# Patient Record
Sex: FEMALE | Race: WHITE | Employment: OTHER | URBAN - METROPOLITAN AREA
[De-identification: names, ages, dates, MRNs, and addresses within clinical notes are randomized per-mention and may not be internally consistent; named-entity substitution may affect disease eponyms.]

---

## 2017-09-12 ENCOUNTER — AMBULATORY - RIVER FALLS (OUTPATIENT)
Dept: FAMILY MEDICINE | Facility: CLINIC | Age: 62
End: 2017-09-12

## 2017-09-13 LAB
CHOLEST SERPL-MCNC: 156 MG/DL
CHOLEST/HDLC SERPL: 2.5 {RATIO}
CREAT SERPL-MCNC: 0.8 MG/DL (ref 0.5–0.99)
GLUCOSE BLD-MCNC: 112 MG/DL (ref 65–99)
HBA1C MFR BLD: 6.1 %
HDLC SERPL-MCNC: 62 MG/DL
LDLC SERPL CALC-MCNC: 79 MG/DL
NONHDLC SERPL-MCNC: 94 MG/DL
TRIGL SERPL-MCNC: 71 MG/DL

## 2017-09-18 ENCOUNTER — OFFICE VISIT - RIVER FALLS (OUTPATIENT)
Dept: FAMILY MEDICINE | Facility: CLINIC | Age: 62
End: 2017-09-18

## 2017-09-18 ASSESSMENT — MIFFLIN-ST. JEOR: SCORE: 1442.61

## 2018-04-24 ENCOUNTER — OFFICE VISIT - RIVER FALLS (OUTPATIENT)
Dept: FAMILY MEDICINE | Facility: CLINIC | Age: 63
End: 2018-04-24

## 2018-04-24 ASSESSMENT — MIFFLIN-ST. JEOR: SCORE: 1424.47

## 2018-04-25 LAB
CREAT SERPL-MCNC: 0.93 MG/DL (ref 0.5–0.99)
GLUCOSE BLD-MCNC: 100 MG/DL (ref 65–99)

## 2018-09-28 ENCOUNTER — OFFICE VISIT - RIVER FALLS (OUTPATIENT)
Dept: FAMILY MEDICINE | Facility: CLINIC | Age: 63
End: 2018-09-28

## 2018-09-28 ASSESSMENT — MIFFLIN-ST. JEOR: SCORE: 1406.32

## 2018-09-29 LAB
CHOLEST SERPL-MCNC: 161 MG/DL
CHOLEST/HDLC SERPL: 2.3 {RATIO}
CREAT SERPL-MCNC: 0.85 MG/DL (ref 0.5–0.99)
GLUCOSE BLD-MCNC: 114 MG/DL (ref 65–99)
HDLC SERPL-MCNC: 69 MG/DL
LDLC SERPL CALC-MCNC: 79 MG/DL
NONHDLC SERPL-MCNC: 92 MG/DL
TRIGL SERPL-MCNC: 57 MG/DL

## 2019-05-28 ENCOUNTER — AMBULATORY - RIVER FALLS (OUTPATIENT)
Dept: FAMILY MEDICINE | Facility: CLINIC | Age: 64
End: 2019-05-28

## 2019-05-29 ENCOUNTER — COMMUNICATION - RIVER FALLS (OUTPATIENT)
Dept: FAMILY MEDICINE | Facility: CLINIC | Age: 64
End: 2019-05-29

## 2019-05-29 LAB
CREAT UR-MCNC: 94 MG/DL (ref 20–275)
GLUCOSE BLD-MCNC: 96 MG/DL (ref 65–139)
HBA1C MFR BLD: 6.2 %
MICROALBUMIN UR-MCNC: 0.8 MG/DL
MICROALBUMIN/CREAT UR: 9 MG/G{CREAT}

## 2019-06-10 ENCOUNTER — OFFICE VISIT - RIVER FALLS (OUTPATIENT)
Dept: FAMILY MEDICINE | Facility: CLINIC | Age: 64
End: 2019-06-10

## 2019-06-10 ASSESSMENT — MIFFLIN-ST. JEOR: SCORE: 1397.25

## 2020-06-15 ENCOUNTER — COMMUNICATION - RIVER FALLS (OUTPATIENT)
Dept: FAMILY MEDICINE | Facility: CLINIC | Age: 65
End: 2020-06-15

## 2020-07-13 ENCOUNTER — OFFICE VISIT - RIVER FALLS (OUTPATIENT)
Dept: FAMILY MEDICINE | Facility: CLINIC | Age: 65
End: 2020-07-13

## 2020-07-14 ENCOUNTER — COMMUNICATION - RIVER FALLS (OUTPATIENT)
Dept: FAMILY MEDICINE | Facility: CLINIC | Age: 65
End: 2020-07-14

## 2020-07-14 LAB
BUN SERPL-MCNC: 27 MG/DL (ref 7–25)
BUN/CREAT RATIO - HISTORICAL: 32 (ref 6–22)
CALCIUM SERPL-MCNC: 9.6 MG/DL (ref 8.6–10.4)
CHLORIDE BLD-SCNC: 105 MMOL/L (ref 98–110)
CHOLEST SERPL-MCNC: 147 MG/DL
CHOLEST/HDLC SERPL: 2.5 {RATIO}
CO2 SERPL-SCNC: 30 MMOL/L (ref 20–32)
CREAT SERPL-MCNC: 0.84 MG/DL (ref 0.5–0.99)
EGFRCR SERPLBLD CKD-EPI 2021: 72 ML/MIN/1.73M2
GLUCOSE BLD-MCNC: 103 MG/DL (ref 65–139)
HBA1C MFR BLD: 6 %
HDLC SERPL-MCNC: 59 MG/DL
LDLC SERPL CALC-MCNC: 70 MG/DL
NONHDLC SERPL-MCNC: 88 MG/DL
POTASSIUM BLD-SCNC: 3.6 MMOL/L (ref 3.5–5.3)
SODIUM SERPL-SCNC: 140 MMOL/L (ref 135–146)
TRIGL SERPL-MCNC: 94 MG/DL

## 2020-07-17 ENCOUNTER — AMBULATORY - RIVER FALLS (OUTPATIENT)
Dept: FAMILY MEDICINE | Facility: CLINIC | Age: 65
End: 2020-07-17

## 2020-09-01 ENCOUNTER — COMMUNICATION - RIVER FALLS (OUTPATIENT)
Dept: FAMILY MEDICINE | Facility: CLINIC | Age: 65
End: 2020-09-01

## 2020-10-02 ENCOUNTER — OFFICE VISIT - RIVER FALLS (OUTPATIENT)
Dept: FAMILY MEDICINE | Facility: CLINIC | Age: 65
End: 2020-10-02

## 2020-10-02 ASSESSMENT — MIFFLIN-ST. JEOR: SCORE: 1360.96

## 2021-04-29 ENCOUNTER — OFFICE VISIT - RIVER FALLS (OUTPATIENT)
Dept: FAMILY MEDICINE | Facility: CLINIC | Age: 66
End: 2021-04-29

## 2021-06-17 ENCOUNTER — OFFICE VISIT - RIVER FALLS (OUTPATIENT)
Dept: FAMILY MEDICINE | Facility: CLINIC | Age: 66
End: 2021-06-17

## 2021-06-18 ENCOUNTER — RECORDS - HEALTHEAST (OUTPATIENT)
Dept: SCHEDULING | Facility: CLINIC | Age: 66
End: 2021-06-18

## 2021-06-18 ENCOUNTER — RECORDS - HEALTHEAST (OUTPATIENT)
Dept: OTOLARYNGOLOGY | Facility: TELEHEALTH | Age: 66
End: 2021-06-18

## 2021-06-18 ENCOUNTER — RECORDS - HEALTHEAST (OUTPATIENT)
Dept: ADMINISTRATIVE | Facility: OTHER | Age: 66
End: 2021-06-18

## 2021-06-18 ENCOUNTER — COMMUNICATION - RIVER FALLS (OUTPATIENT)
Dept: FAMILY MEDICINE | Facility: CLINIC | Age: 66
End: 2021-06-18

## 2021-06-18 DIAGNOSIS — K11.8 PAIN OF SUBMANDIBULAR GLAND: ICD-10-CM

## 2021-07-13 ENCOUNTER — OFFICE VISIT - RIVER FALLS (OUTPATIENT)
Dept: FAMILY MEDICINE | Facility: CLINIC | Age: 66
End: 2021-07-13

## 2021-07-13 ENCOUNTER — COMMUNICATION - RIVER FALLS (OUTPATIENT)
Dept: FAMILY MEDICINE | Facility: CLINIC | Age: 66
End: 2021-07-13

## 2021-07-14 LAB
BUN SERPL-MCNC: 23 MG/DL (ref 7–25)
BUN/CREAT RATIO - HISTORICAL: ABNORMAL (ref 6–22)
CALCIUM SERPL-MCNC: 9.9 MG/DL (ref 8.6–10.4)
CHLORIDE BLD-SCNC: 101 MMOL/L (ref 98–110)
CHOLEST SERPL-MCNC: 146 MG/DL
CHOLEST/HDLC SERPL: 2.3 {RATIO}
CO2 SERPL-SCNC: 30 MMOL/L (ref 20–32)
CREAT SERPL-MCNC: 0.88 MG/DL (ref 0.5–0.99)
EGFRCR SERPLBLD CKD-EPI 2021: 68 ML/MIN/1.73M2
GLUCOSE BLD-MCNC: 101 MG/DL (ref 65–99)
HBA1C MFR BLD: 6.1 %
HDLC SERPL-MCNC: 63 MG/DL
LDLC SERPL CALC-MCNC: 69 MG/DL
NONHDLC SERPL-MCNC: 83 MG/DL
POTASSIUM BLD-SCNC: 4 MMOL/L (ref 3.5–5.3)
SODIUM SERPL-SCNC: 140 MMOL/L (ref 135–146)
TRIGL SERPL-MCNC: 60 MG/DL

## 2021-07-15 ENCOUNTER — COMMUNICATION - RIVER FALLS (OUTPATIENT)
Dept: FAMILY MEDICINE | Facility: CLINIC | Age: 66
End: 2021-07-15

## 2021-12-28 ENCOUNTER — OFFICE VISIT - RIVER FALLS (OUTPATIENT)
Dept: FAMILY MEDICINE | Facility: CLINIC | Age: 66
End: 2021-12-28

## 2021-12-28 ASSESSMENT — MIFFLIN-ST. JEOR: SCORE: 1338.28

## 2022-01-28 ENCOUNTER — COMMUNICATION - RIVER FALLS (OUTPATIENT)
Dept: FAMILY MEDICINE | Facility: CLINIC | Age: 67
End: 2022-01-28
Payer: MEDICARE

## 2022-02-07 ENCOUNTER — OFFICE VISIT - RIVER FALLS (OUTPATIENT)
Dept: FAMILY MEDICINE | Facility: CLINIC | Age: 67
End: 2022-02-07
Payer: MEDICARE

## 2022-02-07 ENCOUNTER — TRANSFERRED RECORDS (OUTPATIENT)
Dept: MULTI SPECIALTY CLINIC | Facility: CLINIC | Age: 67
End: 2022-02-07

## 2022-02-10 ENCOUNTER — TRANSFERRED RECORDS (OUTPATIENT)
Dept: HEALTH INFORMATION MANAGEMENT | Facility: CLINIC | Age: 67
End: 2022-02-10

## 2022-02-10 LAB
C TRACH DNA SPEC QL PROBE+SIG AMP: NOT DETECTED
N GONORRHOEA DNA SPEC QL PROBE+SIG AMP: NOT DETECTED
SPECIMEN DESCRIP: NORMAL
SPECIMEN DESCRIPTION: NORMAL

## 2022-02-11 ENCOUNTER — COMMUNICATION - RIVER FALLS (OUTPATIENT)
Dept: FAMILY MEDICINE | Facility: CLINIC | Age: 67
End: 2022-02-11
Payer: MEDICARE

## 2022-02-12 VITALS
DIASTOLIC BLOOD PRESSURE: 70 MMHG | TEMPERATURE: 98.1 F | TEMPERATURE: 98.1 F | HEART RATE: 71 BPM | SYSTOLIC BLOOD PRESSURE: 123 MMHG | BODY MASS INDEX: 32.24 KG/M2 | WEIGHT: 182 LBS | DIASTOLIC BLOOD PRESSURE: 80 MMHG | HEART RATE: 80 BPM | SYSTOLIC BLOOD PRESSURE: 128 MMHG | WEIGHT: 187.6 LBS | BODY MASS INDEX: 33.23 KG/M2

## 2022-02-12 VITALS
WEIGHT: 185 LBS | HEART RATE: 80 BPM | SYSTOLIC BLOOD PRESSURE: 132 MMHG | HEIGHT: 63 IN | TEMPERATURE: 97.3 F | DIASTOLIC BLOOD PRESSURE: 74 MMHG | BODY MASS INDEX: 32.78 KG/M2

## 2022-02-12 VITALS
WEIGHT: 204 LBS | BODY MASS INDEX: 36.14 KG/M2 | HEART RATE: 87 BPM | DIASTOLIC BLOOD PRESSURE: 86 MMHG | SYSTOLIC BLOOD PRESSURE: 135 MMHG | HEIGHT: 63 IN

## 2022-02-12 VITALS
DIASTOLIC BLOOD PRESSURE: 72 MMHG | SYSTOLIC BLOOD PRESSURE: 118 MMHG | TEMPERATURE: 98.1 F | WEIGHT: 194 LBS | HEART RATE: 77 BPM | OXYGEN SATURATION: 94 % | BODY MASS INDEX: 34.37 KG/M2

## 2022-02-12 VITALS
HEIGHT: 63 IN | BODY MASS INDEX: 35.44 KG/M2 | DIASTOLIC BLOOD PRESSURE: 88 MMHG | WEIGHT: 200 LBS | SYSTOLIC BLOOD PRESSURE: 129 MMHG | HEART RATE: 76 BPM

## 2022-02-12 VITALS
BODY MASS INDEX: 35.08 KG/M2 | HEART RATE: 62 BPM | HEIGHT: 63 IN | SYSTOLIC BLOOD PRESSURE: 144 MMHG | WEIGHT: 198 LBS | TEMPERATURE: 97.7 F | DIASTOLIC BLOOD PRESSURE: 80 MMHG

## 2022-02-12 VITALS — SYSTOLIC BLOOD PRESSURE: 128 MMHG | DIASTOLIC BLOOD PRESSURE: 72 MMHG | HEART RATE: 78 BPM

## 2022-02-12 VITALS
SYSTOLIC BLOOD PRESSURE: 138 MMHG | DIASTOLIC BLOOD PRESSURE: 74 MMHG | TEMPERATURE: 97.2 F | WEIGHT: 192.8 LBS | BODY MASS INDEX: 34.15 KG/M2 | OXYGEN SATURATION: 96 % | HEART RATE: 78 BPM

## 2022-02-12 VITALS
TEMPERATURE: 97.3 F | SYSTOLIC BLOOD PRESSURE: 126 MMHG | DIASTOLIC BLOOD PRESSURE: 72 MMHG | WEIGHT: 190 LBS | OXYGEN SATURATION: 92 % | BODY MASS INDEX: 33.66 KG/M2 | HEART RATE: 71 BPM | HEIGHT: 63 IN

## 2022-02-12 VITALS
SYSTOLIC BLOOD PRESSURE: 136 MMHG | HEIGHT: 63 IN | DIASTOLIC BLOOD PRESSURE: 84 MMHG | BODY MASS INDEX: 36.86 KG/M2 | DIASTOLIC BLOOD PRESSURE: 76 MMHG | WEIGHT: 208 LBS | HEART RATE: 77 BPM | SYSTOLIC BLOOD PRESSURE: 132 MMHG

## 2022-02-15 NOTE — LETTER
(Inserted Image. Unable to display)   April 02, 2019      TU [address] [place], [place] [id number]        Dear TU,      Thank you for selecting [facility] (previously [place] [facility]) for your healthcare needs.      Our records indicate you are due for the following services:    Annual Well Adult Exam.  Hypertension check ~ please remember to bring your at-home blood pressure readings with you to your appointment.   Urine labs ~ Please be prepared to leave a urine specimen for evaluation.  Non-Fasting Labs.    If you had your labs done at another facility or with Direct Access Lab Testing at [facility], please bring in a copy of the results to your next visit, mail a copy, or drop off a copy of your results to your Healthcare Provider.    You are due for lab work and an office visit; please schedule the lab appointment 1 week before the office visit.  This will assure all results are available to discuss with your provider during your visit.    **It is very helpful if you bring your medication bottles to your appointment.  This assures we have all of your current medications, including strength and dosing information, documented accurately in your medical record.    To schedule an appointment or if you have further questions, please contact your primary clinic:   [facility]       [phone]   [facility]       [phone]              [facility]     [phone]      Powered by [employer]    Sincerely,    Mark Barrera MD, FACP

## 2022-02-15 NOTE — NURSING NOTE
Quick Intake Entered On:  6/10/2019 11:00 AM CDT    Performed On:  6/10/2019 11:00 AM CDT by Mark Barrera MD               Summary   Systolic Blood Pressure :   144 mmHg (HI)    Diastolic Blood Pressure :   80 mmHg   Mean Arterial Pressure :   101 mmHg   BP Site :   Right arm   BP Method :   Manual   Mark Barrera MD - 6/10/2019 11:00 AM CDT

## 2022-02-15 NOTE — LETTER
(Inserted Image. Unable to display)   April 02, 2019      TU [address] [place], [place] [id number]        Dear TU,      Thank you for selecting [facility] (previously [place] [facility]) for your healthcare needs.    Our records indicate you are due for the following services:     Screening Mammogram ~ Your provider has recommended you have the preventative service for a screening mammogram.  Please schedule at your earliest convenience.  [facility] are dedicated to providing excellent care that is most cost effective for our patients.      *Please contact your insurance company regarding approved providers*      [facility]: [phone]    [place]:        [phone]    [facility]    [phone]   [facility]    [facility]    [phone]  [facility])  [phone]  [facility]  [phone]  [facility]      [phone]  [facility])     [phone]     Powered by [employer]    Sincerely,    Mark Barrera MD, FACPPer 9/22/16 visit pt due for annual mammogram.

## 2022-02-15 NOTE — TELEPHONE ENCOUNTER
Entered by Lillie Valle CMA on June 18, 2021 1:32:56 PM CDT  ---------------------  From: Lillie Valle CMA   To: [facility]    Sent: 6/18/2021 1:32:50 PM CDT  Subject: Medication Management     ** Not Approved: Patient has requested refill too soon, 2, 11 sent 7/13/20 **  albuterol (ALBUTEROL SULFATE HFA HFA AEROSOL SOLN)  INHALE TWO PUFFS BY MOUTH EVERY FOUR HOURS AS NEEDED FOR WHEEZING  Qty:  13.4 gm        Days Supply:  34        Refills:  11          Substitutions Allowed     Route To Pharmacy - [facility]   Signed by Lillie Valle CMA            ** Patient matched by Lillie Valle CMA on 6/18/2021 1:31:41 PM CDT **      ------------------------------------------  From: [facility]  To: Mark Barrera MD  Sent: June 18, 2021 1:02:47 PM CDT  Subject: Medication Management  Due: June 4, 2021 8:25:53 PM CDT     ** On Hold Pending Signature **     Drug: albuterol (ProAir HFA 90 mcg/inh inhalation aerosol), INHALE TWO PUFFS BY MOUTH EVERY FOUR HOURS AS NEEDED FOR WHEEZING  Quantity: 13.4 gm  Days Supply: 34  Refills: 11  Substitutions Allowed  Notes from Pharmacy:     Dispensed Drug: albuterol (Albuterol (Eqv-Proventil HFA) 90 mcg/inh inhalation aerosol), INHALE TWO PUFFS BY MOUTH EVERY FOUR HOURS AS NEEDED FOR WHEEZING  Quantity: 13.4 gm  Days Supply: 34  Refills: 11  Substitutions Allowed  Notes from Pharmacy:  ------------------------------------------

## 2022-02-15 NOTE — TELEPHONE ENCOUNTER
Entered by Adriane Jeong CMA on July 20, 2020 12:49:40 PM CDT  ---------------------  From: Adriane Jeong CMA   To: [facility]    Sent: 7/20/2020 12:49:40 PM CDT  Subject: Medication Management     ** Not Approved: Filled 7/13/20 for 30 w/ 0 refills. Pt due visit in 1 month **  simvastatin (SIMVASTATIN 40MG TABLET)  TAKE 1 TABLET BY MOUTH AT BEDTIME  Qty:  30 unknown unit        Days Supply:  0        Refills:  0          Substitutions Allowed     Route To Pharmacy - [facility]   Signed by Adriane Jeong CMA            ** Patient matched by Adriane Jeong CMA on 7/20/2020 12:47:59 PM CDT **      ------------------------------------------  From: [facility] [employer]  To: Mark Barrera MD  Sent: July 17, 2020 12:28:00 PM CDT  Subject: Medication Management  Due: June 24, 2020 10:20:04 PM CDT     ** On Hold Pending Signature **     Dispensed Drug: simvastatin (simvastatin 40 mg oral tablet), TAKE 1 TABLET BY MOUTH AT BEDTIME  Quantity: 30 unknown unit  Days Supply: 0  Refills: 0  Substitutions Allowed  Notes from Pharmacy:  ------------------------------------------

## 2022-02-15 NOTE — NURSING NOTE
Vital Signs Entered On:  2/1/2021 12:40 PM CST    Performed On:  2/1/2021 12:39 PM CST by Sophie Gonzalez RN               Vital Signs   Systolic Blood Pressure :   128 mmHg   Diastolic Blood Pressure :   72 mmHg   Mean Arterial Pressure :   91 mmHg   BP Site :   Right arm   Peripheral Pulse Rate :   78 bpm   Pulse Site :   Radial artery   Sophie Gonzalez RN - 2/1/2021 12:39 PM CST

## 2022-02-15 NOTE — NURSING NOTE
Comprehensive Intake Entered On:  7/13/2020 2:32 PM CDT    Performed On:  7/13/2020 2:26 PM CDT by Loni Lam               Summary   Chief Complaint :   Medication Refill, c/o rash under breasts- would like hydrocortisone cream refilled.    Weight Measured :   194 lb(Converted to: 194 lb 0 oz, 88.00 kg)    Systolic Blood Pressure :   118 mmHg   Diastolic Blood Pressure :   72 mmHg   Mean Arterial Pressure :   87 mmHg   Peripheral Pulse Rate :   77 bpm   BP Site :   Right arm   BP Method :   Manual   HR Method :   Electronic   Temperature Tympanic :   98.1 DegF(Converted to: 36.7 DegC)    Oxygen Saturation :   94 %   Loni Lam - 7/13/2020 2:26 PM CDT   Health Status   Allergies Verified? :   Yes   Medication History Verified? :   Yes   Medical History Verified? :   Yes   Pre-Visit Planning Status :   Completed   Tobacco Use? :   Former smoker   Loni Lam - 7/13/2020 2:26 PM CDT   Consents   Consent for Immunization Exchange :   Consent Denied   Consent for Immunizations to Providers :   Consent Granted   Loni Lam - 7/13/2020 2:26 PM CDT   Meds / Allergies   (As Of: 7/13/2020 2:32:34 PM CDT)   Allergies (Active)   No Known Medication Allergies  Estimated Onset Date:   Unspecified ; Created By:   Etelvina Galan MA; Reaction Status:   Active ; Category:   Drug ; Substance:   No Known Medication Allergies ; Type:   Allergy ; Updated By:   Etelvina Galan MA; Reviewed Date:   7/13/2020 2:28 PM CDT        Medication List   (As Of: 7/13/2020 2:32:34 PM CDT)   Prescription/Discharge Order    fluticasone nasal  :   fluticasone nasal ; Status:   Prescribed ; Ordered As Mnemonic:   fluticasone 50 mcg/inh nasal spray ; Simple Display Line:   See Instructions, INHALE 2 SPRAYS IN EACH NOSTRIL ONCE DAILY, 16 unknown unit, 0 Refill(s) ; Ordering Provider:   Mark Barrera MD; Catalog Code:   fluticasone nasal ; Order Dt/Tm:   6/19/2020 3:58:25 PM CDT          hydrochlorothiazide-triamterene  :    hydrochlorothiazide-triamterene ; Status:   Prescribed ; Ordered As Mnemonic:   hydrochlorothiazide-triamterene 25 mg-37.5 mg oral tablet ; Simple Display Line:   1 tab(s), Oral, daily, 30 tab(s), 0 Refill(s) ; Ordering Provider:   Mark Barrera MD; Catalog Code:   hydrochlorothiazide-triamterene ; Order Dt/Tm:   6/19/2020 3:57:53 PM CDT          FLUoxetine  :   FLUoxetine ; Status:   Prescribed ; Ordered As Mnemonic:   FLUoxetine 10 mg oral capsule ; Simple Display Line:   See Instructions, TAKE 1 CAPSULE BY MOUTH TWICE DAILY, 60 unknown unit, 0 Refill(s) ; Ordering Provider:   Mark Barrera MD; Catalog Code:   FLUoxetine ; Order Dt/Tm:   6/15/2020 12:42:39 PM CDT          simvastatin  :   simvastatin ; Status:   Prescribed ; Ordered As Mnemonic:   simvastatin 40 mg oral tablet ; Simple Display Line:   See Instructions, TAKE ONE TABLET BY MOUTH AT BEDTIME, 30 unknown unit, 0 Refill(s) ; Ordering Provider:   Mark Barrera MD; Catalog Code:   simvastatin ; Order Dt/Tm:   6/15/2020 12:42:38 PM CDT          albuterol  :   albuterol ; Status:   Prescribed ; Ordered As Mnemonic:   Ventolin HFA 90 mcg/inh inhalation aerosol ; Simple Display Line:   2 puff(s), inh, q4 hrs, PRN: as needed for wheezing, 2 EA, 11 Refill(s) ; Ordering Provider:   Mark Barrera MD; Catalog Code:   albuterol ; Order Dt/Tm:   6/10/2019 11:01:40 AM CDT            Home Meds    hydrocortisone topical  :   hydrocortisone topical ; Status:   Documented ; Ordered As Mnemonic:   hydrocortisone 2.5% topical cream ; Simple Display Line:   Topical, tid, 0 Refill(s) ; Catalog Code:   hydrocortisone topical ; Order Dt/Tm:   7/13/2020 2:30:54 PM CDT            ID Risk Screen   Recent Travel History :   No recent travel   Family Member Travel History :   No recent travel   Other Exposure to Infectious Disease :   Unknown   Alexandra/Loni CUENCA - 7/13/2020 2:26 PM CDT

## 2022-02-15 NOTE — TELEPHONE ENCOUNTER
---------------------  From: Cecy Larry CMA (Phone Messages Pool ([id number]_[facility]))   To: [facility] Message Pool ([id number]_[place]);     Sent: 9/1/2020 9:14:49 AM CDT  Subject: General Message-simvastatin refill     Phone Message    PCP:   JO      Time of Call:  0840       Person Calling:  self  Phone number:  [phone]    Returned call at: 0910    Note:   pt seen 7/13 for rash and med refills.  Jerardos stating she doesn't have any refills on her simvastatin, all others refills.  She's also being told she is due for AWV.  She had lipids done 7/13.  We talked about scheduling AWV and she didn't think that was necessary at this time.  Please advise on refill of simvastatin or if visit needed.      also asking about flonase, per Samira they don't have any on file, resending script x 1yr per 7/13 refills    Last office visit and reason:  7/13---------------------  From: Alexandra/Loni CUENCA ([facility] Message Pool ([id number]))   To: Mark Barrera MD;     Sent: 9/1/2020 9:24:23 AM CDT  Subject: FW: General Message-simvastatin refill---------------------  From: Mark Barrera MD   To: [facility] Message Pool ([id number]);     Sent: 9/1/2020 10:18:01 AM CDT  Subject: RE: General Message-simvastatin refill     Refill Simvastatin for one year.Filled per J. Patient notified.

## 2022-02-15 NOTE — NURSING NOTE
Comprehensive Intake Entered On:  6/17/2021 12:52 PM CDT    Performed On:  6/17/2021 12:46 PM CDT by Loni Lam               Summary   Chief Complaint :   c/ of jaw pain x 2 weeks    Advance Directive :   No   Weight Measured :   187.6 lb(Converted to: 187 lb 10 oz, 85.094 kg)    Systolic Blood Pressure :   145 mmHg (HI)    Diastolic Blood Pressure :   81 mmHg (HI)    Mean Arterial Pressure :   102 mmHg   Peripheral Pulse Rate :   80 bpm   BP Site :   Right arm   BP Method :   Electronic   HR Method :   Electronic   Temperature Tympanic :   98.1 DegF(Converted to: 36.7 DegC)    Loni Lam - 6/17/2021 12:46 PM CDT   Health Status   Allergies Verified? :   Yes   Medication History Verified? :   No   Medical History Verified? :   Yes   Pre-Visit Planning Status :   Completed   Tobacco Use? :   Former smoker   Loni Lam - 6/17/2021 12:46 PM CDT   Consents   Consent for Immunization Exchange :   Consent Granted   Consent for Immunizations to Providers :   Consent Granted   Loni Lam - 6/17/2021 12:46 PM CDT   Meds / Allergies   (As Of: 6/17/2021 12:52:36 PM CDT)   Allergies (Active)   No Known Medication Allergies  Estimated Onset Date:   Unspecified ; Created By:   Etelvina Galan MA; Reaction Status:   Active ; Category:   Drug ; Substance:   No Known Medication Allergies ; Type:   Allergy ; Updated By:   Etelvina Galan MA; Reviewed Date:   6/17/2021 12:50 PM CDT        Medication List   (As Of: 6/17/2021 12:52:36 PM CDT)   Prescription/Discharge Order    albuterol  :   albuterol ; Status:   Prescribed ; Ordered As Mnemonic:   Ventolin HFA 90 mcg/inh inhalation aerosol ; Simple Display Line:   2 puff(s), inh, q4 hrs, PRN: as needed for wheezing, 2 EA, 11 Refill(s) ; Ordering Provider:   Mark Barrera MD; Catalog Code:   albuterol ; Order Dt/Tm:   7/13/2020 2:56:24 PM CDT          FLUoxetine  :   FLUoxetine ; Status:   Prescribed ; Ordered As Mnemonic:   FLUoxetine 10 mg oral capsule ;  Simple Display Line:   20 mg, 2 cap(s), Oral, daily, for 90 day(s), 180 cap(s), 3 Refill(s) ; Ordering Provider:   Mark Barrera MD; Catalog Code:   FLUoxetine ; Order Dt/Tm:   7/13/2020 2:55:42 PM CDT          fluticasone nasal  :   fluticasone nasal ; Status:   Prescribed ; Ordered As Mnemonic:   fluticasone 50 mcg/inh nasal spray ; Simple Display Line:   See Instructions, INHALE 2 SPRAYS IN EACH NOSTRIL ONCE DAILY, 16 unknown unit, 11 Refill(s) ; Ordering Provider:   Mark Barrera MD; Catalog Code:   fluticasone nasal ; Order Dt/Tm:   9/1/2020 11:54:38 AM CDT          hydrochlorothiazide-triamterene  :   hydrochlorothiazide-triamterene ; Status:   Prescribed ; Ordered As Mnemonic:   hydrochlorothiazide-triamterene 25 mg-37.5 mg oral tablet ; Simple Display Line:   See Instructions, 0.5 tab(s) Oral daily, 45 EA, 3 Refill(s) ; Ordering Provider:   Mark Barrera MD; Catalog Code:   hydrochlorothiazide-triamterene ; Order Dt/Tm:   7/13/2020 2:53:49 PM CDT          simvastatin  :   simvastatin ; Status:   Prescribed ; Ordered As Mnemonic:   simvastatin 40 mg oral tablet ; Simple Display Line:   40 mg, 1 tab(s), Oral, hs, 90 tab(s), 3 Refill(s) ; Ordering Provider:   Mark Barrera MD; Catalog Code:   simvastatin ; Order Dt/Tm:   9/1/2020 10:25:45 AM CDT            Home Meds    ascorbic acid  :   ascorbic acid ; Status:   Documented ; Ordered As Mnemonic:   Vitamin C 500 mg oral tablet ; Simple Display Line:   500 mg, 1 tab(s), Oral, daily, 0 Refill(s) ; Catalog Code:   ascorbic acid ; Order Dt/Tm:   4/29/2021 12:09:31 PM CDT          chondroitin-glucosamine  :   chondroitin-glucosamine ; Status:   Documented ; Ordered As Mnemonic:   [employer] ; Simple Display Line:   1 tab, Oral, daily, 0 Refill(s) ; Catalog Code:   chondroitin-glucosamine ; Order Dt/Tm:   4/29/2021 12:11:15 PM CDT          multivitamin with minerals  :   multivitamin with minerals ; Status:   Documented ; Ordered As  Mnemonic:   Vitamin D with Minerals oral tablet ; Simple Display Line:   1 tab(s), Oral, daily, 0 Refill(s) ; Catalog Code:   multivitamin with minerals ; Order Dt/Tm:   4/29/2021 12:09:44 PM CDT          omega-3 polyunsaturated fatty acids  :   omega-3 polyunsaturated fatty acids ; Status:   Documented ; Ordered As Mnemonic:   Fish Oil ; Simple Display Line:   1 tab, Oral, daily, 0 Refill(s) ; Catalog Code:   omega-3 polyunsaturated fatty acids ; Order Dt/Tm:   4/29/2021 12:09:11 PM CDT          pyridoxine  :   pyridoxine ; Status:   Documented ; Ordered As Mnemonic:   Vitamin B6 ; Simple Display Line:   1 tab, Oral, daily, 0 Refill(s) ; Catalog Code:   pyridoxine ; Order Dt/Tm:   4/29/2021 12:10:21 PM CDT          turmeric  :   turmeric ; Status:   Documented ; Ordered As Mnemonic:   turmeric 500 mg oral capsule ; Simple Display Line:   500 mg, 1 cap(s), Oral, daily, 0 Refill(s) ; Catalog Code:   turmeric ; Order Dt/Tm:   4/29/2021 12:09:23 PM CDT          vitamin E  :   vitamin E ; Status:   Documented ; Ordered As Mnemonic:   vitamin E ; Simple Display Line:   1 tab, Oral, daily, 0 Refill(s) ; Catalog Code:   vitamin E ; Order Dt/Tm:   4/29/2021 12:10:05 PM CDT            ID Risk Screen   Recent Travel History :   No recent travel   Family Member Travel History :   No recent travel   Other Exposure to Infectious Disease :   Unknown   COVID-19 Testing Status :   No COVID-19 test performed   Loni Lam 6/17/2021 12:46 PM CDT

## 2022-02-15 NOTE — LETTER
(Inserted Image. Unable to display)   July 14, 2020      TU [address]AO      [address] [place], [place] [id number]        Dear TU,    Thank you for selecting [facility] (previously [facility]) for your healthcare needs.  Below you will find the results of your recent tests done at our clinic.     Results are acceptable.      Result Name Current Result Previous Result Reference Range   Sodium Level (mmol/L)  140 7/13/2020  140 9/28/2018 135 - 146   Potassium Level (mmol/L)  3.6 7/13/2020  4.4 9/28/2018 3.5 - 5.3   Chloride Level (mmol/L)  105 7/13/2020  101 9/28/2018 98 - 110   CO2 Level (mmol/L)  30 7/13/2020 ((H)) 34 9/28/2018 20 - 32   Glucose Level (mg/dL)  103 7/13/2020  96 5/28/2019 65 - 139   BUN (mg/dL) ((H)) 27 7/13/2020  20 9/28/2018 7 - 25   Creatinine Level (mg/dL)  0.84 7/13/2020  0.85 9/28/2018 0.50 - 0.99   eGFR (mL/min/1.73m2)  72 7/13/2020  72 9/28/2018 > OR = 60 -    Calcium Level (mg/dL)  9.6 7/13/2020  9.5 9/28/2018 8.6 - 10.4   Hgb A1c ((H)) 6.0 7/13/2020 ((H)) 6.2 5/28/2019  - <5.7   Cholesterol (mg/dL)  147 7/13/2020  161 9/28/2018  - <200   Non-HDL Cholesterol  88 7/13/2020  92 9/28/2018  - <130   HDL (mg/dL)  59 7/13/2020  69 9/28/2018 > OR = 50 -    Cholesterol/HDL Ratio  2.5 7/13/2020  2.3 9/28/2018  - <5.0   LDL  70 7/13/2020  79 9/28/2018    Triglyceride (mg/dL)  94 7/13/2020  57 9/28/2018  - <150       Please contact me or my assistant at [phone] if you have any questions.     Sincerely,        Mark Barrera MD

## 2022-02-15 NOTE — PROGRESS NOTES
Chief Complaint    follow up gallbladder surgery  History of Present Illness      Pamela was hospitalized [place] from April 14-16.  She presented with acute gallstone pancreatitis and underwent cholecystectomy laparoscopically on April 15 and subsequent ERCP.  She was told that she also had renal stones.  Her depression is improved.  Has not had any breathing difficulties over the winter.  She has had no abdominal pain diarrhea nausea or vomiting since discharge.  No flank pain or hematuria.  Review of Systems      No weight loss.  Date improving.  No chest pain.  No edema.  Physical Exam   Vitals & Measurements    HR: 87(Peripheral)  BP: 135/86     HT: 63 in  WT: 204 lb  BMI: 36.13       Patient is an obese woman in no distress.  Alert and oriented.  Sclera anicteric.  Clear.  Cardiac exam regular no edema.  Abdomen is obese and nontender.  Port sites in good condition.  Assessment/Plan       Chronic Obstructive Pulmonary Disease (COPD) (J44.9)         Stable we will repeat PFTs at follow-up appointment in 3 weeks.         Orders:          [id number] transitional care manage service 7 day discharge (Charge), Quantity: 1, Gall stone pancreatitis  Nephrolithiasis  Dyslipidemia, goal LDL below 130  Chronic Obstructive Pulmonary Disease (COPD)  HTN, goal below 140/90  Mild Major Depression                Dyslipidemia, goal LDL below 130 (E78.5)         On statin.         Orders:          [id number] transitional care manage service 7 day discharge (Charge), Quantity: 1, Gall stone pancreatitis  Nephrolithiasis  Dyslipidemia, goal LDL below 130  Chronic Obstructive Pulmonary Disease (COPD)  HTN, goal below 140/90  Mild Major Depression                Gall stone pancreatitis (K85.10)         Status post ERCP and laparoscopic cholecystectomy.         Orders:          [id number] transitional care manage service 7 day discharge (Charge), Quantity: 1, Gall stone pancreatitis  Nephrolithiasis  Dyslipidemia, goal LDL below 130   Chronic Obstructive Pulmonary Disease (COPD)  HTN, goal below 140/90  Mild Major Depression          Comprehensive Metabolic Panel* ([facility]), Specimen Type: Serum, Collection Date: 04/24/18 13:55:00 CDT                HTN, goal below 140/90 (I10)         Controlled.         Orders:          [id number] transitional care manage service 7 day discharge (Charge), Quantity: 1, Gall stone pancreatitis  Nephrolithiasis  Dyslipidemia, goal LDL below 130  Chronic Obstructive Pulmonary Disease (COPD)  HTN, goal below 140/90  Mild Major Depression          Comprehensive Metabolic Panel* ([facility]), Specimen Type: Serum, Collection Date: 04/24/18 13:55:00 CDT                Mild Major Depression (F32.0)         Improved.         Orders:          [id number] transitional care manage service 7 day discharge (Charge), Quantity: 1, Gall stone pancreatitis  Nephrolithiasis  Dyslipidemia, goal LDL below 130  Chronic Obstructive Pulmonary Disease (COPD)  HTN, goal below 140/90  Mild Major Depression                Nephrolithiasis (N20.0)           Urology referral.         Orders:          [id number] transitional care manage service 7 day discharge (Charge), Quantity: 1, Gall stone pancreatitis  Nephrolithiasis  Dyslipidemia, goal LDL below 130  Chronic Obstructive Pulmonary Disease (COPD)  HTN, goal below 140/90  Mild Major Depression          Urology Consult (Request), Referred to: Lauren or Laquita, Reason: Incidental nephrolithiasis, Nephrolithiasis                Orders:         Return to Clinic (Request), RFV: COPD, Return in 6-8 weeks, Instructions: With PFT  Patient Information     Name:TU CHAO      Address:      [address] [place] [id number]     Sex:Female     YOB: 1955     [phone]     Emergency Contact:SKIP CASTANEDA     MRN:[id number]     FIN:[id number]     [facility]     Date of Service:04/24/2018      Primary Care Physician:       Mark Barrera MD  [phone]      Attending Physician:       Mark Barrera MD, [phone]  Problem List/Past Medical History    Ongoing     Backpain     Chronic Obstructive Pulmonary Disease (COPD)       Comments: Moderate severe COPD with FEV1 56% predicted     Dyslipidemia, goal LDL below 130       Comments: ATP3 10-year risk 6%     Eczema     Glucose intolerance (impaired glucose tolerance)     HEADACHE     HTN, goal below 140/90     Mild Major Depression     Myofacial muscle pain     Nephrolithiasis     Obesity     Rhinitis, chronic     Seborrheic Dermatitis     Tobacco user    Historical     Gall stone pancreatitis  Procedure/Surgical History     ERCP (04/16/2018)     Laparoscopic cholecystectomy (04/15/2018)     Appendectomy (04/04/1984)     Left Kidney Surgery (1972)  Medications        Maxzide-25 oral tablet: See Instructions, 0.5 tab(s) po daily, 30 EA, 11 Refill(s).        simvastatin 40 mg oral tablet: 40 mg, 1 tab(s), PO, Once a day (at bedtime), 30 tab(s), 11 Refill(s).        Flonase 50 mcg/inh nasal spray: 2 spray(s), nasal, daily, 1 EA, 11 Refill(s).        Ventolin HFA 90 mcg/inh inhalation aerosol: 2 puff(s), inh, q4 hrs, PRN: as needed for wheezing, 1 EA, 11 Refill(s).        FLUoxetine 20 mg oral capsule: 20 mg, 1 cap(s), PO, Daily, 30 cap(s), 11 Refill(s).        Anoro Ellipta 62.5 mcg-25 mcg/inh inhalation powder: 1 puff(s), inh, daily, 30 EA, 11 Refill(s).                Allergies    No Known Medication Allergies  Social History    Smoking Status - 04/24/2018     Former smoker     Alcohol      Past, 04/20/2012     Employment and Education      Unemployed, 04/20/2012     Exercise and Physical Activity      Exercise frequency: Never., 04/20/2012     Home and Environment      Marital status: ., 04/20/2012     Tobacco - Past, 12/11/2015      Past, 03/07/2014  Family History    Arthritis: Mother.    COPD: Mother and Father.    Hypertension: Mother, Father, Sister and Brother.    Lung cancer..:  Sister.    Obesity: Mother.    Sleep apnea syndrome: Mother and Father.    Uterine cancer: Mother.  Immunizations      Vaccine Date Status Comments      ZOS, shingles 04/13/2018 Recorded [4/13/2018] [facility] Recombinant Zoster (shingles) Vaccine      influenza virus vaccine, inactivated 09/18/2017 Given      ZOS, shingles 09/18/2017 Recorded      influenza virus vaccine, inactivated 09/22/2016 Given      pneumococcal (PPSV23) 02/11/2016 Given      influenza virus vaccine, inactivated 09/28/2015 Given      pneumococcal (PCV13) 12/09/2014 Given      influenza virus vaccine, inactivated 10/02/2014 Given      influenza virus vaccine, inactivated 12/17/2013 Given      influenza virus vaccine, inactivated 09/17/2012 Given      DTaP 05/29/2008 Recorded

## 2022-02-15 NOTE — LETTER
(Inserted Image. Unable to display)       [address] [place] [address]  July 15, 2021      TU CHAO      [address] [place] [address]        Dear TU,    Thank you for selecting [facility] for your healthcare needs.  Below you will find the results of your recent tests done at our clinic.     Results are acceptable.      Result Name Current Result Previous Result Reference Range   Sodium Level (mmol/L)  140 7/13/2021  140 7/13/2020 135 - 146   Potassium Level (mmol/L)  4.0 7/13/2021  3.6 7/13/2020 3.5 - 5.3   Chloride Level (mmol/L)  101 7/13/2021  105 7/13/2020 98 - 110   CO2 Level (mmol/L)  30 7/13/2021  30 7/13/2020 20 - 32   Glucose Level (mg/dL) ((H)) 101 7/13/2021  103 7/13/2020 65 - 99   BUN (mg/dL)  23 7/13/2021 ((H)) 27 7/13/2020 7 - 25   Creatinine Level (mg/dL)  0.88 7/13/2021  0.84 7/13/2020 0.50 - 0.99   eGFR (mL/min/1.73m2)  68 7/13/2021  72 7/13/2020 > OR = 60 -    Calcium Level (mg/dL)  9.9 7/13/2021  9.6 7/13/2020 8.6 - 10.4   Hgb A1c ((H)) 6.1 7/13/2021 ((H)) 6.0 7/13/2020  - <5.7   Cholesterol (mg/dL)  146 7/13/2021  147 7/13/2020  - <200   Non-HDL Cholesterol  83 7/13/2021  88 7/13/2020  - <130   HDL (mg/dL)  63 7/13/2021  59 7/13/2020 > OR = 50 -    Cholesterol/HDL Ratio  2.3 7/13/2021  2.5 7/13/2020  - <5.0   LDL  69 7/13/2021  70 7/13/2020    Triglyceride (mg/dL)  60 7/13/2021  94 7/13/2020  - <150       Please contact me or my assistant at [phone] if you have any questions.     Sincerely,        Mark Barrera MD

## 2022-02-15 NOTE — NURSING NOTE
Comprehensive Intake Entered On:  4/29/2021 12:14 PM CDT    Performed On:  4/29/2021 12:02 PM CDT by Charlee Abarca CMA               Summary   Chief Complaint :   Patient presents for back spasms above her sacrum ongoing for a month. No known injury. Does see chiropractic but back spasms were improving now are getting worse cannot twist or turn very much.   Advance Directive :   No   Weight Measured :   192.8 lb(Converted to: 192 lb 13 oz, 87.453 kg)    Systolic Blood Pressure :   138 mmHg (HI)    Diastolic Blood Pressure :   74 mmHg   Mean Arterial Pressure :   95 mmHg   Peripheral Pulse Rate :   78 bpm   BP Site :   Right arm   BP Method :   Manual   HR Method :   Electronic   Temperature Tympanic :   97.2 DegF(Converted to: 36.2 DegC)  (LOW)    Oxygen Saturation :   96 %   Pain Present :   Yes actual or suspected pain   Intensity :   9    Primary Pain Comments :   when spasming 9. When at rest 3.   Primary Pain Location :   Back   Charlee Abarca CMA - 4/29/2021 12:02 PM CDT   Health Status   Allergies Verified? :   Yes   Medication History Verified? :   Yes   Tobacco Use? :   Former smoker   Charlee Abarca CMA - 4/29/2021 12:02 PM CDT   Consents   Consent for Immunization Exchange :   Consent Granted   Consent for Immunizations to Providers :   Consent Granted   Charlee Abarca CMA - 4/29/2021 12:02 PM CDT   Meds / Allergies   (As Of: 4/29/2021 12:14:15 PM CDT)   Allergies (Active)   No Known Medication Allergies  Estimated Onset Date:   Unspecified ; Created By:   Etelvina Galan MA; Reaction Status:   Active ; Category:   Drug ; Substance:   No Known Medication Allergies ; Type:   Allergy ; Updated By:   Etelvina Galan MA; Reviewed Date:   4/29/2021 12:08 PM CDT        Medication List   (As Of: 4/29/2021 12:14:15 PM CDT)   Prescription/Discharge Order    albuterol  :   albuterol ; Status:   Prescribed ; Ordered As Mnemonic:   Ventolin HFA 90 mcg/inh inhalation aerosol ; Simple Display Line:   2 puff(s), inh,  q4 hrs, PRN: as needed for wheezing, 2 EA, 11 Refill(s) ; Ordering Provider:   Mark Barrera MD; Catalog Code:   albuterol ; Order Dt/Tm:   7/13/2020 2:56:24 PM CDT          FLUoxetine  :   FLUoxetine ; Status:   Prescribed ; Ordered As Mnemonic:   FLUoxetine 10 mg oral capsule ; Simple Display Line:   20 mg, 2 cap(s), Oral, daily, for 90 day(s), 180 cap(s), 3 Refill(s) ; Ordering Provider:   Mark Barrera MD; Catalog Code:   FLUoxetine ; Order Dt/Tm:   7/13/2020 2:55:42 PM CDT          fluticasone nasal  :   fluticasone nasal ; Status:   Prescribed ; Ordered As Mnemonic:   fluticasone 50 mcg/inh nasal spray ; Simple Display Line:   See Instructions, INHALE 2 SPRAYS IN EACH NOSTRIL ONCE DAILY, 16 unknown unit, 11 Refill(s) ; Ordering Provider:   Mark Barrera MD; Catalog Code:   fluticasone nasal ; Order Dt/Tm:   9/1/2020 11:54:38 AM CDT          hydrochlorothiazide-triamterene  :   hydrochlorothiazide-triamterene ; Status:   Prescribed ; Ordered As Mnemonic:   hydrochlorothiazide-triamterene 25 mg-37.5 mg oral tablet ; Simple Display Line:   See Instructions, 0.5 tab(s) Oral daily, 45 EA, 3 Refill(s) ; Ordering Provider:   Mark Barrera MD; Catalog Code:   hydrochlorothiazide-triamterene ; Order Dt/Tm:   7/13/2020 2:53:49 PM CDT          simvastatin  :   simvastatin ; Status:   Prescribed ; Ordered As Mnemonic:   simvastatin 40 mg oral tablet ; Simple Display Line:   40 mg, 1 tab(s), Oral, hs, 90 tab(s), 3 Refill(s) ; Ordering Provider:   Mark Barrera MD; Catalog Code:   simvastatin ; Order Dt/Tm:   9/1/2020 10:25:45 AM CDT            Home Meds    ascorbic acid  :   ascorbic acid ; Status:   Documented ; Ordered As Mnemonic:   Vitamin C 500 mg oral tablet ; Simple Display Line:   500 mg, 1 tab(s), Oral, daily, 0 Refill(s) ; Catalog Code:   ascorbic acid ; Order Dt/Tm:   4/29/2021 12:09:31 PM CDT          chondroitin-glucosamine  :   chondroitin-glucosamine ; Status:   Documented ; Ordered As Mnemonic:    Osteo [employer] ; Simple Display Line:   1 tab, Oral, daily, 0 Refill(s) ; Catalog Code:   chondroitin-glucosamine ; Order Dt/Tm:   4/29/2021 12:11:15 PM CDT          multivitamin with minerals  :   multivitamin with minerals ; Status:   Documented ; Ordered As Mnemonic:   Vitamin D with Minerals oral tablet ; Simple Display Line:   1 tab(s), Oral, daily, 0 Refill(s) ; Catalog Code:   multivitamin with minerals ; Order Dt/Tm:   4/29/2021 12:09:44 PM CDT          omega-3 polyunsaturated fatty acids  :   omega-3 polyunsaturated fatty acids ; Status:   Documented ; Ordered As Mnemonic:   Fish Oil ; Simple Display Line:   1 tab, Oral, daily, 0 Refill(s) ; Catalog Code:   omega-3 polyunsaturated fatty acids ; Order Dt/Tm:   4/29/2021 12:09:11 PM CDT          pyridoxine  :   pyridoxine ; Status:   Documented ; Ordered As Mnemonic:   Vitamin B6 ; Simple Display Line:   1 tab, Oral, daily, 0 Refill(s) ; Catalog Code:   pyridoxine ; Order Dt/Tm:   4/29/2021 12:10:21 PM CDT          turmeric  :   turmeric ; Status:   Documented ; Ordered As Mnemonic:   turmeric 500 mg oral capsule ; Simple Display Line:   500 mg, 1 cap(s), Oral, daily, 0 Refill(s) ; Catalog Code:   turmeric ; Order Dt/Tm:   4/29/2021 12:09:23 PM CDT          vitamin E  :   vitamin E ; Status:   Documented ; Ordered As Mnemonic:   vitamin E ; Simple Display Line:   1 tab, Oral, daily, 0 Refill(s) ; Catalog Code:   vitamin E ; Order Dt/Tm:   4/29/2021 12:10:05 PM CDT            ID Risk Screen   Recent Travel History :   No recent travel   Family Member Travel History :   No recent travel   Other Exposure to Infectious Disease :   Unknown   COVID-19 Testing Status :   No COVID-19 test performed   Charlee Abarca CMA - 4/29/2021 12:02 PM CDT   Social History   Social History   (As Of: 4/29/2021 12:14:15 PM CDT)   Alcohol:        Current, Wine (5 oz), 1-2 times per week, 1 drinks/episode average.  2 drinks/episode maximum.  Ready to change: No.    (Last Updated: 10/5/2020 9:52:27 AM CDT by Loretta Xiao)          Tobacco:        Former smoker, quit more than 30 days ago, Cigarettes, 20 per day.  40 year(s).  Household tobacco concerns: No.   Comments:  10/5/2020 9:52 AM - Loretta Xiao: Quit in 2013   (Last Updated: 10/5/2020 9:52:03 AM CDT by Loretta Xiao)          Electronic Cigarette/Vaping:        Electronic Cigarette Use: Never.   (Last Updated: 4/29/2021 12:07:42 PM CDT by Charlee Abarca CMA)          Employment/School:        Unemployed   (Last Updated: 4/20/2012 1:33:34 PM CDT by Mark Barrera MD)          Home/Environment:        Marital status: .  Living situation: Home/Independent.  Smoker in household: No.  Injuries/Abuse/Neglect in household: No.  Feels unsafe at home: No.  Family/Friends available for support: Yes.  Risks in environment: Stairs.   (Last Updated: 10/5/2020 9:54:40 AM CDT by Loretta Xiao)          Nutrition/Health:        Type of diet: Regular.  Wants to lose weight: Yes.  Feels highly stressed: No.   (Last Updated: 10/5/2020 9:53:33 AM CDT by Loretta Xiao)          Exercise:  Occasional exercise      Exercise frequency: Occasional.   (Last Updated: 10/5/2020 9:54:07 AM CDT by Loretta Xiao)          Sexual:        Sexually active: No.  Identifies as female, Sexual orientation: Straight or heterosexual.  History of STD: No.  Contraceptive Use Details: Abstinence.  History of sexual abuse: No.   (Last Updated: 10/5/2020 9:55:06 AM CDT by Loretta Xiao)

## 2022-02-15 NOTE — LETTER
(Inserted Image. Unable to display)     December 07, 2020      TU [address] [place], [place] [id number]          Dear TU,      Thank you for selecting [facility] ([facility] [place] [place] [facility]) for your healthcare needs.    Our records indicate you are due for the following services:     Hypertension check ~ please remember to bring your at-home blood pressure readings with you to your appointment.     (FYI   Regarding office visits: In some instances, a video visit or telephone visit may be offered as an option.)      To schedule an appointment or if you have further questions, please contact your primary clinic:   [facility]       [phone]   [facility]       [phone]              [facility]     [phone]      Powered by [employer] and [facility]    Sincerely,    Mark Barrera MD, FACP

## 2022-02-15 NOTE — TELEPHONE ENCOUNTER
Entered by Shelly Grijalva CMA on August 17, 2020 11:11:58 AM CDT  Due for AWV now per JO. One month protocol w/ message      ** Patient matched by Shelly Grijalva CMA on 8/17/2020 11:10:45 AM CDT **      ------------------------------------------  From: [place]  To: Mark Barrera MD  Sent: August 13, 2020 8:11:47 AM CDT  Subject: Medication Management  Due: August 12, 2020 4:14:54 PM CDT     ** On Hold Pending Signature **     Dispensed Drug: simvastatin (simvastatin 40 mg oral tablet), TAKE 1 TABLET BY MOUTH AT BEDTIME  Quantity: 30 unknown unit  Days Supply: 0  Refills: 0  Substitutions Allowed  Notes from Pharmacy:  ---------------------------------------------------------------  From: Shelly Grijalva CMA   To: [facility]    Sent: 8/17/2020 11:12:46 AM CDT  Subject: Medication Management     ** Submitted: **  Order:simvastatin (simvastatin 40 mg oral tablet)  1 tab(s)  Oral  hs  Qty:  30 tab(s)        Refills:  0          Substitutions Allowed     Route To Pharmacy - [facility]    Signed by Shelly Grijalva CMA  8/17/2020 4:12:00 PM [facility]    ** Submitted: **  Complete:simvastatin (simvastatin 40 mg oral tablet)   Signed by Shelly Grijalva CMA  8/17/2020 4:12:00 PM [facility]    ** Not Approved:  **  simvastatin (SIMVASTATIN 40MG TABLET)  TAKE 1 TABLET BY MOUTH AT BEDTIME  Qty:  30 unknown unit        Days Supply:  0        Refills:  0          Substitutions Allowed     Route To Pharmacy - [facility]   Signed by Shelly Grijalva CMA

## 2022-02-15 NOTE — PROGRESS NOTES
"Chief Complaint    c/ of jaw pain x 2 weeks  History of Present Illness       Pamela complains of right sublingual discomfort.  She has a history of \"TMJ\" going back quite some time.  About 2 weeks ago she had a left jaw discomfort saw the chiropractor who was able to relieve it but subsequently developed the right jaw discomfort.  She describes discomfort with opening her mouth.  No headache.  No difficulty swallowing or speaking.  No pharyngitis.  Quit smoking about 8 years ago.  Review of Systems       No fever, chills, headache, myalgia, cough, dyspnea, chest pain, polyuria, or polydipsia.  Physical Exam   Vitals & Measurements    T: 98.1  F (Tympanic)  HR: 80 (Peripheral)  BP: 128/70     WT: 187.6 lb        Patient is an overweight woman in no distress.  Alert and oriented.  Eyes appear normal.  HEENT exam on visual inspection is symmetric.  TMs and canals clear.  Oropharynx unremarkable.  No visible or palpable lesion on my exam.  Sinuses nontender.  No jaw or gland tenderness.  Neck is without adenopathy or thyromegaly.  Chest clear.  Cranial nerves normal.  Assessment/Plan       Back pain (M54.9)          Chronic and unchanged.         Ordered:          07771 office o/p est mod 30-39 min (Charge), Quantity: 1, Pain of submandibular gland  Mild major depression  HTN, goal below 140/90  Glucose intolerance (impaired glucose tolerance)  Dyslipidemia, goal LDL below 130  Chronic Obstructive Pulmonary Disease (COPD)  Back pain                Chronic Obstructive Pulmonary Disease (COPD) (J44.9)          Stable         Ordered:          58448 office o/p est mod 30-39 min (Charge), Quantity: 1, Pain of submandibular gland  Mild major depression  HTN, goal below 140/90  Glucose intolerance (impaired glucose tolerance)  Dyslipidemia, goal LDL below 130  Chronic Obstructive Pulmonary Disease (COPD)  Back pain                Dyslipidemia, goal LDL below 130 (E78.5)          On therapy.         Ordered:        "   14352 office o/p est mod 30-39 min (Charge), Quantity: 1, Pain of submandibular gland  Mild major depression  HTN, goal below 140/90  Glucose intolerance (impaired glucose tolerance)  Dyslipidemia, goal LDL below 130  Chronic Obstructive Pulmonary Disease (COPD)  Back pain                Glucose intolerance (impaired glucose tolerance) (R73.02)          Stable         Ordered:          42611 office o/p est mod 30-39 min (Charge), Quantity: 1, Pain of submandibular gland  Mild major depression  HTN, goal below 140/90  Glucose intolerance (impaired glucose tolerance)  Dyslipidemia, goal LDL below 130  Chronic Obstructive Pulmonary Disease (COPD)  Back pain                HTN, goal below 140/90 (I10)          Controlled         Ordered:          06512 office o/p est mod 30-39 min (Charge), Quantity: 1, Pain of submandibular gland  Mild major depression  HTN, goal below 140/90  Glucose intolerance (impaired glucose tolerance)  Dyslipidemia, goal LDL below 130  Chronic Obstructive Pulmonary Disease (COPD)  Back pain                Mild major depression (F32.0)          Stable         Ordered:          15466 office o/p est mod 30-39 min (Charge), Quantity: 1, Pain of submandibular gland  Mild major depression  HTN, goal below 140/90  Glucose intolerance (impaired glucose tolerance)  Dyslipidemia, goal LDL below 130  Chronic Obstructive Pulmonary Disease (COPD)  Back pain                Pain of submandibular gland (K11.8)          Little bit worrisome given her history of smoking.  We will get a CT soft tissues of the neck and an ENT consultation.         Ordered:          72211 office o/p est mod 30-39 min (Charge), Quantity: 1, Pain of submandibular gland  Mild major depression  HTN, goal below 140/90  Glucose intolerance (impaired glucose tolerance)  Dyslipidemia, goal LDL below 130  Chronic Obstructive Pulmonary Disease (COPD)  Back pain          CT Soft Tissue Neck w/ Contrast  (Request), Pain of submandibular gland          Referral (Request), 06/17/21 13:21:00 CDT, Referred to: Otolaryngology (ENT), Reason for referral: [place]om[place] right sublingual, Pain of submandibular gland          Review Orders for Potential Authorizations, 06/17/21 13:23:18 CDT           Patient Information     Name:TU CHAO      Address:      [address] [place] [id number]     Sex:Female     YOB: 1955     [phone]     Emergency Contact:SKIP CASTANEDA     MRN:[id number]     FIN:[id number]     Location:[facility]     Date of Service:06/17/2021      Primary Care Physician:       Mark Barrera MD, [phone]      Attending Physician:       Mark Barrera MD, [phone]  Problem List/Past Medical History    Ongoing     Back pain     Chronic Obstructive Pulmonary Disease (COPD)       Comments: Moderate severe COPD with FEV1 56% predicted     Dyslipidemia, goal LDL below 130       Comments: ATP3 10-year risk 6%     Eczema     Glucose intolerance (impaired glucose tolerance)     Headache     HTN, goal below 140/90     Mild major depression     Myofacial muscle pain     Nephrolithiasis     Obesity     Rhinitis, chronic     Seborrheic dermatitis     Tobacco user     Wellness examination    Historical     Gall stone pancreatitis  Procedure/Surgical History     ERCP (04/16/2018)     Laparoscopic cholecystectomy (04/15/2018)     Appendectomy (04/04/1984)     Left Kidney Surgery (1972)  Medications    Fish Oil, 1 tab, Oral, daily    FLUoxetine 10 mg oral capsule, 20 mg= 2 cap(s), Oral, daily, 3 refills    fluticasone 50 mcg/inh nasal spray, See Instructions, 11 refills    hydrochlorothiazide-triamterene 25 mg-37.5 mg oral tablet, See Instructions, 3 refills    Osteo [employer], 1 tab, Oral, daily    simvastatin 40 mg oral tablet, 40 mg= 1 tab(s), Oral, hs, 3 refills    turmeric 500 mg oral capsule, 500 mg= 1 cap(s), Oral, daily    Ventolin HFA 90  mcg/inh inhalation aerosol, 2 puff(s), Inhale, q4 hrs, PRN, 11 refills    Vitamin B6, 1 tab, Oral, daily    Vitamin C 500 mg oral tablet, 500 mg= 1 tab(s), Oral, daily    Vitamin D with Minerals oral tablet, 1 tab(s), Oral, daily    vitamin E, 1 tab, Oral, daily  Allergies    No Known Medication Allergies  Social History    Smoking Status     Former smoker     Alcohol      Current, Wine (5 oz), 1-2 times per week, 1 drinks/episode average. 2 drinks/episode maximum. Ready to change: No.     Electronic Cigarette/Vaping      Electronic Cigarette Use: Never.     Employment/School      Unemployed     Exercise - Occasional exercise      Exercise frequency: Occasional.     Home/Environment      Marital status: . Living situation: Home/Independent. Smoker in household: No. Injuries/Abuse/Neglect in household: No. Feels unsafe at home: No. Family/Friends available for support: Yes. Risks in environment: Stairs.     Nutrition/Health      Type of diet: Regular. Wants to lose weight: Yes. Feels highly stressed: No.     Sexual      Sexually active: No. Identifies as female, Sexual orientation: Straight or heterosexual. History of STD: No. Contraceptive Use Details: Abstinence. History of sexual abuse: No.     Tobacco      Former smoker, quit more than 30 days ago, Cigarettes, 20 per day. 40 year(s). Household tobacco concerns: No.  Family History    Arthritis: Mother.    COPD: Mother and Father.    Cancer: Grandparent.    Depression: Mother.    Diabetes mellitus: Father.    Hypertension: Mother, Father, Sister and Brother.    Lung cancer..: Sister.    Obesity: Mother.    Sleep apnea syndrome: Mother and Father.    Uterine cancer: Mother.  Immunizations          Scheduled Immunizations          Dose Date(s)          pneumococcal (PCV13)          03/02/2020          SARS-CoV-2 (COVID-19) Pfizer-162b2          03/04/2021, 03/25/2021          ZOS, shingles          09/18/2017          zoster vaccine, inactivated           07/17/2020          Other Immunizations          influenza virus vaccine, inactivated          09/17/2012, 12/17/2013, 10/02/2014, 09/28/2015, 09/22/2016, 09/18/2017, 09/28/2018, 10/02/2020          pneumococcal (PPSV23)          02/11/2016          tetanus/diphth/pertuss (Tdap) adult/adol          10/01/2018          DTaP          05/29/2008          ZOS, shingles          04/13/2018          pneumococcal (PCV13)          12/09/2014

## 2022-02-15 NOTE — TELEPHONE ENCOUNTER
Entered by Naima Simms on September 01, 2020 11:55:05 AM CDT  Med Refill      duplicate request.  filled per previous message      ** Patient matched by Naima Simms on 9/1/2020 11:53:16 AM CDT **      ------------------------------------------  From: [facility]  To: Mark Barrera MD  Sent: September 1, 2020 8:31:51 AM CDT  Subject: Medication Management  Due: September 1, 2020 8:09:49 PM CDT     ** On Hold Pending Signature **     Dispensed Drug: fluticasone nasal (fluticasone 50 mcg/inh nasal spray), INHALE 2 SPRAYS IN EACH NOSTRIL ONCE DAILY  Quantity: 16 unknown unit  Days Supply: 0  Refills: 0  Substitutions Allowed  Notes from Pharmacy:  ------------------------------------------

## 2022-02-15 NOTE — TELEPHONE ENCOUNTER
Entered by Mirza Phelps CMA on Linda 15, 2020 12:43:00 PM CDT  ---------------------  From: Mirza Phelps CMA   To: [facility]    Sent: 6/15/2020 12:43:00 PM CDT  Subject: Medication Management     ** Submitted: **  Complete:simvastatin (simvastatin 40 mg oral tablet)   Signed by Mirza Phelps CMA  6/15/2020 5:42:00 PM    ** Submitted: **  Complete:FLUoxetine (FLUoxetine 20 mg oral capsule)   Signed by Mirza Phelps CMA  6/15/2020 5:42:00 PM    ** Approved with modifications: **  simvastatin  TAKE ONE TABLET BY MOUTH AT BEDTIME  Qty:  30 unknown unit        Refills:  0          Substitutions Allowed     Details:  30 unknown unit, TAKE ONE TABLET BY MOUTH AT BEDTIME, Route to Pharmacy Electronically [facility], 6/10/2019, 3/11/2020, 0, Mark Barrera MD      Route To Pharmacy - [facility]   Signed by Mirza Phelps CMA    ** Approved with modifications: **  FLUoxetine  TAKE 1 CAPSULE BY MOUTH TWICE DAILY  Qty:  60 unknown unit        Refills:  0          Substitutions Allowed     Details:  60 unknown unit, TAKE 1 CAPSULE BY MOUTH TWICE DAILY, Route to Pharmacy [facility], 6/10/2019, 3/11/2020, 0, Mark Barrera MD      [facility]   Note to Pharmacy:  Pt due for annual exam and fasting labwork for further refills  Signed by Mirza Phelps CMA            ** Patient matched by Mirza Phelps CMA on 6/15/2020 12:40:06 PM CDT **      ------------------------------------------  From: [employer]  To: Mark Barrera MD  Sent: Linda 15, 2020 8:58:09 AM CDT  Subject: Medication Management  Due: June 5, 2020 4:00:35 PM CDT     ** On Hold Pending Signature **     Dispensed Drug: simvastatin (simvastatin 40 mg oral tablet), TAKE ONE TABLET BY MOUTH AT BEDTIME  Quantity: 30 unknown unit  Days Supply: 0  Refills: 0  Substitutions Allowed  Notes from Pharmacy:     ** On Hold Pending Signature **     Dispensed Drug: FLUoxetine (FLUoxetine 10 mg oral capsule), TAKE 1 CAPSULE BY  MOUTH TWICE DAILY  Quantity: 60 unknown unit  Days Supply: 0  Refills: 0  Substitutions Allowed  Notes from Pharmacy:  ------------------------------------------Med Refill      Date of last office visit and reason:  6-10-19 medical Fu      Date of last Med Check / Px:     Date of last labs pertaining to med:     Note:  Rx filled per protocol.  Mirza Phelps CMA    RTC order in chart:  yes    For Protocol refill, has patient been contacted:  _

## 2022-02-15 NOTE — PROGRESS NOTES
Chief Complaint    Patient presents for back spasms above her sacrum ongoing for a month. No known injury. Does see chiropractic but back spasms were improving now are getting worse cannot twist or turn very much.  History of Present Illness      Chief complaint as above reviewed and confirmed with patient.  Pt presents to the clinic with concerns re: back pain.  Has been present for months, but wore in hte last few days as she did some pulling of the leaves.  Has pain with positional changes, bending, twisting.  Difficulty rolling over in bed but once in place no difficulty with sleep.  No B/B dysfunction, no T/N/W of LE.  Pain is well localized to the mid low back without radiation.  Has seen chiropractor several times without improvement.  Has tried ibuprofen occasionally but not consistently helpful.  Tylenol not helpful.  ice is some help.  Has not done PT for many years.  No dysuria, frequency, urgency or hematuria. Does not seem to be similar to previous kidney stones.       no abdominal pain.  Review of Systems      Review of systems is negative with the exception of those noted in HPI          Physical Exam   Vitals & Measurements    T: 97.2  F (Tympanic)  HR: 78 (Peripheral)  BP: 138/74  SpO2: 96%     WT: 192.8 lb       nad appears well, ambulates slowly and positional changes slowly due to pain       Exam of the back reveals no midline tenderness of the T or L spine      Pt able to forward flex: to approximately 20 degrees.  limited by pain.       Resuming upright does increase pain.        Pain with Flexion, extension, lateral flexion and rotation B.       Muscular strength, sensation and DTR are symmetrical and WNL for LE B.        SLR negative B      Figure 4 negative B        No CVAT       Abdomen: BS active, soft, nontender to light or deep palpation.  no pulsatile masses       Peripheral pulses intact at femoral and DP pulses   Assessment/Plan       Low back pain (M54.5)         PT referral.   naproxen and robaxin for pain.  Ice, heat.  FU with pcp for persistent or worsening sx.  encouraged use of lidocaine 4%, also can try biofreeze.  Discussed red flags and natural course.  Caution re: sedation with robaxin. FU prn .         Ordered:          PT (Request), Priority: Soon, Instructions: eval and treat, Low back pain                Orders:         methocarbamol, = 2 tab(s) ( 1,000 mg ), Oral, tid, # 60 tab(s), 0 Refill(s), Type: Acute, Pharmacy: [facility], 2 tab(s) Oral tid, 63, in, 10/02/20 11:17:00 CDT, Height Measured, 192.8, lb, 04/29/21 12:02:00 CDT, Weight Measured, (Ordered)         naproxen, = 1 tab(s) ( 500 mg ), Oral, bid, # 60 tab(s), 0 Refill(s), Type: Acute, Pharmacy: [facility], 1 tab(s) Oral bid, 63, in, 10/02/20 11:17:00 CDT, Height Measured, 192.8, lb, 04/29/21 12:02:00 CDT, Weight Measured, (Ordered)  Patient Information     Name:TU CHAO      Address:      [address] [place] [id number]     Sex:Female     YOB: 1955     [phone]     Emergency Contact:SKIP CASTANEDA     MRN:[id number]     FIN:[id number]     Location:[facility]     Date of Service:04/29/2021      Primary Care Physician:       Mark Barrera MD, [phone]      Attending Physician:       Malgorzata Goodman PA-C, [phone]  Problem List/Past Medical History    Ongoing     Back pain     Chronic Obstructive Pulmonary Disease (COPD)       Comments: Moderate severe COPD with FEV1 56% predicted     Dyslipidemia, goal LDL below 130       Comments: ATP3 10-year risk 6%     Eczema     Glucose intolerance (impaired glucose tolerance)     Headache     HTN, goal below 140/90     Mild major depression     Myofacial muscle pain     Nephrolithiasis     Obesity     Rhinitis, chronic     Seborrheic dermatitis     Tobacco user     Wellness examination    Historical     Gall stone pancreatitis  Procedure/Surgical History     ERCP (04/16/2018)           Laparoscopic  cholecystectomy (04/15/2018)           Appendectomy (04/04/1984)           Left Kidney Surgery (1972)        Medications    Fish Oil, 1 tab, Oral, daily    FLUoxetine 10 mg oral capsule, 20 mg= 2 cap(s), Oral, daily, 3 refills    fluticasone 50 mcg/inh nasal spray, See Instructions, 11 refills    hydrochlorothiazide-triamterene 25 mg-37.5 mg oral tablet, See Instructions, 3 refills    naproxen 500 mg oral tablet, 500 mg= 1 tab(s), Oral, bid    Osteo [employer], 1 tab, Oral, daily    Robaxin 500 mg oral tablet, 1000 mg= 2 tab(s), Oral, tid    simvastatin 40 mg oral tablet, 40 mg= 1 tab(s), Oral, hs, 3 refills    turmeric 500 mg oral capsule, 500 mg= 1 cap(s), Oral, daily    Ventolin HFA 90 mcg/inh inhalation aerosol, 2 puff(s), Inhale, q4 hrs, PRN, 11 refills    Vitamin B6, 1 tab, Oral, daily    Vitamin C 500 mg oral tablet, 500 mg= 1 tab(s), Oral, daily    Vitamin D with Minerals oral tablet, 1 tab(s), Oral, daily    vitamin E, 1 tab, Oral, daily  Allergies    No Known Medication Allergies  Social History    Smoking Status     Former smoker     Alcohol      Current, Wine (5 oz), 1-2 times per week, 1 drinks/episode average. 2 drinks/episode maximum. Ready to change: No.     Electronic Cigarette/Vaping      Electronic Cigarette Use: Never.     Employment/School      Unemployed     Exercise - Occasional exercise      Exercise frequency: Occasional.     Home/Environment      Marital status: . Living situation: Home/Independent. Smoker in household: No. Injuries/Abuse/Neglect in household: No. Feels unsafe at home: No. Family/Friends available for support: Yes. Risks in environment: Stairs.     Nutrition/Health      Type of diet: Regular. Wants to lose weight: Yes. Feels highly stressed: No.     Sexual      Sexually active: No. Identifies as female, Sexual orientation: Straight or heterosexual. History of STD: No. Contraceptive Use Details: Abstinence. History of sexual abuse: No.     Tobacco       Former smoker, quit more than 30 days ago, Cigarettes, 20 per day. 40 year(s). Household tobacco concerns: No.  Family History    Arthritis: Mother.    COPD: Mother and Father.    Cancer: Grandparent.    Depression: Mother.    Diabetes mellitus: Father.    Hypertension: Mother, Father, Sister and Brother.    Lung cancer..: Sister.    Obesity: Mother.    Sleep apnea syndrome: Mother and Father.    Uterine cancer: Mother.  Immunizations      Vaccine Date Status          SARS-CoV-2 (COVID-19) Pfizer-162b2 03/25/2021 Recorded          SARS-CoV-2 (COVID-19) Pfizer-162b2 03/04/2021 Recorded          influenza virus vaccine, inactivated 10/02/2020 Given          zoster vaccine, inactivated 07/17/2020 Recorded          pneumococcal (PCV13) 03/02/2020 Recorded          tetanus/diphth/pertuss (Tdap) adult/adol 10/01/2018 Recorded              Comments : [10/3/2018] [place]          influenza virus vaccine, inactivated 09/28/2018 Given          ZOS, shingles 04/13/2018 Recorded              Comments : [4/13/2018] [facility] Recombinant Zoster (shingles) Vaccine          influenza virus vaccine, inactivated 09/18/2017 Given          ZOS, shingles 09/18/2017 Recorded          influenza virus vaccine, inactivated 09/22/2016 Given          pneumococcal (PPSV23) 02/11/2016 Given          influenza virus vaccine, inactivated 09/28/2015 Given          pneumococcal (PCV13) 12/09/2014 Given          influenza virus vaccine, inactivated 10/02/2014 Given          influenza virus vaccine, inactivated 12/17/2013 Given          influenza virus vaccine, inactivated 09/17/2012 Given          DTaP 05/29/2008 Recorded

## 2022-02-15 NOTE — PROGRESS NOTES
Patient:   TU CHAO            MRN: [id number]            FIN: [id number]               Age:   66 years     Sex:  Female     :  1955   Associated Diagnoses:   Ankle pain   Author:   Roshan Soto PA-C      Chief Complaint   2021 11:49 AM CST  right ankle pain for the past week. swollen at the end of day.        History of Present Illness   1 week hx of right ankle pain, no acute injury, pain increases as the day goes on  has used ice,  taking 600 mg 3 times a day      Review of Systems   Constitutional:  Negative.    Ear/Nose/Mouth/Throat:  Negative.    Respiratory:  Negative.    Gastrointestinal:  Negative.       Health Status   Allergies:    Allergic Reactions (Selected)  No Known Medication Allergies   Medications:  (Selected)   Prescriptions  Prescribed  FLUoxetine 10 mg oral capsule: = 1 cap(s) ( 10 mg ), Oral, bid, x 90 day(s), # 180 cap(s), 3 Refill(s), Type: Physician Stop, Pharmacy: [facility], 1 cap(s) Oral bid,x90 day(s), 63, in, 10/02/20 11:17:00 CDT, Height Measured, 182, lb, 21 9:14:00 CDT, Weight Measured  Ventolin HFA 90 mcg/inh inhalation aerosol: 2 puff(s), inh, q4 hrs, PRN: as needed for wheezing, # 2 EA, 11 Refill(s), Pharmacy: [facility], 2 puff(s) Inhale q4 hrs,PRN:as needed for wheezing, 63, in, 10/02/20 11:17:00 CDT, Height Measured, 182, lb, 21 9:14:00 CDT, Weight Measured  fluticasone 50 mcg/inh nasal spray: See Instructions, Instructions: INHALE 2 SPRAYS IN EACH NOSTRIL ONCE DAILY, # 16 unknown unit, 11 Refill(s), Type: Maintenance, Pharmacy: [facility], INHALE 2 SPRAYS IN EACH NOSTRIL ONCE DAILY, 63, in, 10/02/20 11:17:00 CDT, Height Measured, 182, lb...  hydrochlorothiazide-triamterene 25 mg-37.5 mg oral tablet: See Instructions, Instructions: 0.5 tab(s) Oral daily, # 45 EA, 3 Refill(s), Type: Maintenance, Pharmacy: [facility], 0.5 tab(s) Oral daily, 63, in, 10/02/20 11:17:00 CDT, Height Measured, 182, lb, 21 9:14:00 CDT, Weight  Measured  simvastatin 40 mg oral tablet: = 1 tab(s) ( 40 mg ), Oral, hs, # 90 tab(s), 3 Refill(s), Type: Maintenance, Pharmacy: [facility], Annual due now, 1 tab(s) Oral hs, 63, in, 10/02/20 11:17:00 CDT, Height Measured, 182, lb, 07/13/21 9:14:00 CDT, Weight Measured  Documented Medications  Documented  Fish Oil: 1 tab, Oral, daily, 0 Refill(s), Type: Maintenance  Osteo Bi-[employer]: 1 tab, Oral, daily, 0 Refill(s), Type: Maintenance  Vitamin B6: 1 tab, Oral, daily, 0 Refill(s), Type: Maintenance  Vitamin C 500 mg oral tablet: = 1 tab(s) ( 500 mg ), Oral, daily, 0 Refill(s), Type: Maintenance  Vitamin D with Minerals oral tablet: 1 tab(s), Oral, daily, 0 Refill(s), Type: Maintenance  turmeric 500 mg oral capsule: = 1 cap(s) ( 500 mg ), Oral, daily, 0 Refill(s), Type: Maintenance  vitamin E: 1 tab, Oral, daily, 0 Refill(s), Type: Maintenance   Problem list:    All Problems (Selected)  HTN, goal below 140/90 / SNOMED CT 6760295336 / Confirmed  Rhinitis, chronic / SNOMED CT 631172056 / Confirmed  Mild major depression / SNOMED CT 481791070 / Confirmed  Nephrolithiasis / SNOMED CT 371683510 / Confirmed  Glucose intolerance (impaired glucose tolerance) / SNOMED CT 45290634 / Confirmed  Chronic Obstructive Pulmonary Disease (COPD) / SNOMED CT 81286989 / Confirmed  Back pain / SNOMED CT 970892622 / Confirmed  Obesity / SNOMED CT 4819034583 / Probable  Myofacial muscle pain / SNOMED CT 671300001 / Confirmed  Headache / SNOMED CT 13635105 / Confirmed  Wellness examination / SNOMED CT 385077286 / Confirmed  Eczema / SNOMED CT 02971889 / Confirmed  Seborrheic dermatitis / SNOMED CT 10161685 / Confirmed  Dyslipidemia, goal LDL below 130 / SNOMED CT 28364573 / Confirmed      Histories   Past Medical History:    Active  Mild major depression (255830683): Onset on 9/17/2012 at 57 years.  Obesity (7204203878)  Eczema (34937235)  Seborrheic dermatitis (61979091)  Headache (10543570)  Back pain  (944248121)  Dyslipidemia, goal LDL below 130 (39432859)  Comments:  6/1/2012 CDT 11:37 AM CDT - Holly PINON, Mark  ATP3 10-year risk 6%  Rhinitis, chronic (192831868)  Myofacial muscle pain (294169780)  Resolved  Gall stone pancreatitis (708514088):  Resolved.   Procedure history:    ERCP (0758087161) on 4/16/2018 at 62 Years.  Laparoscopic cholecystectomy (04206386) on 4/15/2018 at 62 Years.  Appendectomy (270181856) on 4/5/1984 at 28 Years.  Left Kidney Surgery in 1972 at 17 Years.      Physical Examination   Vital Signs   12/28/2021 11:49 AM CST Temperature Tympanic 97.3 DegF  LOW    Peripheral Pulse Rate 80 bpm    Pulse Site Radial artery    HR Method Manual    Systolic Blood Pressure 132 mmHg  HI    Diastolic Blood Pressure 74 mmHg    Mean Arterial Pressure 93 mmHg    BP Site Right arm    BP Method Manual      Measurements from flowsheet : Measurements   12/28/2021 11:49 AM CST Height Measured - Standard 63 in    Weight Measured - Standard 185 lb    BSA 1.93 m2    Body Mass Index 32.77 kg/m2  HI      General:  No acute distress.    Musculoskeletal:  right ankle: no swelling or deformation,  good posterior tibial and dorsal pedal pulses,  no tenderness over either maleoli or on plantar surface, there is mild tenderness   over anterior ankle/talus region,   no pain with lateral or medial stress of foot.    Psychiatric:  Appropriate mood & affect.       Impression and Plan   Diagnosis     Ankle pain (AOJ87-UJ S96.911A).     Summary:  right ankle pain: continue to treat with ibuprofen, ice, given an Ace wrap, will add topical Voltaren to use prn  follow up if not improving.    Orders     Orders   Pharmacy:  Voltaren 1% topical gel (Prescribe): ( 2 gm ), Topical, qid, PRN: pain, # 100 gm, 0 Refill(s), Type: Maintenance, Pharmacy: [facility], 2 gm Topical qid,PRN:pain, 63, in, 12/28/2021 11:49 AM CST, Height Measured, 185, lb, 12/28/2021 11:49 AM CST, Weight Measured.     Orders   Charges (Evaluation and  Management):  55655 office o/p est low 20-29 min (Charge) (Order): Quantity: 1, Ankle pain.

## 2022-02-15 NOTE — LETTER
(Inserted Image. Unable to display)   May 29, 2019      TU CHAO      [place], [place] [id number]        Dear TU,    Thank you for selecting [facility] (previously [facility]) for your healthcare needs.  Below you will find the results of your recent tests done at our clinic.     Results are acceptable.      Result Name Current Result Previous Result Reference Range   Glucose Level (mg/dL)  96 5/28/2019 ((H)) 114 9/28/2018 65 - 139   Hgb A1c ((H)) 6.2 5/28/2019 ((H)) 6.1 9/12/2017  - <5.7   Ur Microalbumin/Creatinine Ratio  9 5/28/2019  8 9/12/2017  - <30       Please contact me or my assistant at [phone] if you have any questions.     Sincerely,        Mark Barrera MD

## 2022-02-15 NOTE — PROGRESS NOTES
Chief Complaint    1. Medication refill 2. medical f/u  History of Present Illness       Patient has no complaints.  Breathing has been good.  He has a new cat.  Denies depressed mood or anhedonia.  Review of Systems       No fever, chills, cough, dyspnea.  Physical Exam   Vitals & Measurements    T: 98.1  F (Tympanic)  HR: 71 (Peripheral)  BP: 123/80     WT: 182.0 lb        Appears comfortable.  Alert and oriented.  In good spirits.  Chest clear.  Heart exam regular.  No edema.  Assessment/Plan       Dyslipidemia, goal LDL below 130 (E78.5)          Lab today.         Ordered:          20075 office o/p est mod 30-39 min (Charge), Quantity: 1, Glucose intolerance (impaired glucose tolerance)  Dyslipidemia, goal LDL below 130  Mild major depression  HTN, goal below 140/90  Obesity          Lipid panel with reflex to direct ldl* ([facility]), Specimen Type: Serum, Collection Date: 07/13/21 9:34:00 CDT                Glucose intolerance (impaired glucose tolerance) (R73.02)          Stable         Ordered:          82627 office o/p est mod 30-39 min (Charge), Quantity: 1, Glucose intolerance (impaired glucose tolerance)  Dyslipidemia, goal LDL below 130  Mild major depression  HTN, goal below 140/90  Obesity          Basic Metabolic Panel* ([facility]), Specimen Type: Serum, Collection Date: 07/13/21 9:34:00 CDT          Hemoglobin A1c* ([facility]), Specimen Type: Blood, Collection Date: 07/13/21 9:34:00 CDT                HTN, goal below 140/90 (I10)          Controlled         Ordered:          36308 office o/p est mod 30-39 min (Charge), Quantity: 1, Glucose intolerance (impaired glucose tolerance)  Dyslipidemia, goal LDL below 130  Mild major depression  HTN, goal below 140/90  Obesity          Basic Metabolic Panel* ([facility]), Specimen Type: Serum, Collection Date: 07/13/21 9:34:00 CDT                Mild major depression (F32.0)          Stable         Ordered:          69630 office o/p est mod 30-39 min (Charge),  Quantity: 1, Glucose intolerance (impaired glucose tolerance)  Dyslipidemia, goal LDL below 130  Mild major depression  HTN, goal below 140/90  Obesity                Obesity (Probable) (E66.9)          Encouraged weight loss         Ordered:          59759 office o/p est mod 30-39 min (Charge), Quantity: 1, Glucose intolerance (impaired glucose tolerance)  Dyslipidemia, goal LDL below 130  Mild major depression  HTN, goal below 140/90  Obesity                Orders:         albuterol, 2 puff(s), inh, q4 hrs, PRN: as needed for wheezing, # 2 EA, 11 Refill(s), Pharmacy: [facility], 2 puff(s) Inhale q4 hrs,PRN:as needed for wheezing, 63, in, 10/02/20 11:17:00 CDT, Height Measured, 182, lb, 07/13/21 9:14:00 CDT, Weight Measured, (Ordered)         FLUoxetine, = 1 cap(s) ( 10 mg ), Oral, bid, x 90 day(s), # 180 cap(s), 3 Refill(s), Type: Physician Stop, Pharmacy: [facility], 1 cap(s) Oral bid,x90 day(s), 63, in, 10/02/20 11:17:00 CDT, Height Measured, 182, lb, 07/13/21 9:14:00 CDT, Weight Measured, (Ordered)         fluticasone nasal, See Instructions, Instructions: INHALE 2 SPRAYS IN EACH NOSTRIL ONCE DAILY, # 16 unknown unit, 11 Refill(s), Type: Maintenance, Pharmacy: [facility], INHALE 2 SPRAYS IN EACH NOSTRIL ONCE DAILY, 63, in, 10/02/20 11:17:00 CDT, Height Measured, 182, lb..., (Ordered)         hydrochlorothiazide-triamterene, See Instructions, Instructions: 0.5 tab(s) Oral daily, # 45 EA, 3 Refill(s), Type: Maintenance, Pharmacy: [facility], 0.5 tab(s) Oral daily, 63, in, 10/02/20 11:17:00 CDT, Height Measured, 182, lb, 07/13/21 9:14:00 CDT, Weight Measured, (Ordered)         simvastatin, = 1 tab(s) ( 40 mg ), Oral, hs, # 90 tab(s), 3 Refill(s), Type: Maintenance, Pharmacy: [facility], Annual due now, 1 tab(s) Oral hs, 63, in, 10/02/20 11:17:00 CDT, Height Measured, 182, lb, 07/13/21 9:14:00 CDT, Weight Measured, (Ordered)         Return to Clinic (Request), RFV: Annual Wellness and BP check,  Return in 6 months  Patient Information     Name:TU CHAO      Address:      [address] [place], [place] [id number]     Sex:Female     YOB: 1955     [phone]     Emergency Contact:SKIP CASTANEDA     MRN:[id number]     FIN:[id number]     Location:[facility]     Date of Service:07/13/2021      Primary Care Physician:       Mark Barrera MD, [phone]      Attending Physician:       Mark Barrera MD, [phone]  Problem List/Past Medical History    Ongoing     Back pain     Chronic Obstructive Pulmonary Disease (COPD)       Comments: Moderate severe COPD with FEV1 56% predicted     Dyslipidemia, goal LDL below 130       Comments: ATP3 10-year risk 6%     Eczema     Glucose intolerance (impaired glucose tolerance)     Headache     HTN, goal below 140/90     Mild major depression     Myofacial muscle pain     Nephrolithiasis     Obesity     Rhinitis, chronic     Seborrheic dermatitis     Tobacco user     Wellness examination    Historical     Gall stone pancreatitis  Procedure/Surgical History     ERCP (04/16/2018)     Laparoscopic cholecystectomy (04/15/2018)     Appendectomy (04/04/1984)     Left Kidney Surgery (1972)  Medications    Fish Oil, 1 tab, Oral, daily    FLUoxetine 10 mg oral capsule, 10 mg= 1 cap(s), Oral, bid, 3 refills    fluticasone 50 mcg/inh nasal spray, See Instructions, 11 refills    hydrochlorothiazide-triamterene 25 mg-37.5 mg oral tablet, See Instructions, 3 refills    Osteo [employer], 1 tab, Oral, daily    simvastatin 40 mg oral tablet, 40 mg= 1 tab(s), Oral, hs, 3 refills    turmeric 500 mg oral capsule, 500 mg= 1 cap(s), Oral, daily    Ventolin HFA 90 mcg/inh inhalation aerosol, 2 puff(s), Inhale, q4 hrs, PRN, 11 refills    Vitamin B6, 1 tab, Oral, daily    Vitamin C 500 mg oral tablet, 500 mg= 1 tab(s), Oral, daily    Vitamin D with Minerals oral tablet, 1 tab(s), Oral, daily    vitamin E, 1 tab, Oral, daily  Allergies     No Known Medication Allergies  Social History    Smoking Status     Former smoker     Alcohol      Current, Wine (5 oz), 1-2 times per week, 1 drinks/episode average. 2 drinks/episode maximum. Ready to change: No.     Electronic Cigarette/Vaping      Electronic Cigarette Use: Never.     Employment/School      Unemployed     Exercise - Occasional exercise      Exercise frequency: Occasional.     Home/Environment      Marital status: . Living situation: Home/Independent. Smoker in household: No. Injuries/Abuse/Neglect in household: No. Feels unsafe at home: No. Family/Friends available for support: Yes. Risks in environment: Stairs.     Nutrition/Health      Type of diet: Regular. Wants to lose weight: Yes. Feels highly stressed: No.     Sexual      Sexually active: No. Identifies as female, Sexual orientation: Straight or heterosexual. History of STD: No. Contraceptive Use Details: Abstinence. History of sexual abuse: No.     Tobacco      Former smoker, quit more than 30 days ago, Cigarettes, 20 per day. 40 year(s). Household tobacco concerns: No.  Family History    Arthritis: Mother.    COPD: Mother and Father.    Cancer: Grandparent.    Depression: Mother.    Diabetes mellitus: Father.    Hypertension: Mother, Father, Sister and Brother.    Lung cancer..: Sister.    Obesity: Mother.    Sleep apnea syndrome: Mother and Father.    Uterine cancer: Mother.  Immunizations          Scheduled Immunizations          Dose Date(s)          pneumococcal (PCV13)          03/02/2020          SARS-CoV-2 (COVID-19) Pfizer-162b2          03/04/2021, 03/25/2021          ZOS, shingles          09/18/2017          zoster vaccine, inactivated          07/17/2020          Other Immunizations          influenza virus vaccine, inactivated          09/17/2012, 12/17/2013, 10/02/2014, 09/28/2015, 09/22/2016, 09/18/2017, 09/28/2018, 10/02/2020          pneumococcal (PPSV23)          02/11/2016          tetanus/diphth/pertuss (Tdap)  adult/adol          10/01/2018          DTaP          05/29/2008          MICHAELAS, shingles          04/13/2018          pneumococcal (PCV13)          12/09/2014

## 2022-02-15 NOTE — PROGRESS NOTES
Patient:   TU CHAO            MRN: [id number]            FIN: [id number]               Age:   65 years     Sex:  Female     :  1955   Associated Diagnoses:   Chronic Obstructive Pulmonary Disease (COPD); Dyslipidemia, goal LDL below 130; Glucose intolerance (impaired glucose tolerance); HTN, goal below 140/90; Mild major depression; Obesity; Wellness examination   Author:   Mrak Barrera MD      Visit Information   Visit type:  Annual Exam.    Accompanied by:  No one.    Source of history:  Self.    History limitation:  None.       Chief Complaint   10/2/2020 10:54 AM CDT   AWV.      History of Present Illness    The patient is a 65-year-old with history of hypertension, chronic obstructive pulmonary disease, dyslipidemia, impaired glucose tolerance, depression, and obesity.  She quit smoking seven years ago.  She is active in her garden and outdoors in the summer.  She finds it very difficult to lose weight.  Overall she is happy with her health.      On review of systems she indicates that she wears glasses, has chronic bilateral tinnitus and high frequency hearing loss, shortness of breath occasionally with activity and finds that her inhalers help for that.  She has more trouble remembering names.  Complete review of systems otherwise negative.      Health History is reviewed and updated.       She did not complete the GDS (short form) but scores at least 2.       There are no significant positives on the Health Risk Assessment form.       We discussed colon cancer screening options and she would like to proceed with Cologuard testing.  We discussed mammography as well and also reviewed lung cancer screening, which she declined.           Review of Systems   See HPI       Health Status   Allergies:    Allergic Reactions (Selected)  No Known Medication Allergies   Medications:  (Selected)   Prescriptions  Prescribed  FLUoxetine 10 mg oral capsule: = 2 cap(s) ( 20 mg ), Oral, daily, x 90 day(s), #  180 cap(s), 3 Refill(s), Type: Physician Stop, Pharmacy: [facility], 2 cap(s) Oral daily,x90 day(s), 63, in, 06/10/19 10:45:00 CDT, Height Measured, Weight Measured  Ventolin HFA 90 mcg/inh inhalation aerosol: 2 puff(s), inh, q4 hrs, PRN: as needed for wheezing, # 2 EA, 11 Refill(s), Pharmacy: [facility], 2 puff(s) Inhale q4 hrs,PRN:as needed for wheezing, 63, in, 06/10/19 10:45:00 CDT, Height Measured, 194, lb, 07/13/20 14:26:00 CDT, Weight Measured  fluticasone 50 mcg/inh nasal spray: See Instructions, Instructions: INHALE 2 SPRAYS IN EACH NOSTRIL ONCE DAILY, # 16 unknown unit, 11 Refill(s), Type: Maintenance, Pharmacy: [facility], INHALE 2 SPRAYS IN EACH NOSTRIL ONCE DAILY, 63, in, 06/10/19 10:45:00 CDT, Height Measured, 194, lb...  hydrochlorothiazide-triamterene 25 mg-37.5 mg oral tablet: See Instructions, Instructions: 0.5 tab(s) Oral daily, # 45 EA, 3 Refill(s), Type: Maintenance, Pharmacy: [facility], 0.5 tab(s) Oral daily, 63, in, 06/10/19 10:45:00 CDT, Height Measured, Weight Measured  simvastatin 40 mg oral tablet: = 1 tab(s) ( 40 mg ), Oral, hs, # 90 tab(s), 3 Refill(s), Type: Maintenance, Pharmacy: [facility], Annual due now, 1 tab(s) Oral hs, 63, in, 06/10/19 10:45:00 CDT, Height Measured, 194, lb, 07/13/20 14:26:00 CDT, Weight Measured,    Medications          *denotes recorded medication          FLUoxetine 10 mg oral capsule: 20 mg, 2 cap(s), Oral, daily, for 90 day(s), 180 cap(s), 3 Refill(s).          Ventolin HFA 90 mcg/inh inhalation aerosol: 2 puff(s), inh, q4 hrs, PRN: as needed for wheezing, 2 EA, 11 Refill(s).          fluticasone 50 mcg/inh nasal spray: See Instructions, INHALE 2 SPRAYS IN EACH NOSTRIL ONCE DAILY, 16 unknown unit, 11 Refill(s).          hydrochlorothiazide-triamterene 25 mg-37.5 mg oral tablet: See Instructions, 0.5 tab(s) Oral daily, 45 EA, 3 Refill(s).          simvastatin 40 mg oral tablet: 40 mg, 1 tab(s), Oral, hs, 90 tab(s), 3 Refill(s).       Problem list:     All Problems  Backpain / ICD-9-.5 / Confirmed  Chronic Obstructive Pulmonary Disease (COPD) / SNOMED CT 73539768 / Confirmed  Dyslipidemia, goal LDL below 130 / SNOMED CT 04026803 / Confirmed  Eczema / SNOMED CT 59809270 / Confirmed  Glucose intolerance (impaired glucose tolerance) / SNOMED CT 45246263 / Confirmed  Headache / SNOMED CT 14404196 / Confirmed  HTN, goal below 140/90 / SNOMED CT 6910315159 / Confirmed  Mild major depression / SNOMED CT 752341802 / Confirmed  Myofacial muscle pain / SNOMED CT 297262133 / Confirmed  Nephrolithiasis / SNOMED CT 253923968 / Confirmed  Obesity / SNOMED CT 2437486726 / Probable  Rhinitis, chronic / SNOMED CT 107017769 / Confirmed  Seborrheic dermatitis / SNOMED CT 69109701 / Confirmed  Wellness examination / SNOMED CT 273664477 / Confirmed  Inactive: Tobacco user / ICD-9-.1  Resolved: Gall stone pancreatitis / SNOMED CT 415127278      Histories   Past Medical History:    Active  Mild major depression (495335961): Onset on 2012 at 57 years.  Obesity (8150583410)  Eczema (20714705)  Seborrheic dermatitis (96241736)  Headache (59289310)  Backpain (724.5)  Dyslipidemia, goal LDL below 130 (63802063)  Comments:  2012 CDT 11:37 AM CDT - Holly PINON, Mark  ATP3 10-year risk 6%  Rhinitis, chronic (618223071)  Myofacial muscle pain (966104940)  Resolved  Gall stone pancreatitis (913705283):  Resolved.   Family History:    Sleep apnea syndrome  Mother  Father  Hypertension  Mother  Father  Sister (Oralia, )  Brother (slava)  COPD  Father  Mother  Arthritis  Mother  Uterine cancer  Mother  Lung cancer..  Sister (Oralia, )  Obesity  Mother     Procedure history:    ERCP (7535734297) on 2018 at 62 Years.  Laparoscopic cholecystectomy (06331794) on 4/15/2018 at 62 Years.  Appendectomy (547821639) on 1984 at 28 Years.  Left Kidney Surgery in  at 17 Years.   Social History:        Alcohol Assessment            Current, 3 times per week, 1  drinks/episode average.      Tobacco Assessment            Past                     Comments:                      03/07/2014 - Holly PINON, Mark Gallo 2013      Employment and Education Assessment            Unemployed      Home and Environment Assessment            Marital status: .      Exercise and Physical Activity Assessment            Exercise frequency: Never.        Physical Examination   Vital Signs   10/2/2020 11:17 AM CDT Systolic Blood Pressure 126 mmHg    Diastolic Blood Pressure 72 mmHg    Mean Arterial Pressure 90 mmHg    BP Site Right arm    BP Method Manual   10/2/2020 10:54 AM CDT Temperature Tympanic 97.3 DegF  LOW    Peripheral Pulse Rate 71 bpm    HR Method Electronic    Systolic Blood Pressure 152 mmHg  HI    Diastolic Blood Pressure 84 mmHg  HI    Mean Arterial Pressure 107 mmHg    BP Site Right arm    BP Method Manual    Oxygen Saturation 92 %  LOW      Measurements from flowsheet : Measurements   10/2/2020 11:17 AM CDT Height Measured - Standard 63 in   10/2/2020 10:54 AM CDT Height Measured - Standard 63 in    Weight Measured - Standard 190 lb    BSA 1.96 m2    Body Mass Index 33.65 kg/m2  HI      General:  Alert and oriented, No acute distress.    Eye:  Normal conjunctiva.    HENT:  Normocephalic, Normal hearing, Oral mucosa is moist, No pharyngeal erythema.    Neck:  Supple, Non-tender, No carotid bruit, No lymphadenopathy, No thyromegaly.    Respiratory:  Lungs are clear to auscultation, Respirations are non-labored.    Cardiovascular:  Normal rate, Regular rhythm, No murmur, No gallop, Good pulses equal in all extremities, Normal peripheral perfusion, No edema.    Gastrointestinal:  Soft, Non-tender.    Musculoskeletal:  Normal gait.    Integumentary:  No pallor.    Neurologic:  No focal deficits.    Cognition and Speech:  Oriented, Speech clear and coherent, Functional cognition intact.    Psychiatric:  Cooperative, Appropriate mood & affect, Non-suicidal.        Review / Management   Results review:  Lab results   7/13/2020 3:09 PM CDT Sodium Level 140 mmol/L    Potassium Level 3.6 mmol/L    Chloride Level 105 mmol/L    CO2 Level 30 mmol/L    Glucose Level 103 mg/dL    BUN 27 mg/dL  HI    Creatinine Level 0.84 mg/dL    BUN/Creat Ratio 32  HI    eGFR 72 mL/min/1.73m2    eGFR African American 83 mL/min/1.73m2    Calcium Level 9.6 mg/dL    Hgb A1c 6.0  HI    Cholesterol 147 mg/dL    Non-HDL Cholesterol 88    HDL 59 mg/dL    Cholesterol/HDL Ratio 2.5    LDL 70    Triglyceride 94 mg/dL   .       Impression and Plan   Diagnosis     Chronic Obstructive Pulmonary Disease (COPD) (JLV86-XU J44.9).     Dyslipidemia, goal LDL below 130 (ZOS73-IV E78.5).     Glucose intolerance (impaired glucose tolerance) (KTS77-IL R73.02).     HTN, goal below 140/90 (QUS68-GR I10).     Mild major depression (LLP01-YM F32.0).     Obesity (FXT00-BE E66.9).     Wellness examination (TPM73-ET Z00.00).     Patient Instructions:       Counseled: Patient, Diet, Activity, Verbalized understanding, weight loss.    Orders     Orders (Selected)   Outpatient Orders  Ordered  Return to Clinic (Request): RFV: Annual Wellness and BP check, Return in 1-2 months  Return to Office (Request): RFV: Annual Lipids, BMP, XAVI. Hgb A1c q6 months  Prescriptions  Prescribed  FLUoxetine 10 mg oral capsule: = 2 cap(s) ( 20 mg ), Oral, daily, x 90 day(s), # 180 cap(s), 3 Refill(s), Type: Physician Stop, Pharmacy: [facility], 2 cap(s) Oral daily,x90 day(s), 63, in, 06/10/19 10:45:00 CDT, Height Measured, Weight Measured  Ventolin HFA 90 mcg/inh inhalation aerosol: 2 puff(s), inh, q4 hrs, PRN: as needed for wheezing, # 2 EA, 11 Refill(s), Pharmacy: [facility], 2 puff(s) Inhale q4 hrs,PRN:as needed for wheezing, 63, in, 06/10/19 10:45:00 CDT, Height Measured, 194, lb, 07/13/20 14:26:00 CDT, Weight Measured  fluticasone 50 mcg/inh nasal spray: See Instructions, Instructions: INHALE 2 SPRAYS IN EACH NOSTRIL ONCE DAILY, # 16  unknown unit, 11 Refill(s), Type: Maintenance, Pharmacy: [facility], INHALE 2 SPRAYS IN EACH NOSTRIL ONCE DAILY, 63, in, 06/10/19 10:45:00 CDT, Height Measured, 194, lb...  hydrochlorothiazide-triamterene 25 mg-37.5 mg oral tablet: See Instructions, Instructions: 0.5 tab(s) Oral daily, # 45 EA, 3 Refill(s), Type: Maintenance, Pharmacy: [facility], 0.5 tab(s) Oral daily, 63, in, 06/10/19 10:45:00 CDT, Height Measured, Weight Measured  simvastatin 40 mg oral tablet: = 1 tab(s) ( 40 mg ), Oral, hs, # 90 tab(s), 3 Refill(s), Type: Maintenance, Pharmacy: [facility], Annual due now, 1 tab(s) Oral hs, 63, in, 06/10/19 10:45:00 CDT, Height Measured, 194, lb, 07/13/20 14:26:00 CDT, Weight Measured.     Reviewed options and recommended  colon cancer screening.     Annual Mammo and Flu.     Complete Shingrix.

## 2022-02-15 NOTE — CARE COORDINATION
Patient:   PAMELA CHAO            MRN: [id number]            FIN: [id number]               Age:   62 years     Sex:  Female     :  1955   Associated Diagnoses:   None   Author:   Maricarmen Mcnulty CMA      Care Coordination Hospital Discharge Note    Sources of Information  [   ] Patient, family member, or caregiver (Please list): LMTCB at 1136am on 18                                                                                      Spoke with Pamela.  She is doing ok.  She is feeling a lot of bloating and gas, she has had 3-4 BMs and she is feeling some better.  She didn't feel like we needed to send out a message.  She will call back or return to the ER if anything changes.  She verbalized understanding of her discharge instructions, f/u appointment, she will call back.  She also verbalized understanding of her medications.  She did not have any questions or concerns.  [ X  ] Hospital discharge summary  [   ] Hospital fax  [   ] List of recent hospitalizations or ED visits  [   ] Other:     Discharged From: [facility]  Admission Date: 2018  Discharge Date: 2018     Discharge Plan: She was discharged today to home.     Diagnosis/Problem:   Acute biliary pancreatitis  Active Problems:  HTN (hypertension)  Other hyperlipidemia  Elevated LFTs  Cholelithiasis         Medication Changes: [ X  ] Yes [   ] No   Medication List Updated: [  X ] Yes [   ] No  START taking these medicines      Instructions   oxyCODONE 5 mg immediate release tablet  For diagnoses: Acute biliary pancreatitis without infection or necrosis  Commonly known as: ROXICODONE    Take 1 tablet by mouth every 6 hours if needed for Pain      sennosides 8.6 mg tablet  For diagnoses: Acute biliary pancreatitis without infection or necrosis  Commonly known as: SENNA    Take 1 tablet by mouth once daily.                  CONTINUE taking these medicines      Instructions   ANORO ELLIPTA 62.5-25 mcg/actuation inhaler  Generic drug:  umeclidinium-vilanterol    Inhale 1 Puff by mouth every morning.      FLONASE ALLERGY RELIEF spray  Generic drug: fluticasone (50 mcg per actuation) nasal    Inhale 2 Sprays in the nostril(s) once daily.      FLUoxetine 10 mg capsule  Commonly known as: PROZAC    Take 10 mg by mouth 2 times daily.      loratadine 10 mg tablet  Commonly known as: CLARITIN    Take 10 mg by mouth every morning.      MIGRAINE FORMULA 250-250-65 mg tablet  Generic drug: aspirin-acetaminophen-caffeine (250-250-65 mg)    Take 1 tablet by mouth every 6 hours if needed for Headache. Max acetaminophen dose: 4000mg in 24 hrs.      simvastatin 40 mg tablet  For diagnoses: Other hyperlipidemia  Start taking on: 4/23/2018  Commonly known as: ZOCOR    Take 1 tablet by mouth at bedtime.      triamterene-hydrochlorothiazide (37.5-25 mg) 37.5-25 mg tablet  Commonly known as: MAXZIDE-25    Take 0.5 tablets by mouth every morning.      VENTOLIN HFA 90 mcg/actuation inhaler  Generic drug: albuterol HFA    Inhale 1-2 Puffs by mouth 4 times daily if needed.        Needs Referral or Lab: [ X  ] Yes [   ] No  Tests and Studies needed -   Liver chemistry tests in 5 days  Follow up with urology for renal stones    Recommendations for outpatient provider    Specific recommendations to be addressed at the follow up visit - postoperative follow up      Reason(s) medications were stopped or changed -   Statin to be held for 1 weeks with elevated liver chemistry tests   Pain meds      Anticoagulation/Oxygen Recommendations -  None        Up as tolerated    Get regular activity and try to walk for a total of 30 minutes per day. Start by walking for 5 to 10 minutes at one time and slowly build to walking for 30 minutes one time.  Rest is also an important part of healing. Slowly return to your regular level of activity. Save your energy by spreading out activities that make you tired. Rest as needed.      What you may eat and drink after your hospital stay:    YOUR  RECOMMENDED SELECTION FOR MEALS ARE:  CLEAR LIQUID DIET 4/16/2018   You may advance to low fat diet on 04/17/18.  You may only eat or drink food items that are transparent (see through) and liquid at body temperature.  Foods allowed on this diet include pulp-free juice, gelatin, ice water, Popsicles , ice chips, broths, sweetened tea or coffee and soda.     Needs Follow-up Appointment:  [ X  ] Within6-10 days of discharge (highly complex visit)  [   ] Within 14 days of discharge (moderately complex visit)    Appointment Made With:  Date:    Additional Information Needed and Requested:  [   ] Yes:  [ X  ] Nof/u appt completed.  Maricarmen Mcnulty, [facility]

## 2022-02-15 NOTE — PROGRESS NOTES
Chief Complaint    Medication Refill, c/o rash under breasts- would like hydrocortisone cream refilled.  History of Present Illness      Pamela is a 65-year-old with hypertension, glucose intolerance, COPD, obesity, depression, chronic rhinitis, dyslipidemia who complains of a rash under her breasts.  She has been using hydrocortisone and to no effect.  She is otherwise been well.  Home blood pressure readings have been good and in fact she cut her hydrochlorothiazide down to half a tablet daily.  Review of Systems      No change in weight.  No headache or myalgia.  No cough, dyspnea, chest pain, edema.  No eye complaints.  No other skin complaints.  Physical Exam   Vitals & Measurements    T: 98.1   F (Tympanic)  HR: 77(Peripheral)  BP: 118/72  SpO2: 94%     WT: 194 lb       Patient is an obese woman in no distress.  Alert and oriented.  Eyes appear normal.  Patient exam unremarkable.  Neck thick.  Chest clear.  Cardiac exam is regular no murmur.  No edema.  Examination of the skin reveals Candida intertrigo with erythema under the breasts bilaterally a little bit of maceration but not much.  Assessment/Plan       Candidal intertrigo (B37.2)        Topical antifungal twice a day for as long as needed then barrier medications.  Return if not better.                 Chronic Obstructive Pulmonary Disease (COPD) (J44.9)         Stable         Ordered:          85253 office outpatient visit 25 minutes (Charge), Quantity: 1, Rhinitis, chronic  Obesity  Mild major depression  HTN, goal below 140/90  Glucose intolerance (impaired glucose tolerance)  Dyslipidemia, goal LDL below 130  Chronic Obstructive Pulmonary Disease (COPD)                Dyslipidemia, goal LDL below 130 (E78.5)         Stable         Ordered:          95540 office outpatient visit 25 minutes (Charge), Quantity: 1, Rhinitis, chronic  Obesity  Mild major depression  HTN, goal below 140/90  Glucose intolerance (impaired glucose tolerance)   Dyslipidemia, goal LDL below 130  Chronic Obstructive Pulmonary Disease (COPD)          Lipid panel with reflex to direct ldl* ([facility]), Specimen Type: Serum, Collection Date: 07/13/20 14:58:00 CDT                Glucose intolerance (impaired glucose tolerance) (R73.02)         Stable         Ordered:          01047 office outpatient visit 25 minutes (Charge), Quantity: 1, Rhinitis, chronic  Obesity  Mild major depression  HTN, goal below 140/90  Glucose intolerance (impaired glucose tolerance)  Dyslipidemia, goal LDL below 130  Chronic Obstructive Pulmonary Disease (COPD)          Hemoglobin A1c* ([facility]), Specimen Type: Blood, Collection Date: 07/13/20 14:58:00 CDT                HTN, goal below 140/90 (I10)         Controlled         Ordered:          30431 office outpatient visit 25 minutes (Charge), Quantity: 1, Rhinitis, chronic  Obesity  Mild major depression  HTN, goal below 140/90  Glucose intolerance (impaired glucose tolerance)  Dyslipidemia, goal LDL below 130  Chronic Obstructive Pulmonary Disease (COPD)          Basic Metabolic Panel* ([employer]), Specimen Type: Serum, Collection Date: 07/13/20 14:58:00 CDT                Mild major depression (F32.0)         Stable         Ordered:          19475 office outpatient visit 25 minutes (Charge), Quantity: 1, Rhinitis, chronic  Obesity  Mild major depression  HTN, goal below 140/90  Glucose intolerance (impaired glucose tolerance)  Dyslipidemia, goal LDL below 130  Chronic Obstructive Pulmonary Disease (COPD)                Obesity (Probable) (E66.9)         Unchanged.  Encouraged regular physical activity and weight loss.         Ordered:          31181 office outpatient visit 25 minutes (Charge), Quantity: 1, Rhinitis, chronic  Obesity  Mild major depression  HTN, goal below 140/90  Glucose intolerance (impaired glucose tolerance)  Dyslipidemia, goal LDL below 130  Chronic Obstructive Pulmonary Disease (COPD)                Rhinitis,  chronic (J31.0)         Stable         Ordered:          83333 office outpatient visit 25 minutes (Charge), Quantity: 1, Rhinitis, chronic  Obesity  Mild major depression  HTN, goal below 140/90  Glucose intolerance (impaired glucose tolerance)  Dyslipidemia, goal LDL below 130  Chronic Obstructive Pulmonary Disease (COPD)                Screening for osteoporosis (Z13.820)         DEXA.  Patient declines colon and breast cancer screening.         Ordered:          DEXA Scan (Request), Screening for osteoporosis                Orders:         albuterol, 2 puff(s), inh, q4 hrs, PRN: as needed for wheezing, # 2 EA, 11 Refill(s), Pharmacy: [facility], 2 puff(s) Inhale q4 hrs,PRN:as needed for wheezing, 63, in, 06/10/19 10:45:00 CDT, Height Measured, 194, lb, 07/13/20 14:26:00 CDT, Weight Measured, (Ordered)         clotrimazole topical, 1 edwige, Topical, bid, x 21 day(s), # 60 gm, 1 Refill(s), Type: Acute, Pharmacy: [facility], 1 edwige Topical bid,x21 day(s), 63, in, 06/10/19 10:45:00 CDT, Height Measured, 194, lb, 07/13/20 14:26:00 CDT, Weight Measured, (Ordered)         FLUoxetine, = 2 cap(s) ( 20 mg ), Oral, daily, x 90 day(s), # 180 cap(s), 3 Refill(s), Type: Physician Stop, Pharmacy: [facility], 2 cap(s) Oral daily,x90 day(s), 63, in, 06/10/19 10:45:00 CDT, Height Measured, Weight Measured, (Ordered)         fluticasone nasal, See Instructions, Instructions: INHALE 2 SPRAYS IN EACH NOSTRIL ONCE DAILY, # 16 unknown unit, 11 Refill(s), Type: Maintenance, Pharmacy: [facility], INHALE 2 SPRAYS IN EACH NOSTRIL ONCE DAILY, 63, in, 06/10/19 10:45:00 CDT, Height Measured, Weight..., (Ordered)         hydrochlorothiazide-triamterene, See Instructions, Instructions: 0.5 tab(s) Oral daily, # 45 EA, 3 Refill(s), Type: Maintenance, Pharmacy: [facility], 0.5 tab(s) Oral daily, 63, in, 06/10/19 10:45:00 CDT, Height Measured, Weight Measured, (Ordered)         simvastatin, See Instructions, Instructions: TAKE ONE  TABLET BY MOUTH AT BEDTIME, # 30 unknown unit, 0 Refill(s), Pharmacy: [facility], TAKE ONE TABLET BY MOUTH AT BEDTIME, 63, in, 06/10/19 10:45:00 CDT, Height Measured, Weight Measured, (Ordered)         Return to Clinic (Request), RFV: Annual Wellness and BP check, Return in 1-2 months         Return to Office (Request), RFV: Annual Lipids, BMP, XAVI. Hgb A1c q6 months  Patient Information     Name:TU CHAO      Address:      [address] [place] [id number]     Sex:Female     YOB: 1955     [phone]     Emergency Contact:SKIP CASTANEDA     MRN:[id number]     FIN:[id number]     Location:[facility]     Date of Service:07/13/2020      Primary Care Physician:       Mark Barrera MD, [phone]      Attending Physician:       Mark Barrera MD, [phone]  Problem List/Past Medical History    Ongoing     Backpain     Chronic Obstructive Pulmonary Disease (COPD)       Comments: Moderate severe COPD with FEV1 56% predicted     Dyslipidemia, goal LDL below 130       Comments: ATP3 10-year risk 6%     Eczema     Glucose intolerance (impaired glucose tolerance)     Headache     HTN, goal below 140/90     Mild major depression     Myofacial muscle pain     Nephrolithiasis     Obesity     Rhinitis, chronic     Seborrheic dermatitis     Tobacco user    Historical     Gall stone pancreatitis  Procedure/Surgical History     ERCP (04/16/2018)     Laparoscopic cholecystectomy (04/15/2018)     Appendectomy (04/04/1984)     Left Kidney Surgery (1972)  Medications    clotrimazole 1% topical cream, 1 edwige, Topical, bid, 1 refills    FLUoxetine 10 mg oral capsule, 20 mg= 2 cap(s), Oral, daily, 3 refills    fluticasone 50 mcg/inh nasal spray, See Instructions, 11 refills    hydrochlorothiazide-triamterene 25 mg-37.5 mg oral tablet, See Instructions, 3 refills    simvastatin 40 mg oral tablet, See Instructions    Ventolin HFA 90 mcg/inh inhalation aerosol, 2 puff(s),  Inhale, q4 hrs, PRN, 11 refills  Allergies    No Known Medication Allergies  Social History    Smoking Status - 07/13/2020     Former smoker     Alcohol      Current, 3 times per week, 1 drinks/episode average., 10/05/2018     Employment/School      Unemployed, 04/20/2012     Exercise      Exercise frequency: Never., 04/20/2012     Home/Environment      Marital status: ., 04/20/2012     Tobacco      Past, 03/07/2014  Family History    Arthritis: Mother.    COPD: Mother and Father.    Hypertension: Mother, Father, Sister and Brother.    Lung cancer..: Sister.    Obesity: Mother.    Sleep apnea syndrome: Mother and Father.    Uterine cancer: Mother.  Immunizations      Vaccine Date Status          tetanus/diphth/pertuss (Tdap) adult/adol 10/01/2018 Recorded              Comments : [10/3/2018] [place] [facility]          influenza virus vaccine, inactivated 09/28/2018 Given          ZOS, shingles 04/13/2018 Recorded              Comments : [4/13/2018] [facility] Recombinant Zoster (shingles) Vaccine          influenza virus vaccine, inactivated 09/18/2017 Given          ZOS, shingles 09/18/2017 Recorded          influenza virus vaccine, inactivated 09/22/2016 Given          pneumococcal (PPSV23) 02/11/2016 Given          influenza virus vaccine, inactivated 09/28/2015 Given          pneumococcal (PCV13) 12/09/2014 Given          influenza virus vaccine, inactivated 10/02/2014 Given          influenza virus vaccine, inactivated 12/17/2013 Given          influenza virus vaccine, inactivated 09/17/2012 Given          DTaP 05/29/2008 Recorded

## 2022-02-15 NOTE — LETTER
(Inserted Image. Unable to display)       July 11, 2019      TU [address] [place], [place] [id number]          Dear TU,      Thank you for selecting [facility] for your healthcare needs.     Our records indicate you are due for the following services:     Well Adult Exam  Hypertension Check ~ Please remember to bring any at-home blood pressure readings with you to your appointment.    To schedule an appointment or if you have further questions, please contact your primary clinic:   [facility]       [phone]   [facility]       [phone]              [facility]     [phone]    Powered by [employer] and [employer]    Sincerely,    Mark Barrera MD, FACP

## 2022-02-15 NOTE — PROGRESS NOTES
Chief Complaint    f/u labs  History of Present Illness      Patient is here for medical follow-up.  She feels like she has been under a great garage sale.  She does not recollect if she took her blood pressure medication today.  Review of Systems      No headache, chest pain, dyspnea, edema.  Walks her dog once daily.  Physical Exam   Vitals & Measurements    T: 97.7   F (Tympanic)  HR: 62(Peripheral)  BP: 144/80     HT: 63 in  WT: 198 lb  BMI: 35.07       Patient appears comfortable.  Alert and oriented.  Eyes appear normal.  H&T exam unremarkable.  Neck supple no.  Chest clear.  Heart exam is improved and is having no edema.  Assessment/Plan       Chronic Obstructive Pulmonary Disease (COPD) (J44.9)         Stable         Ordered:          97998 office outpatient visit 25 minutes (Charge), Quantity: 1, Glucose intolerance (impaired glucose tolerance)  Mild major depression  Chronic Obstructive Pulmonary Disease (COPD)  Dyslipidemia, goal LDL below 130  HTN, goal below 140/90  Rhinitis, chronic                Dyslipidemia, goal LDL below 130 (E78.5)         Controlled         Ordered:          71953 office outpatient visit 25 minutes (Charge), Quantity: 1, Glucose intolerance (impaired glucose tolerance)  Mild major depression  Chronic Obstructive Pulmonary Disease (COPD)  Dyslipidemia, goal LDL below 130  HTN, goal below 140/90  Rhinitis, chronic                Glucose intolerance (impaired glucose tolerance) (R73.02)         Stable. Encouraged weight loss and regular physical activity         Ordered:          08209 office outpatient visit 25 minutes (Charge), Quantity: 1, Glucose intolerance (impaired glucose tolerance)  Mild major depression  Chronic Obstructive Pulmonary Disease (COPD)  Dyslipidemia, goal LDL below 130  HTN, goal below 140/90  Rhinitis, chronic                HTN, goal below 140/90 (I10)         Above goal; Increase Maxzide to 1 tab and F/U in 1-2 months.         Ordered:           28824 office outpatient visit 25 minutes (Charge), Quantity: 1, Glucose intolerance (impaired glucose tolerance)  Mild major depression  Chronic Obstructive Pulmonary Disease (COPD)  Dyslipidemia, goal LDL below 130  HTN, goal below 140/90  Rhinitis, chronic                Mild major depression (F32.0)         Stable.         Ordered:          71925 office outpatient visit 25 minutes (Charge), Quantity: 1, Glucose intolerance (impaired glucose tolerance)  Mild major depression  Chronic Obstructive Pulmonary Disease (COPD)  Dyslipidemia, goal LDL below 130  HTN, goal below 140/90  Rhinitis, chronic                Rhinitis, chronic (J31.0)         Stable.         Ordered:          12214 office outpatient visit 25 minutes (Charge), Quantity: 1, Glucose intolerance (impaired glucose tolerance)  Mild major depression  Chronic Obstructive Pulmonary Disease (COPD)  Dyslipidemia, goal LDL below 130  HTN, goal below 140/90  Rhinitis, chronic                Orders:         albuterol, 2 puff(s), inh, q4 hrs, PRN: as needed for wheezing, # 2 EA, 11 Refill(s), Pharmacy: [facility], 2 puff(s) Inhale q4 hrs,PRN:as needed for wheezing, (Ordered)         FLUoxetine, = 1 cap(s) ( 20 mg ), PO, Daily, # 30 cap(s), 11 Refill(s), Type: Maintenance, Pharmacy: [facility], 1 cap(s) Oral daily, (Ordered)         fluticasone nasal, = 2 spray(s), nasal, daily, # 1 EA, 11 Refill(s), Pharmacy: [facility], 2 spray(s) Nasal daily, (Ordered)         hydrochlorothiazide-triamterene, 1 tab(s), Oral, daily, # 30 tab(s), 11 Refill(s), Type: Maintenance, Pharmacy: [facility], 1 tab(s) Oral daily,x30 day(s), (Ordered)         simvastatin, = 1 tab(s) ( 40 mg ), PO, Once a day (at bedtime), # 30 tab(s), 11 Refill(s), Type: Maintenance, Pharmacy: [facility], 1 tab(s) Oral hs, (Ordered)         Return to Clinic (Request), RFV: Glucose, Hgb A1c, Return in 6 months         Return to Clinic (Request), RFV: Annual Wellness and BP  check, Return in 1-2 months         Return to Office (Request), RFV: Annual Lipids, BMP, XAVI. Hgb A1c q6 months  Patient Information     Name:TU CHAO      Address:      [address] [place] [address]     Sex:Female     YOB: 1955     [phone]     Emergency Contact:SKIP CASTANEDA     MRN:[id number]     FIN:[id number]     Location[facility]     Date of Service:06/10/2019      Primary Care Physician:       Mark Barrera MD, [phone]      Attending Physician:       Mark Barrera MD, [phone]  Problem List/Past Medical History    Ongoing     Backpain     Chronic Obstructive Pulmonary Disease (COPD)       Comments: Moderate severe COPD with FEV1 56% predicted     Dyslipidemia, goal LDL below 130       Comments: ATP3 10-year risk 6%     Eczema     Glucose intolerance (impaired glucose tolerance)     Headache     HTN, goal below 140/90     Mild major depression     Myofacial muscle pain     Nephrolithiasis     Obesity     Rhinitis, chronic     Seborrheic dermatitis     Tobacco user    Historical     Gall stone pancreatitis  Procedure/Surgical History     ERCP (04/16/2018)     Laparoscopic cholecystectomy (04/15/2018)     Appendectomy (04/04/1984)     Left Kidney Surgery (1972)  Medications        Ventolin HFA 90 mcg/inh inhalation aerosol: 2 puff(s), inh, q4 hrs, PRN: as needed for wheezing, 2 EA, 11 Refill(s).        FLUoxetine 20 mg oral capsule: 20 mg, 1 cap(s), PO, Daily, 30 cap(s), 11 Refill(s).        Flonase 50 mcg/inh nasal spray: 2 spray(s), nasal, daily, 1 EA, 11 Refill(s).        Maxzide-25 oral tablet: 1 tab(s), Oral, daily, for 30 day(s), 30 tab(s), 11 Refill(s).        simvastatin 40 mg oral tablet: 40 mg, 1 tab(s), PO, Once a day (at bedtime), 30 tab(s), 11 Refill(s).  Allergies    No Known Medication Allergies  Social History    Smoking Status - 06/10/2019     Former smoker     Alcohol      Current, 3 times per week, 1  drinks/episode average., 10/05/2018     Employment and Education      Unemployed, 04/20/2012     Exercise and Physical Activity      Exercise frequency: Never., 04/20/2012     Home and Environment      Marital status: ., 04/20/2012     Tobacco      Past, 03/07/2014  Family History    Arthritis: Mother.    COPD: Mother and Father.    Hypertension: Mother, Father, Sister and Brother.    Lung cancer..: Sister.    Obesity: Mother.    Sleep apnea syndrome: Mother and Father.    Uterine cancer: Mother.  Immunizations      Vaccine Date Status Comments      tetanus/diphth/pertuss (Tdap) adult/adol 10/01/2018 Recorded [10/3/2018] Received at [facility] [place], [place]      influenza virus vaccine, inactivated 09/28/2018 Given      ZOS, shingles 04/13/2018 Recorded [4/13/2018] [facility] Recombinant Zoster (shingles) Vaccine      influenza virus vaccine, inactivated 09/18/2017 Given      ZOS, shingles 09/18/2017 Recorded      influenza virus vaccine, inactivated 09/22/2016 Given      pneumococcal (PPSV23) 02/11/2016 Given      influenza virus vaccine, inactivated 09/28/2015 Given      pneumococcal (PCV13) 12/09/2014 Given      influenza virus vaccine, inactivated 10/02/2014 Given      influenza virus vaccine, inactivated 12/17/2013 Given      influenza virus vaccine, inactivated 09/17/2012 Given      DTaP 05/29/2008 Recorded  Lab Results          Lab Results (Last 4 results within 90 days)          Glucose Level: 96 mg/dL [65 mg/dL - 139 mg/dL] (05/28/19 13:37:00)          Hgb A1c: 6.2 High (05/28/19 13:37:00)          U Microalbumin: 0.8 mg/dL (05/28/19 13:37:00)          Ur Creatinine: 94 mg/dL [20 mg/dL - 275 mg/dL] (05/28/19 13:37:00)          Ur Microalbumin/Creatinine Ratio: 9 (05/28/19 13:37:00)

## 2022-02-15 NOTE — TELEPHONE ENCOUNTER
Entered by Rut Bobby MA on August 27, 2020 9:41:57 AM CDT  ---------------------  From: Rut Bobby MA   To: [facility]    Sent: 8/27/2020 9:41:57 AM CDT  Subject: Medication Management     ** Not Approved: Patient has requested refill too soon, was refilled 7/13/2020 x1yr by [facility] **  fluticasone nasal (FLUTICASONE 50 MCG NASAL SPR INHALANT)  INHALE 2 SPRAYS IN EACH NOSTRIL ONCE DAILY  Qty:  16 unknown unit        Days Supply:  0        Refills:  0          Substitutions Allowed     Route To Pharmacy - [facility]   Signed by Rut Bobby MA            ** Patient matched by Rut Bobby MA on 8/27/2020 9:39:56 AM CDT **      ------------------------------------------  From: Chevia  To: Mark Barrera MD  Sent: August 27, 2020 8:52:05 AM CDT  Subject: Medication Management  Due: August 12, 2020 4:14:54 PM CDT     ** On Hold Pending Signature **     Dispensed Drug: fluticasone nasal (fluticasone 50 mcg/inh nasal spray), INHALE 2 SPRAYS IN EACH NOSTRIL ONCE DAILY  Quantity: 16 unknown unit  Days Supply: 0  Refills: 0  Substitutions Allowed  Notes from Pharmacy:  ------------------------------------------

## 2022-02-15 NOTE — TELEPHONE ENCOUNTER
Entered by Cecy Larry CMA on June 19, 2020 3:58:40 PM CDT  ---------------------  From: Cecy Larry CMA   To: [facility]    Sent: 6/19/2020 3:58:40 PM CDT  Subject: Medication Management     ** Submitted: **  Order:fluticasone nasal (fluticasone 50 mcg/inh nasal spray)  See Instructions  INHALE 2 SPRAYS IN EACH NOSTRIL ONCE DAILY  Qty:  16 unknown unit        Days Supply:  0        Refills:  0          Substitutions Allowed     Route To Pharmacy - [facility]    Signed by Cecy Larry CMA  6/19/2020 8:58:00 PM    ** Submitted: **  Complete:fluticasone nasal (Flonase 50 mcg/inh nasal spray)   Signed by Cecy Larry CMA  6/19/2020 8:58:00 PM    ** Not Approved:  **  fluticasone nasal (FLUTICASONE 50 MCG NASAL SPR INHALANT)  INHALE 2 SPRAYS IN EACH NOSTRIL ONCE DAILY  Qty:  16 unknown unit        Refills:  0          Substitutions Allowed     Details:  16 unknown unit, INHALE 2 SPRAYS IN EACH NOSTRIL ONCE DAILY, Route to Pharmacy Electronically [facility], 6/10/2019, 5/26/2020, 0, Mark Barrera MD      Route To Pharmacy - [facility]   Signed by Cecy Larry CMA            ** Submitted: **  Order:hydrochlorothiazide-triamterene (hydrochlorothiazide-triamterene 25 mg-37.5 mg oral tablet)  1 tab(s)  Oral  daily  Qty:  30 tab(s)        Refills:  0          Substitutions Allowed     Route To Pharmacy - [facility]    Signed by Cecy Larry CMA  6/19/2020 8:57:00 PM    ** Submitted: **  Complete:hydrochlorothiazide-triamterene (Maxzide-25 oral tablet)   Signed by Cecy Larry CMA  6/19/2020 8:58:00 PM    ** Not Approved:  **  hydrochlorothiazide-triamterene (TRIAMTERENE/HCTZ 37.5MG/25MG TAB TABLET)  TAKE 1 TABLET BY MOUTH ONCE DAILY  Qty:  30 unknown unit        Refills:  0          Substitutions Allowed     Details:  30 unknown unit, TAKE 1 TABLET BY MOUTH ONCE DAILY, Route to Pharmacy Electronically [facility], 6/10/2019, 3/11/2020, 0, Holly PINON, Mark      Route To Pharmacy - [facility]   Signed  by Cecy Larry CMA          due for a visit  contacted pt at 1555, agrees to set up appt and transferred to scheduling      ** Patient matched by Cecy Larry CMA on 6/19/2020 3:53:46 PM CDT **      ------------------------------------------  From: [facility]  To: Mark Barrera MD  Sent: June 19, 2020 9:25:44 AM CDT  Subject: Medication Management  Due: June 17, 2020 4:18:41 PM CDT     ** On Hold Pending Signature **     Dispensed Drug: fluticasone nasal (fluticasone 50 mcg/inh nasal spray), INHALE 2 SPRAYS IN EACH NOSTRIL ONCE DAILY  Quantity: 16 unknown unit  Days Supply: 0  Refills: 0  Substitutions Allowed  Notes from Pharmacy:     ** On Hold Pending Signature **     Dispensed Drug: hydrochlorothiazide-triamterene (hydrochlorothiazide-triamterene 25 mg-37.5 mg oral tablet), TAKE 1 TABLET BY MOUTH ONCE DAILY  Quantity: 30 unknown unit  Days Supply: 0  Refills: 0  Substitutions Allowed  Notes from Pharmacy:  ------------------------------------------

## 2022-02-15 NOTE — PROGRESS NOTES
Patient:   TU CHAO            MRN: [id number]            FIN: [id number]               Age:   63 years     Sex:  Female     :  1955   Associated Diagnoses:   Chronic Obstructive Pulmonary Disease (COPD); Dyslipidemia, goal LDL below 130; Glucose intolerance (impaired glucose tolerance); HTN, goal below 140/90; Mild Major Depression; Rhinitis, chronic   Author:   Mark Barrera MD      Visit Information   Visit type:  Scheduled follow-up.    Accompanied by:  No one.    Source of history:  Self.    History limitation:  None.       Chief Complaint   2018 9:26 AM CDT    med refills      History of Present Illness   Patient is here for follow-up.  She has had a good summer.  She was busy helping to take care of the estates of her parents, helping a brother move.   and winter tend to be worse times for her depression.  Her PHQ9 score is 12; however, she has no suicidal ideation and does not wish to increase her medication which I did recommend.  Discussed having a wellness visit.  She quit Anoro Ellipta as she did not really feel like it helped much.  Uses albuterol on occasion.  No longer smokes.  She does get relief with albuterol when she uses it.  Discussed immunizations.  Discussed the need for weight loss and I recommended the DASH diet.                 Review of Systems   Constitutional:  Negative.    Eye:  Negative.    Respiratory:  No cough, No sputum production, No hemoptysis, No wheezing.    Cardiovascular:  No chest pain, No peripheral edema.    Gastrointestinal:  No nausea, No vomiting, No diarrhea, No abdominal pain.    Genitourinary:  Negative.    Endocrine:  Negative.    Neurologic:  Negative.    Psychiatric:  Negative except as documented in history of present illness.       Health Status   Allergies:    Allergic Reactions (Selected)  No Known Medication Allergies   Medications:  (Selected)   Prescriptions  Prescribed  FLUoxetine 20 mg oral capsule: = 1 cap(s) ( 20 mg ), PO, Daily, #  30 cap(s), 11 Refill(s), Type: Maintenance, Pharmacy: [facility], 1 cap(s) Oral daily  Flonase 50 mcg/inh nasal spray: = 2 spray(s), nasal, daily, # 1 EA, 11 Refill(s), Pharmacy: [facility], 2 spray(s) Nasal daily  Maxzide-25 oral tablet: See Instructions, Instructions: 0.5 tab(s) po daily, # 30 EA, 11 Refill(s), Type: Maintenance, Pharmacy: [facility], 0.5 tab(s) po daily  Ventolin HFA 90 mcg/inh inhalation aerosol: 2 puff(s), inh, q4 hrs, PRN: as needed for wheezing, # 1 EA, 11 Refill(s), Pharmacy: [facility], 2 puff(s) Inhale q4 hrs,PRN:as needed for wheezing  simvastatin 40 mg oral tablet: = 1 tab(s) ( 40 mg ), PO, Once a day (at bedtime), # 30 tab(s), 11 Refill(s), Type: Maintenance, Pharmacy: [facility], 1 tab(s) Oral hs   Problem list:    All Problems  Backpain / ICD-9-.5 / Confirmed  Chronic Obstructive Pulmonary Disease (COPD) / SNOMED CT 61926560 / Confirmed  Dyslipidemia, goal LDL below 130 / ICD-9-.4 / Confirmed  Eczema / ICD-9-.9 / Confirmed  Glucose intolerance (impaired glucose tolerance) / SNOMED CT 56234739 / Confirmed  HEADACHE / ICD-9-.0 / Confirmed  HTN, goal below 140/90 / SNOMED CT 2056318186 / Confirmed  Mild Major Depression / ICD-9-.21 / Confirmed  Myofacial muscle pain / ICD-9-.1 / Confirmed  Nephrolithiasis / SNOMED CT 674737264 / Confirmed  Obesity / ICD-9-.00 / Probable  Rhinitis, chronic / ICD-9-.0 / Confirmed  Seborrheic Dermatitis / ICD-9-.10 / Confirmed  Inactive: Tobacco user / ICD-9-.1  Resolved: Gall stone pancreatitis / SNOMED CT 381078817      Histories   Past Medical History:    Active  Obesity (278.00)  Eczema (692.9)  Seborrheic Dermatitis (690.10)  HEADACHE (784.0)  Backpain (724.5)  Resolved  Gall stone pancreatitis (904416775):  Resolved.   Family History:    Sleep apnea syndrome  Mother  Father  Hypertension  Mother  Father  Sister (Oralia, )  Brother  (slava)  COPD  Father  Mother  Arthritis  Mother  Uterine cancer  Mother  Lung cancer..  Sister (Oralia, )  Obesity  Mother     Procedure history:    ERCP ([id number]) on 2018 at 62 Years.  Laparoscopic cholecystectomy ([id number]) on 4/15/2018 at 62 Years.  Appendectomy ([id number]) on 1984 at 28 Years.  Left Kidney Surgery in  at 17 Years.   Social History:        Alcohol Assessment            Past      Tobacco Assessment: Past            Past                     Comments:                      2014 - Holly PINON, Mark Gallo       Employment and Education Assessment            Unemployed      Home and Environment Assessment            Marital status: .      Exercise and Physical Activity Assessment            Exercise frequency: Never.        Physical Examination   Vital Signs   2018 9:26 AM CDT Peripheral Pulse Rate 76 bpm    Systolic Blood Pressure 129 mmHg    Diastolic Blood Pressure 88 mmHg  HI    Mean Arterial Pressure 102 mmHg    BP Site Right arm      Measurements from flowsheet : Measurements   2018 9:26 AM CDT Height Measured - Standard 63 in    Weight Measured - Standard 200 lb    BSA 2.01 m2    Body Mass Index 35.42 kg/m2  HI      General:  Alert and oriented, No acute distress.    Eye:  Normal conjunctiva.    HENT:  Normocephalic, Normal hearing.    Neck:  Supple, Non-tender, No carotid bruit, No lymphadenopathy, No thyromegaly, Trigger points bilaterally.    Respiratory:  Lungs are clear to auscultation, Respirations are non-labored.    Cardiovascular:  Normal rate, Regular rhythm, No murmur, No gallop, Normal peripheral perfusion, No edema.    Musculoskeletal:  Normal gait.    Neurologic:  No focal deficits.    Cognition and Speech:  Speech clear and coherent, Functional cognition intact.    Psychiatric:  Cooperative, Appropriate mood & affect, Non-suicidal.       Review / Management   Results review   Radiology results   X-ray, PA and  lat chest , Reported at  2/4/2014 2:02:00 PM, Reviewed radiologist's report, Interpretation:  Negative chest   ECG interpretation:  Time  2/3/2014 2:41:00 PM, Rate  68  beats per minute, Within normal limits.    PFT 2/3/2014: Moderately severe obstruction      Impression and Plan   Diagnosis     Chronic Obstructive Pulmonary Disease (COPD) (KKV09-ZI J44.9).     Dyslipidemia, goal LDL below 130 (HTZ86-YK E78.5).     Glucose intolerance (impaired glucose tolerance) (SNB16-GX R73.02).     HTN, goal below 140/90 (PFH23-TS I10).     Mild Major Depression (URE33-IH F32.0).     Rhinitis, chronic (AHV69-YK J31.0).     Course:  Unchanged.    Patient Instructions:       Counseled: Patient, Regarding medications, Diet, Verbalized understanding, Weight loss and regular physical activity.    Orders     Orders (Selected)   Outpatient Orders  Ordered  Return to Clinic (Request): RFV: Annual Wellness, Return in 3-6 months  Return to Office (Request): RFV: Annual Lipids, BMP, XAVI. Hgb A1c q6 months  Ordered (Dispatched)  Basic Metabolic Panel* ([facility]): Specimen Type: Serum, Collection Date: 09/28/18 9:47:00 CDT  Lipid panel with reflex to direct ldl* ([facility]): Specimen Type: Serum, Collection Date: 09/28/18 9:47:00 CDT  Prescriptions  Prescribed  FLUoxetine 20 mg oral capsule: = 1 cap(s) ( 20 mg ), PO, Daily, # 30 cap(s), 11 Refill(s), Type: Maintenance, Pharmacy: [facility], 1 cap(s) Oral daily  Flonase 50 mcg/inh nasal spray: = 2 spray(s), nasal, daily, # 1 EA, 11 Refill(s), Pharmacy: [facility], 2 spray(s) Nasal daily  Maxzide-25 oral tablet: See Instructions, Instructions: 0.5 tab(s) po daily, # 30 EA, 11 Refill(s), Type: Maintenance, Pharmacy: [facility], 0.5 tab(s) po daily  Ventolin HFA 90 mcg/inh inhalation aerosol: 2 puff(s), inh, q4 hrs, PRN: as needed for wheezing, # 1 EA, 11 Refill(s), Pharmacy: [facility], 2 puff(s) Inhale q4 hrs,PRN:as needed for wheezing  simvastatin 40 mg oral tablet: = 1 tab(s) ( 40 mg ), PO,  Once a day (at bedtime), # 30 tab(s), 11 Refill(s), Type: Maintenance, Pharmacy: [facility], 1 tab(s) Oral hs.

## 2022-02-15 NOTE — NURSING NOTE
Comprehensive Intake Entered On:  12/28/2021 12:00 PM CST    Performed On:  12/28/2021 11:49 AM CST by Lizett Parikh LPN               Summary   Chief Complaint :   right ankle pain for the past week. swollen at the end of day.    Advance Directive :   No   Weight Measured :   185 lb(Converted to: 185 lb 0 oz, 83.915 kg)    Height Measured :   63 in(Converted to: 5 ft 3 in, 160.02 cm)    Body Mass Index :   32.77 kg/m2 (HI)    Body Surface Area :   1.93 m2   Systolic Blood Pressure :   132 mmHg (HI)    Diastolic Blood Pressure :   74 mmHg   Mean Arterial Pressure :   93 mmHg   Peripheral Pulse Rate :   80 bpm   BP Site :   Right arm   Pulse Site :   Radial artery   BP Method :   Manual   HR Method :   Manual   Temperature Tympanic :   97.3 DegF(Converted to: 36.3 DegC)  (LOW)    Lizett Parikh LPN - 12/28/2021 11:49 AM CST   Health Status   Allergies Verified? :   Yes   Medication History Verified? :   Yes   Pre-Visit Planning Status :   Completed   Tobacco Use? :   Former smoker   Lizett Parikh LPN - 12/28/2021 11:49 AM CST   Consents   Consent for Immunization Exchange :   Consent Granted   Consent for Immunizations to Providers :   Consent Granted   Lizett Parikh LPN - 12/28/2021 11:49 AM CST   Meds / Allergies   (As Of: 12/28/2021 12:00:14 PM CST)   Allergies (Active)   No Known Medication Allergies  Estimated Onset Date:   Unspecified ; Created By:   Etelvina Galan MA; Reaction Status:   Active ; Category:   Drug ; Substance:   No Known Medication Allergies ; Type:   Allergy ; Updated By:   Etelvina Galan MA; Reviewed Date:   12/28/2021 11:49 AM CST        Medication List   (As Of: 12/28/2021 12:00:14 PM CST)   Prescription/Discharge Order    albuterol  :   albuterol ; Status:   Prescribed ; Ordered As Mnemonic:   Ventolin HFA 90 mcg/inh inhalation aerosol ; Simple Display Line:   2 puff(s), inh, q4 hrs, PRN: as needed for wheezing, 2 EA, 11 Refill(s) ; Ordering Provider:   Mark Barrera MD;  Catalog Code:   albuterol ; Order Dt/Tm:   7/13/2021 9:36:04 AM CDT          FLUoxetine  :   FLUoxetine ; Status:   Prescribed ; Ordered As Mnemonic:   FLUoxetine 10 mg oral capsule ; Simple Display Line:   10 mg, 1 cap(s), Oral, bid, for 90 day(s), 180 cap(s), 3 Refill(s) ; Ordering Provider:   Mark Barrera MD; Catalog Code:   FLUoxetine ; Order Dt/Tm:   7/13/2021 9:37:06 AM CDT          fluticasone nasal  :   fluticasone nasal ; Status:   Prescribed ; Ordered As Mnemonic:   fluticasone 50 mcg/inh nasal spray ; Simple Display Line:   See Instructions, INHALE 2 SPRAYS IN EACH NOSTRIL ONCE DAILY, 16 unknown unit, 11 Refill(s) ; Ordering Provider:   Mark Barrera MD; Catalog Code:   fluticasone nasal ; Order Dt/Tm:   7/13/2021 9:36:07 AM CDT          hydrochlorothiazide-triamterene  :   hydrochlorothiazide-triamterene ; Status:   Prescribed ; Ordered As Mnemonic:   hydrochlorothiazide-triamterene 25 mg-37.5 mg oral tablet ; Simple Display Line:   See Instructions, 0.5 tab(s) Oral daily, 45 EA, 3 Refill(s) ; Ordering Provider:   Mark Barrera MD; Catalog Code:   hydrochlorothiazide-triamterene ; Order Dt/Tm:   7/13/2021 9:35:59 AM CDT          simvastatin  :   simvastatin ; Status:   Prescribed ; Ordered As Mnemonic:   simvastatin 40 mg oral tablet ; Simple Display Line:   40 mg, 1 tab(s), Oral, hs, 90 tab(s), 3 Refill(s) ; Ordering Provider:   Mark Barrera MD; Catalog Code:   simvastatin ; Order Dt/Tm:   7/13/2021 9:35:53 AM CDT            Home Meds    ascorbic acid  :   ascorbic acid ; Status:   Documented ; Ordered As Mnemonic:   Vitamin C 500 mg oral tablet ; Simple Display Line:   500 mg, 1 tab(s), Oral, daily, 0 Refill(s) ; Catalog Code:   ascorbic acid ; Order Dt/Tm:   4/29/2021 12:09:31 PM CDT          chondroitin-glucosamine  :   chondroitin-glucosamine ; Status:   Documented ; Ordered As Mnemonic:   Osteo Bi-Flex Edge [employer] ; Simple Display Line:   1 tab, Oral, daily, 0 Refill(s) ; Catalog  Code:   chondroitin-glucosamine ; Order Dt/Tm:   4/29/2021 12:11:15 PM CDT          multivitamin with minerals  :   multivitamin with minerals ; Status:   Documented ; Ordered As Mnemonic:   Vitamin D with Minerals oral tablet ; Simple Display Line:   1 tab(s), Oral, daily, 0 Refill(s) ; Catalog Code:   multivitamin with minerals ; Order Dt/Tm:   4/29/2021 12:09:44 PM CDT          omega-3 polyunsaturated fatty acids  :   omega-3 polyunsaturated fatty acids ; Status:   Documented ; Ordered As Mnemonic:   Fish Oil ; Simple Display Line:   1 tab, Oral, daily, 0 Refill(s) ; Catalog Code:   omega-3 polyunsaturated fatty acids ; Order Dt/Tm:   4/29/2021 12:09:11 PM CDT          pyridoxine  :   pyridoxine ; Status:   Documented ; Ordered As Mnemonic:   Vitamin B6 ; Simple Display Line:   1 tab, Oral, daily, 0 Refill(s) ; Catalog Code:   pyridoxine ; Order Dt/Tm:   4/29/2021 12:10:21 PM CDT          turmeric  :   turmeric ; Status:   Documented ; Ordered As Mnemonic:   turmeric 500 mg oral capsule ; Simple Display Line:   500 mg, 1 cap(s), Oral, daily, 0 Refill(s) ; Catalog Code:   turmeric ; Order Dt/Tm:   4/29/2021 12:09:23 PM CDT          vitamin E  :   vitamin E ; Status:   Documented ; Ordered As Mnemonic:   vitamin E ; Simple Display Line:   1 tab, Oral, daily, 0 Refill(s) ; Catalog Code:   vitamin E ; Order Dt/Tm:   4/29/2021 12:10:05 PM CDT            Social History   Social History   (As Of: 12/28/2021 12:00:14 PM CST)   Alcohol:        Current, Wine (5 oz), 1-2 times per week, 1 drinks/episode average.  2 drinks/episode maximum.  Ready to change: No.   (Last Updated: 10/5/2020 9:52:27 AM CDT by Loretta Xiao)          Tobacco:        Former smoker, quit more than 30 days ago, Cigarettes, 20 per day.  40 year(s).  Household tobacco concerns: No.   Comments:  10/5/2020 9:52 AM - Loretta Xiao: Quit in 2013   (Last Updated: 12/28/2021 11:49:32 AM CST by Lizett Parikh LPN)          Electronic  Cigarette/Vaping:        Electronic Cigarette Use: Never.   (Last Updated: 4/29/2021 12:07:42 PM CDT by Charlee Abarca CMA)          Employment/School:        Unemployed   (Last Updated: 4/20/2012 1:33:34 PM CDT by Mark Barrera MD)          Home/Environment:        Marital status: .  Living situation: Home/Independent.  Smoker in household: No.  Injuries/Abuse/Neglect in household: No.  Feels unsafe at home: No.  Family/Friends available for support: Yes.  Risks in environment: Stairs.   (Last Updated: 10/5/2020 9:54:40 AM CDT by Loretta Xiao)          Nutrition/Health:        Type of diet: Regular.  Wants to lose weight: Yes.  Feels highly stressed: No.   (Last Updated: 10/5/2020 9:53:33 AM CDT by Loretta Xiao)          Exercise:  Occasional exercise      Exercise frequency: Occasional.   (Last Updated: 10/5/2020 9:54:07 AM CDT by Loretta Xiao)          Sexual:        Sexually active: No.  Identifies as female, Sexual orientation: Straight or heterosexual.  History of STD: No.  Contraceptive Use Details: Abstinence.  History of sexual abuse: No.   (Last Updated: 10/5/2020 9:55:06 AM CDT by Loretta Xiao)

## 2022-02-15 NOTE — NURSING NOTE
Comprehensive Intake Entered On:  7/13/2021 9:17 AM CDT    Performed On:  7/13/2021 9:14 AM CDT by Loni Lam               Summary   Chief Complaint :   1. Medication refill 2. medical f/u    Advance Directive :   No   Weight Measured :   182.0 lb(Converted to: 182 lb 0 oz, 82.554 kg)    Systolic Blood Pressure :   123 mmHg   Diastolic Blood Pressure :   80 mmHg   Mean Arterial Pressure :   94 mmHg   Peripheral Pulse Rate :   71 bpm   BP Site :   Right arm   BP Method :   Electronic   HR Method :   Electronic   Temperature Tympanic :   98.1 DegF(Converted to: 36.7 DegC)    Loni Lam - 7/13/2021 9:14 AM CDT   Health Status   Allergies Verified? :   Yes   Medication History Verified? :   Yes   Medical History Verified? :   Yes   Pre-Visit Planning Status :   Completed   Tobacco Use? :   Former smoker   Loni Lam - 7/13/2021 9:14 AM CDT   Consents   Consent for Immunization Exchange :   Consent Granted   Consent for Immunizations to Providers :   Consent Granted   Loni Lam - 7/13/2021 9:14 AM CDT   Meds / Allergies   (As Of: 7/13/2021 9:17:59 AM CDT)   Allergies (Active)   No Known Medication Allergies  Estimated Onset Date:   Unspecified ; Created By:   Etelvina Galan MA; Reaction Status:   Active ; Category:   Drug ; Substance:   No Known Medication Allergies ; Type:   Allergy ; Updated By:   Etelvina Galan MA; Reviewed Date:   7/13/2021 9:16 AM CDT        Medication List   (As Of: 7/13/2021 9:17:59 AM CDT)   Prescription/Discharge Order    simvastatin  :   simvastatin ; Status:   Prescribed ; Ordered As Mnemonic:   simvastatin 40 mg oral tablet ; Simple Display Line:   40 mg, 1 tab(s), Oral, hs, 90 tab(s), 3 Refill(s) ; Ordering Provider:   Mark Barrera MD; Catalog Code:   simvastatin ; Order Dt/Tm:   9/1/2020 10:25:45 AM CDT          hydrochlorothiazide-triamterene  :   hydrochlorothiazide-triamterene ; Status:   Prescribed ; Ordered As Mnemonic:   hydrochlorothiazide-triamterene  25 mg-37.5 mg oral tablet ; Simple Display Line:   See Instructions, 0.5 tab(s) Oral daily, 45 EA, 3 Refill(s) ; Ordering Provider:   Mark Barrera MD; Catalog Code:   hydrochlorothiazide-triamterene ; Order Dt/Tm:   7/13/2020 2:53:49 PM CDT          fluticasone nasal  :   fluticasone nasal ; Status:   Prescribed ; Ordered As Mnemonic:   fluticasone 50 mcg/inh nasal spray ; Simple Display Line:   See Instructions, INHALE 2 SPRAYS IN EACH NOSTRIL ONCE DAILY, 16 unknown unit, 11 Refill(s) ; Ordering Provider:   Mark Barrera MD; Catalog Code:   fluticasone nasal ; Order Dt/Tm:   9/1/2020 11:54:38 AM CDT          albuterol  :   albuterol ; Status:   Prescribed ; Ordered As Mnemonic:   Ventolin HFA 90 mcg/inh inhalation aerosol ; Simple Display Line:   2 puff(s), inh, q4 hrs, PRN: as needed for wheezing, 2 EA, 11 Refill(s) ; Ordering Provider:   Mark Barrera MD; Catalog Code:   albuterol ; Order Dt/Tm:   7/13/2020 2:56:24 PM CDT            Home Meds    vitamin E  :   vitamin E ; Status:   Documented ; Ordered As Mnemonic:   vitamin E ; Simple Display Line:   1 tab, Oral, daily, 0 Refill(s) ; Catalog Code:   vitamin E ; Order Dt/Tm:   4/29/2021 12:10:05 PM CDT          turmeric  :   turmeric ; Status:   Documented ; Ordered As Mnemonic:   turmeric 500 mg oral capsule ; Simple Display Line:   500 mg, 1 cap(s), Oral, daily, 0 Refill(s) ; Catalog Code:   turmeric ; Order Dt/Tm:   4/29/2021 12:09:23 PM CDT          pyridoxine  :   pyridoxine ; Status:   Documented ; Ordered As Mnemonic:   Vitamin B6 ; Simple Display Line:   1 tab, Oral, daily, 0 Refill(s) ; Catalog Code:   pyridoxine ; Order Dt/Tm:   4/29/2021 12:10:21 PM CDT          omega-3 polyunsaturated fatty acids  :   omega-3 polyunsaturated fatty acids ; Status:   Documented ; Ordered As Mnemonic:   Fish Oil ; Simple Display Line:   1 tab, Oral, daily, 0 Refill(s) ; Catalog Code:   omega-3 polyunsaturated fatty acids ; Order Dt/Tm:   4/29/2021 12:09:11 PM  CDT          multivitamin with minerals  :   multivitamin with minerals ; Status:   Documented ; Ordered As Mnemonic:   Vitamin D with Minerals oral tablet ; Simple Display Line:   1 tab(s), Oral, daily, 0 Refill(s) ; Catalog Code:   multivitamin with minerals ; Order Dt/Tm:   4/29/2021 12:09:44 PM CDT          chondroitin-glucosamine  :   chondroitin-glucosamine ; Status:   Documented ; Ordered As Mnemonic:   Osteo [employer] ; Simple Display Line:   1 tab, Oral, daily, 0 Refill(s) ; Catalog Code:   chondroitin-glucosamine ; Order Dt/Tm:   4/29/2021 12:11:15 PM CDT          ascorbic acid  :   ascorbic acid ; Status:   Documented ; Ordered As Mnemonic:   Vitamin C 500 mg oral tablet ; Simple Display Line:   500 mg, 1 tab(s), Oral, daily, 0 Refill(s) ; Catalog Code:   ascorbic acid ; Order Dt/Tm:   4/29/2021 12:09:31 PM CDT

## 2022-02-15 NOTE — NURSING NOTE
Quick Intake Entered On:  6/17/2021 1:16 PM CDT    Performed On:  6/17/2021 1:16 PM CDT by Mark Barrera MD               Summary   Advance Directive :   No   Systolic Blood Pressure :   128 mmHg   Diastolic Blood Pressure :   70 mmHg   Mean Arterial Pressure :   89 mmHg   BP Site :   Right arm   BP Method :   Manual   Mark Barrera MD - 6/17/2021 1:16 PM CDT

## 2022-02-15 NOTE — PROGRESS NOTES
Patient:   TU CHAO            MRN: [id number]            FIN: [id number]               Age:   62 years     Sex:  Female     :  1955   Associated Diagnoses:   Obesity; Mild Major Depression; Glucose intolerance (impaired glucose tolerance); HTN, goal below 140/90; Dyslipidemia, goal LDL below 130; Chronic Obstructive Pulmonary Disease (COPD); FH: hearing loss; Asymmetrical right sensorineural hearing loss   Author:   Mark Barrera MD      Visit Information   Visit type:  Scheduled follow-up.    Accompanied by:  No one.    Source of history:  Self.    History limitation:  None.       Chief Complaint   2017 11:46 AM CDT   awv      History of Present Illness   This patient is here for refills.    She has COPD.  She no longer smokes.  She gets a little breathless with activity.  We discussed adding a control medication.  She is willing to try that.    Her father  and there are some issues with the will and she is feeling a little more down and depressed.  She does tend to have more problems in the fall and winter.   We discussed increasing the Fluoxetine; she would like to do that.    She is frustrated by her obesity but is quite inactive and doesn t watch her calorie intake.  She still drinks Coca-Cola. She walks her dog at a very slow pace about 15 minutes twice a day.              Review of Systems   Constitutional:  Negative.    Eye:  Negative.    Respiratory:  No cough, No sputum production, No hemoptysis, No wheezing.    Cardiovascular:  No chest pain, No peripheral edema.    Gastrointestinal:  No nausea, No vomiting, No diarrhea, No abdominal pain.    Genitourinary:  Negative.    Endocrine:  Negative.    Neurologic:  Negative.    Psychiatric:  Anxiety, Depression, Not suicidal.       Health Status   Allergies:    Allergic Reactions (Selected)  No known allergies   Medications:  (Selected)   Prescriptions  Prescribed  Anoro Ellipta 62.5 mcg-25 mcg/inh inhalation powder: 1 puff(s), inh, daily,  # 30 EA, 11 Refill(s), Type: Maintenance, Pharmacy: [facility], 1 puff(s) inh daily  FLUoxetine 20 mg oral capsule: 1 cap(s) ( 20 mg ), PO, Daily, # 30 cap(s), 11 Refill(s), Type: Maintenance, Pharmacy: [facility], 1 cap(s) po daily  Flonase 50 mcg/inh nasal spray: 2 spray(s), nasal, daily, # 1 EA, 11 Refill(s), Pharmacy: [facility], 2 spray(s) nasal daily  Maxzide-25 oral tablet: See Instructions, Instructions: 0.5 tab(s) po daily, # 30 EA, 11 Refill(s), Type: Maintenance, Pharmacy: [facility], 0.5 tab(s) po daily  Ventolin HFA 90 mcg/inh inhalation aerosol: 2 puff(s), inh, q4 hrs, PRN: as needed for wheezing, # 1 EA, 11 Refill(s), Pharmacy: [facility], 2 puff(s) inh q4 hrs,PRN:as needed for wheezing  simvastatin 40 mg oral tablet: 1 tab(s) ( 40 mg ), PO, Once a day (at bedtime), # 30 tab(s), 11 Refill(s), Type: Maintenance, Pharmacy: [facility], 1 tab(s) po hs   Problem list:    All Problems  Backpain / ICD-9-.5 / Confirmed  Chronic Obstructive Pulmonary Disease (COPD) / SNOMED CT 74759864 / Confirmed  Dyslipidemia, goal LDL below 130 / ICD-9-.4 / Confirmed  Eczema / ICD-9-.9 / Confirmed  HEADACHE / ICD-9-.0 / Confirmed  HTN, goal below 140/90 / SNOMED CT 4039996690 / Confirmed  Glucose intolerance (impaired glucose tolerance) / SNOMED CT 79438146 / Confirmed  Mild Major Depression / ICD-9-.21 / Confirmed  Myofacial muscle pain / ICD-9-.1 / Confirmed  Obesity / ICD-9-.00 / Probable  Rhinitis, chronic / ICD-9-.0 / Confirmed  Seborrheic Dermatitis / ICD-9-.10 / Confirmed  Inactive: Tobacco user / ICD-9-.1      Histories   Past Medical History:    Active  Obesity (278.00)  Eczema (692.9)  Seborrheic Dermatitis (690.10)  HEADACHE (784.0)  Backpain (724.5)   Family History:    Sleep apnea syndrome  Mother  Father  Hypertension  Mother  Father  Sister (Oralia, )  Brother (slava)  COPD  Father  Mother  Arthritis  Mother  Uterine  cancer  Mother  Lung cancer..  Sister (Oralia, )  Obesity  Mother     Procedure history:    Appendectomy ([id number]) on 1984 at 28 Years.  Left Kidney Surgery in  at 17 Years.   Social History:        Alcohol Assessment            Past      Tobacco Assessment: Past            Past                     Comments:                      2014 - Holly PINON, Mark Gallo       Employment and Education Assessment            Unemployed      Home and Environment Assessment            Marital status: .      Exercise and Physical Activity Assessment            Exercise frequency: Never.        Physical Examination   Vital Signs   2017 11:58 AM CDT Systolic Blood Pressure 136 mmHg    Diastolic Blood Pressure 76 mmHg    Mean Arterial Pressure 96 mmHg    BP Site Right arm    BP Method Manual   2017 11:46 AM CDT Peripheral Pulse Rate 77 bpm    Systolic Blood Pressure 147 mmHg  HI    Diastolic Blood Pressure 90 mmHg    Mean Arterial Pressure 109 mmHg    BP Site Right arm      Measurements from flowsheet : Measurements   2017 11:46 AM CDT Height Measured - Standard 63 in    Weight Measured - Standard 208 lb    BSA 2.05 m2    Body Mass Index 36.84 kg/m2      General:  Alert and oriented, No acute distress.    Eye:  Normal conjunctiva.    HENT:  Normocephalic, Oral mucosa is moist, No pharyngeal erythema.    Neck:  Supple, Non-tender, No carotid bruit, No lymphadenopathy, No thyromegaly, Trigger points bilaterally.    Respiratory:  Lungs are clear to auscultation, Respirations are non-labored.    Cardiovascular:  Normal rate, Regular rhythm, No murmur, No gallop, Normal peripheral perfusion, No edema.    Musculoskeletal:  Normal gait.    Feet:  Normal by visual exam, Normal pulses, Sensation intact, By monofilament exam, By vibration.    Neurologic:  No focal deficits.    Cognition and Speech:  Speech clear and coherent, Functional cognition intact.    Psychiatric:   Cooperative, Appropriate mood & affect, Non-suicidal.       Review / Management   Results review:  Lab results   9/12/2017 9:30 AM CDT Sodium Level 140 mmol/L    Potassium Level 4.3 mmol/L    Chloride Level 103 mmol/L    CO2 Level 28 mmol/L    Glucose Level 112 mg/dL  HI    BUN 19 mg/dL    Creatinine Level 0.80 mg/dL    BUN/Creat Ratio NOT APPLICABLE    eGFR 78 mL/min/1.73m2    eGFR African American 90 mL/min/1.73m2    Calcium Level 9.6 mg/dL    Hgb A1c 6.1  HI    Cholesterol 156 mg/dL    Non-HDL Cholesterol 94    HDL 62 mg/dL    Cholesterol/HDL Ratio 2.5    LDL 79    Triglyceride 71 mg/dL    U Microalbumin 1.6 mg/dL    Ur Creatinine 199 mg/dL    Ur Microalbumin/Creatinine Ratio 8   .    Radiology results   X-ray, PA and lat chest , Reported at  2/4/2014 2:02:00 PM, Reviewed radiologist's report, Interpretation:  Negative chest   ECG interpretation:  Time  2/3/2014 2:41:00 PM, Rate  68  beats per minute, Within normal limits.    PFT 2/3/2014: Moderately severe obstruction      Impression and Plan   Diagnosis     Obesity (WTH32-UD E66.9).     Mild Major Depression (FDY86-KS F32.0).     Glucose intolerance (impaired glucose tolerance) (NTC55-NV R73.02).     HTN, goal below 140/90 (MEX57-KP I10).     Dyslipidemia, goal LDL below 130 (TFT59-SR E78.5).     Chronic Obstructive Pulmonary Disease (COPD) (IVJ99-SM J44.9).     Course:  Progressing as expected.    Patient Instructions:       Counseled: Patient, Regarding medications, Diet, Verbalized understanding, Weight loss and regular physical activity.    Orders     Orders (Selected)   Outpatient Orders  Ordered  Return to Clinic (Request): RFV: Annual Wellness, Return in 6 months  Return to Office (Request): RFV: Annual Lipids, BMP, XAVI. Hgb A1c q6 months  Prescriptions  Prescribed  Anoro Ellipta 62.5 mcg-25 mcg/inh inhalation powder: 1 puff(s), inh, daily, # 30 EA, 11 Refill(s), Type: Maintenance, Pharmacy: [facility], 1 puff(s) inh daily  FLUoxetine 20 mg oral  capsule: 1 cap(s) ( 20 mg ), PO, Daily, # 30 cap(s), 11 Refill(s), Type: Maintenance, Pharmacy: [facility], 1 cap(s) po daily  Flonase 50 mcg/inh nasal spray: 2 spray(s), nasal, daily, # 1 EA, 11 Refill(s), Pharmacy: [facility], 2 spray(s) nasal daily  Maxzide-25 oral tablet: See Instructions, Instructions: 0.5 tab(s) po daily, # 30 EA, 11 Refill(s), Type: Maintenance, Pharmacy: [facility], 0.5 tab(s) po daily  Ventolin HFA 90 mcg/inh inhalation aerosol: 2 puff(s), inh, q4 hrs, PRN: as needed for wheezing, # 1 EA, 11 Refill(s), Pharmacy: [facility], 2 puff(s) inh q4 hrs,PRN:as needed for wheezing  simvastatin 40 mg oral tablet: 1 tab(s) ( 40 mg ), PO, Once a day (at bedtime), # 30 tab(s), 11 Refill(s), Type: Maintenance, Pharmacy: [facility], 1 tab(s) po hs.     Diagnosis     FH: hearing loss (HUV10-LT Z82.2).     Asymmetrical right sensorineural hearing loss (SJP40-MH H90.41).     Course:  Improving: none.    Orders     Orders (Selected)   Outpatient Orders  Ordered  Referral - Internal (Request): 09/18/17 11:50:00 CDT, Referred to: Audiology, Referred to: Paola Barbour, Reason for referral: Failed Hearing Screen, FH: hearing loss.

## 2022-02-15 NOTE — NURSING NOTE
Quick Intake Entered On:  10/2/2020 11:17 AM CDT    Performed On:  10/2/2020 11:17 AM CDT by Mark Barrera MD               Summary   Advance Directive :   No   Height Measured :   63 in(Converted to: 5 ft 3 in, 160.02 cm)    Systolic Blood Pressure :   126 mmHg   Diastolic Blood Pressure :   72 mmHg   Mean Arterial Pressure :   90 mmHg   BP Site :   Right arm   BP Method :   Manual   Mark Barrera MD - 10/2/2020 11:17 AM CDT

## 2022-02-15 NOTE — TELEPHONE ENCOUNTER
---------------------  From: Mulu Lindsey CMA   To: Appointment Pool ([id number]_[place] - [place]);     Sent: 9/9/2019 4:03:58 PM CDT  Subject: Due appt        Can you try calling pt one more time?         From: Cadence Hernandez   To: Recall  [place] ([id number]_[place] - [place]);     Sent: 6/11/2019 2:07:42 PM CDT Show up: 7/11/2019 6:00:00 AM CDT  Subject: CHRONIC DISEASE VISIT    Due Date/Time: 7/11/2019 6:00:00 AM CDT      Return to Clinic for the following per JDL                                      Reason for visit: AWV, HTN CHECK                                                          Due: 1 - 2 MONTHS                           Additional Comments: NONE          Addendum by Rolanda Meyer on July 11, 2019 12:59:53 PM CDT (Verified)  Sent letter 1 for AWV/HTN check per JDL.   DX:  HTN, dyslipidemia  No further contact will be made by the recall coordinators regarding this appt. If nothing has been done in THREE weeks, please forward reminder to the Appointment Pool ([place]).      Addendum by Rolanda Meyer on August 01, 2019 7:11:41 AM CDT (Verified)  Forwarded to Appointment Pool.    Provider requesting patient complete services:  Due for:                 (_) fasting labs (nothing to eat or drink for 12 hrs prior to labs, may sip clear water and take medications morning of labs with water only)   (_) non-fasting labs  (_) urine labs (be prepared to leave a urine specimen)  (_) BP check   (_) follow-up appointment with Yenni Pickard RD, CDE, CD  (X) follow-up appointment with your Healthcare Provider  Condition following up on: Dx: HTN, dyslipidemia - needs AWV       Provider:  Mark Barrera MD, FACP                           ---------------------  From: Rolanda Meyer (Recall  Pool ([id number]))   To: Appointment Pool ([id number]);     Sent: 8/1/2019 7:11:45 AM CDT ! Show up: 8/1/2019 7:11:00 AM CDT      Addendum by Brandee Smalls on August 01, 2019 10:36:53 AM CDT (Verified)  MARGOT  to schedule.LEFT MESSAGE FOR PATIENT TO SCHEDULE.

## 2022-02-15 NOTE — TELEPHONE ENCOUNTER
Entered by Bunny Huertas LPN on July 13, 2021 1:04:41 PM CDT  ---------------------  From: Bunny Huertas LPN   To: [facility]    Sent: 7/13/2021 1:04:40 PM CDT  Subject: Medication Management     ** Not Approved: Refill not appropriate, Filled today 7-13-21 **  FLUoxetine (FLUOXETINE HYDROCHLORIDE 10MG CAPSULE)  TAKE TWO CAPSULES BY MOUTH ONCE DAILY  Qty:  180 EA        Days Supply:  90        Refills:  3          Substitutions Allowed     Route To Pharmacy - [facility]   Signed by Bunny Huertas LPN            ** Patient matched by Bunny Huertas LPN on 7/13/2021 1:02:51 PM CDT **      ------------------------------------------  From: [employer]  To: Mark Barrera MD  Sent: July 12, 2021 8:35:37 AM CDT  Subject: Medication Management  Due: June 4, 2021 8:25:53 PM CDT     ** On Hold Pending Signature **     Drug: FLUoxetine (PROzac 10 mg oral capsule), TAKE TWO CAPSULES BY MOUTH ONCE DAILY  Quantity: 180 EA  Days Supply: 90  Refills: 3  Substitutions Allowed  Notes from Pharmacy:     Dispensed Drug: FLUoxetine (FLUoxetine 10 mg oral capsule), TAKE TWO CAPSULES BY MOUTH ONCE DAILY  Quantity: 180 EA  Days Supply: 90  Refills: 3  Substitutions Allowed  Notes from Pharmacy:  ------------------------------------------

## 2022-02-15 NOTE — NURSING NOTE
Medicare Visit Entered On:  10/2/2020 11:00 AM CDT    Performed On:  10/2/2020 10:54 AM CDT by Loni Lam               Summary   Chief Complaint :   AWV.    Weight Measured :   190 lb(Converted to: 190 lb 0 oz, 86.183 kg)    Height Measured :   63 in(Converted to: 5 ft 3 in, 160.02 cm)    Body Mass Index :   33.65 kg/m2 (HI)    Body Surface Area :   1.96 m2   Systolic Blood Pressure :   152 mmHg (HI)    Diastolic Blood Pressure :   84 mmHg (HI)    Mean Arterial Pressure :   107 mmHg   Peripheral Pulse Rate :   71 bpm   BP Site :   Right arm   BP Method :   Manual   HR Method :   Electronic   Temperature Tympanic :   97.3 DegF(Converted to: 36.3 DegC)  (LOW)    Oxygen Saturation :   92 % (LOW)    Loni Lam - 10/2/2020 10:54 AM CDT   Health Status   Allergies Verified? :   Yes   Medication History Verified? :   Yes   Medical History Verified? :   Yes   Pre-Visit Planning Status :   Completed   Tobacco Use? :   Former smoker   Loni Lam - 10/2/2020 10:54 AM CDT   Demographics   Last Name :   CHAO   Address :   [address] [place] [address]   First Name :   TU Mckeon Initial :   A   Responsible Party Date of Birth () :   1955 CDT   City :   [place]   State :   [place]   Zip Code :   [address]   Loni Lam - 10/2/2020 10:54 AM CDT   Consents   Consent for Immunization Exchange :   Consent Granted   Consent for Immunizations to Providers :   Consent Granted   Loni Lam - 10/2/2020 10:54 AM CDT   Meds / Allergies   (As Of: 10/2/2020 11:00:47 AM CDT)   Allergies (Active)   No Known Medication Allergies  Estimated Onset Date:   Unspecified ; Created By:   Etelvina Galan MA; Reaction Status:   Active ; Category:   Drug ; Substance:   No Known Medication Allergies ; Type:   Allergy ; Updated By:   Etelvina Galan MA; Reviewed Date:   10/2/2020 10:58 AM CDT        Medication List   (As Of: 10/2/2020 11:00:47 AM CDT)   Prescription/Discharge Order    fluticasone nasal  :   fluticasone nasal ;  Status:   Prescribed ; Ordered As Mnemonic:   fluticasone 50 mcg/inh nasal spray ; Simple Display Line:   See Instructions, INHALE 2 SPRAYS IN EACH NOSTRIL ONCE DAILY, 16 unknown unit, 11 Refill(s) ; Ordering Provider:   Mark Barrera MD; Catalog Code:   fluticasone nasal ; Order Dt/Tm:   9/1/2020 11:54:38 AM CDT          simvastatin  :   simvastatin ; Status:   Prescribed ; Ordered As Mnemonic:   simvastatin 40 mg oral tablet ; Simple Display Line:   40 mg, 1 tab(s), Oral, hs, 90 tab(s), 3 Refill(s) ; Ordering Provider:   Mark Barrera MD; Catalog Code:   simvastatin ; Order Dt/Tm:   9/1/2020 10:25:45 AM CDT          albuterol  :   albuterol ; Status:   Prescribed ; Ordered As Mnemonic:   Ventolin HFA 90 mcg/inh inhalation aerosol ; Simple Display Line:   2 puff(s), inh, q4 hrs, PRN: as needed for wheezing, 2 EA, 11 Refill(s) ; Ordering Provider:   Mark Barrera MD; Catalog Code:   albuterol ; Order Dt/Tm:   7/13/2020 2:56:24 PM CDT          FLUoxetine  :   FLUoxetine ; Status:   Prescribed ; Ordered As Mnemonic:   FLUoxetine 10 mg oral capsule ; Simple Display Line:   20 mg, 2 cap(s), Oral, daily, for 90 day(s), 180 cap(s), 3 Refill(s) ; Ordering Provider:   Mark Barrera MD; Catalog Code:   FLUoxetine ; Order Dt/Tm:   7/13/2020 2:55:42 PM CDT          hydrochlorothiazide-triamterene  :   hydrochlorothiazide-triamterene ; Status:   Prescribed ; Ordered As Mnemonic:   hydrochlorothiazide-triamterene 25 mg-37.5 mg oral tablet ; Simple Display Line:   See Instructions, 0.5 tab(s) Oral daily, 45 EA, 3 Refill(s) ; Ordering Provider:   Mark Barrera MD; Catalog Code:   hydrochlorothiazide-triamterene ; Order Dt/Tm:   7/13/2020 2:53:49 PM CDT            Geriatric Depression Screening   Geriatric Depression Satisfied Life :   Yes   Geriatric Depression Bored :   Yes   Geriatric Depression Afraid Bad Things :   No   Geriatric Depression Feel Helpless :   No   Geriatric Depression Feel Worthless :   No   Geriatric  Depression Situation Hopeless :   No   Geriatric Depression Others Better Off :   No   Geriatric Depression Full of Energy :   No   Loni Lam M - 10/2/2020 10:54 AM CDT   Home Safety Screen   Aware of Smoking Dangers :   Yes   Smoke Alarms/Fire Extinguisher Available :   Yes   Household Members Fire Safety Knowledge :   Yes   Firearms Unloaded and Secure :   Yes   Floor Rugs Removed or Fastened :   No   Mats in Bathtub/Shower :   Yes   Stairway Rails or Banisters :   Yes   Outdoor Clutter Safety :   Yes   Electrical Cord Safety :   Yes   Loni Lam  - 10/2/2020 10:54 AM CDT   Advance Directives   Advance Directive :   No   Loni Lam  - 10/2/2020 10:54 AM CDT   Functional Assessment   Focused Functional Assessment Grid   Bathing :   Independent (2)   Dressing :   Independent (2)   Toileting :   Independent (2)   Transferring Bed or Chair :   Independent (2)   Continence :   Independent (2)   Feeding :   Independent (2)   Loni Lam  - 10/2/2020 10:54 AM CDT   ADL Index Score :   12    Capable of Shopping :   Yes   Capable of Walking :   Yes   Capable of Housekeeping :   Yes   Capable of Managing Medications :   Yes   Capable of Handling Finances :   Yes   Loni Lam  - 10/2/2020 10:54 AM CDT

## 2022-02-15 NOTE — NURSING NOTE
Comprehensive Intake Entered On:  6/10/2019 10:53 AM CDT    Performed On:  6/10/2019 10:45 AM CDT by Loni Lam               Summary   Chief Complaint :   f/u labs    Weight Measured :   198 lb(Converted to: 198 lb 0 oz, 89.81 kg)    Height Measured :   63 in(Converted to: 5 ft 3 in, 160.02 cm)    Body Mass Index :   35.07 kg/m2 (HI)    Body Surface Area :   2 m2   Systolic Blood Pressure :   160 mmHg (HI)    Diastolic Blood Pressure :   90 mmHg (HI)    Mean Arterial Pressure :   113 mmHg   Peripheral Pulse Rate :   62 bpm   BP Site :   Right arm   Pulse Site :   Radial artery   BP Method :   Manual   HR Method :   Manual   Temperature Tympanic :   97.7 DegF(Converted to: 36.5 DegC)  (LOW)    Loni Lam - 6/10/2019 10:45 AM CDT   Health Status   Allergies Verified? :   Yes   Medication History Verified? :   Yes   Medical History Verified? :   Yes   Pre-Visit Planning Status :   Completed   Tobacco Use? :   Former smoker   Loni Lam - 6/10/2019 10:45 AM CDT   Consents   Consent for Immunization Exchange :   Consent Granted   Consent for Immunizations to Providers :   Consent Granted   Loni Lam - 6/10/2019 10:45 AM CDT   Meds / Allergies   (As Of: 6/10/2019 10:53:28 AM CDT)   Allergies (Active)   No Known Medication Allergies  Estimated Onset Date:   Unspecified ; Created By:   Etelvina Galan MA; Reaction Status:   Active ; Category:   Drug ; Substance:   No Known Medication Allergies ; Type:   Allergy ; Updated By:   Etelvina Galan MA; Reviewed Date:   6/10/2019 10:49 AM CDT        Medication List   (As Of: 6/10/2019 10:53:28 AM CDT)   Prescription/Discharge Order    simvastatin  :   simvastatin ; Status:   Prescribed ; Ordered As Mnemonic:   simvastatin 40 mg oral tablet ; Simple Display Line:   40 mg, 1 tab(s), PO, Once a day (at bedtime), 30 tab(s), 11 Refill(s) ; Ordering Provider:   Mark Barrera MD; Catalog Code:   simvastatin ; Order Dt/Tm:   9/28/2018 9:46:16 AM           hydrochlorothiazide-triamterene  :   hydrochlorothiazide-triamterene ; Status:   Prescribed ; Ordered As Mnemonic:   Maxzide-25 oral tablet ; Simple Display Line:   See Instructions, 0.5 tab(s) po daily, 30 EA, 11 Refill(s) ; Ordering Provider:   Mark Barrera MD; Catalog Code:   hydrochlorothiazide-triamterene ; Order Dt/Tm:   9/28/2018 9:46:13 AM          fluticasone nasal  :   fluticasone nasal ; Status:   Prescribed ; Ordered As Mnemonic:   Flonase 50 mcg/inh nasal spray ; Simple Display Line:   2 spray(s), nasal, daily, 1 EA, 11 Refill(s) ; Ordering Provider:   Mark Barrera MD; Catalog Code:   fluticasone nasal ; Order Dt/Tm:   9/28/2018 9:46:08 AM          FLUoxetine  :   FLUoxetine ; Status:   Prescribed ; Ordered As Mnemonic:   FLUoxetine 20 mg oral capsule ; Simple Display Line:   20 mg, 1 cap(s), PO, Daily, 30 cap(s), 11 Refill(s) ; Ordering Provider:   Mark Barrera MD; Catalog Code:   FLUoxetine ; Order Dt/Tm:   9/28/2018 9:45:30 AM          albuterol  :   albuterol ; Status:   Prescribed ; Ordered As Mnemonic:   Ventolin HFA 90 mcg/inh inhalation aerosol ; Simple Display Line:   2 puff(s), inh, q4 hrs, PRN: as needed for wheezing, 1 EA, 11 Refill(s) ; Ordering Provider:   Mark Barrera MD; Catalog Code:   albuterol ; Order Dt/Tm:   9/28/2018 9:45:02 AM

## 2022-02-22 ENCOUNTER — OFFICE VISIT - RIVER FALLS (OUTPATIENT)
Dept: FAMILY MEDICINE | Facility: CLINIC | Age: 67
End: 2022-02-22
Payer: MEDICARE

## 2022-03-02 VITALS
HEIGHT: 63 IN | HEART RATE: 66 BPM | TEMPERATURE: 97 F | DIASTOLIC BLOOD PRESSURE: 72 MMHG | BODY MASS INDEX: 31.98 KG/M2 | WEIGHT: 180.5 LBS | OXYGEN SATURATION: 96 % | SYSTOLIC BLOOD PRESSURE: 120 MMHG

## 2022-03-02 NOTE — PROCEDURES
Accession Number:       [id number]  CLINICAL INFORMATION::     None given  LMP::     NONE GIVEN  PREV. PAP::     NONE GIVEN  PREV. BX::     NONE GIVEN  SOURCE::     None given  STATEMENT OF ADEQUACY::     Satisfactory for evaluation. Endocervical/transformation zone component present. Partially obscuring inflammation  GENERAL CATEGORIZATION::     EPITHELIAL CELL ABNORMALITY  INTERPRETATION/RESULT::     Atypical Squamous Cells of Undetermined Significance (ASC-US) Atrophic pattern; predominantly parabasal cells  COMMENT::     This Pap test has been evaluated with computer assisted technology.  CYTOTECHNOLOGIST::     CRISTAL MANN(ASCP) CT Screening location: [place] [id number]  PATHOLOGIST::     See comment       Susy Dennis M.D. Board Certified in Anatomic Pathology,       Clinical Pathology and Cytopathology, Specializing in       Urologic Pathology 3  (electronic signature)  COMMENT:     See comment       EXPLANATORY NOTE:         The Pap is a screening test for cervical cancer. It is       not a diagnostic test and is subject to false negative       and false positive results. It is most reliable when a       satisfactory sample, regularly obtained, is submitted       with relevant clinical findings and history, and when       the Pap result is evaluated along with historic and       current clinical information.  CHLAMYDIA TRACHOMATIS RNA, TMA, UROGENITAL:     NOT DETECTED  NEISSERIA GONORRHOEAE RNA, TMA, UROGENITAL:     NOT DETECTED  COMMENT:     See comment       The analytical performance characteristics of this       assay, when used to test SurePath(TM) specimens have been       determined by [employer]. The modifications have       not been cleared or approved by the FDA. This assay has       been validated pursuant to the CLIA regulations and is       used for clinical purposes.         For additional information, please refer to        https://education.[employer].com/faq/UYU393       (This link is being provided for information/       educational purposes only.)

## 2022-03-02 NOTE — TELEPHONE ENCOUNTER
CHAO TU : 1955 MRN: [id number]  PHONE NOTE:  The patient had experienced a couple episodes of bleeding. Pap smear returns and is ASCUS with negative high-risk HPV as well as negative GC and chlamydia.    P: I reviewed the results with the patient and recommended a visit with Dr. villeda; the patient will call and schedule this.    D:  2022  T:  2022                                              Mark Barrera MD, FACP

## 2022-03-02 NOTE — TELEPHONE ENCOUNTER
---------------------  From: Angelita Overton RN (Phone Messages Pool ([id number] - [place]))   To: Mark Barrera MD;     Sent: 1/28/2022 2:10:10 PM CST  Subject: spotting     Time of Call:  1349      Person Calling:  patient  Phone number:  [phone]    Note:   Patient calls with some concern about recent vaginal spotting. She has a new partner and has resumed sexual activity after 15 years of being celibate.  We discuss the changes to the vaginal wall post menopause and also discuss the need to assure prevention of STDs and R/O post menopausal spotting/bleeding unrelated to her intercourse but related to a pathology. She is encouraged to have a ladonna discussion about these issues. She will have her annual visit with JO next week and will discuss this then. She will be seen sooner if she has more bleeding.---------------------  From: Mark Barrera MD   To: Phone Messages Pool ([id number] - [place]);     Sent: 1/28/2022 2:25:32 PM CST  Subject: RE: spotting     OK

## 2022-03-02 NOTE — LETTER
(Inserted Image. Unable to display)   January 14, 2022  [place] [address]        Dear TU,    Thank you for selecting [facility] for your healthcare needs.    Our records indicate you are due for the following services:     Annual Wellness Visit  Hypertension check ~ Please remember to bring your at-home blood pressure readings with you to your appointment.     (FYI   Regarding office visits: In some instances, a video visit or telephone visit may be offered as an option.)    To schedule an appointment or if you have further questions, please contact your clinic at [phone].    Powered by [employer]    Sincerely,    Mark Barrera MD, FACP

## 2022-03-02 NOTE — PROGRESS NOTES
"   Patient:   TU CHAO            MRN: [id number]            FIN: [id number]               Age:   66 years     Sex:  Female     :  1955   Associated Diagnoses:   Asymmetrical hearing loss; Chronic Obstructive Pulmonary Disease (COPD); Dyslipidemia, goal LDL below 130; Glucose intolerance (impaired glucose tolerance); HTN, goal below 140/90; Mild major depression; Obesity; Rhinitis, chronic; Wellness examination   Author:   Mark Barrera MD      Visit Information   Visit type:  Annual Exam.    Accompanied by:  No one.    Source of history:  Self.    History limitation:  None.       Chief Complaint   2022 7:13 AM CST     Medicare AWV.      History of Present Illness    The patient is a 66-year-old with history of chronic obstructive pulmonary disease, dyslipidemia, glucose intolerance, hypertension, depression, obesity, and chronic rhinitis here for a wellness visit.     She has become sexually active within the past year.  The first couple of times she had a little bit of red blood noted but since then not at all.  No dyspareunia or pain.  She is happy with the relationship; it is a friend she has had for a long time.  She had not been sexually active for more than a decade after the death of her .     She notes decreased hearing on the right after her hearing screen today.  She walks the dog twice a day for up to 15 minutes each time.       On complete review of systems she notes weight loss, occasional chills, tinnitus, decreased hearing, chronic rhinitis, sneezing, congestion, shortness of breath and wheezing due to chronic obstructive pulmonary disease are \"pretty good\".  She notes occasional heart fluttering, rapid heartbeat, chronic back and muscle pain, and occasional headaches.      GDS short form score is 3.  She drinks two glasses of wine a couple days of the week.     No significant issues on the Health Risk Assessment form.     Health History is reviewed.      I discussed colon cancer " screening with her yet again and she admits to having a Cologuard box in her closet from her last health maintenance visit.  She declines colon cancer screening after discussion of the options.  She also declines mammography.          Review of Systems   See HPI       Health Status   Allergies:    Allergic Reactions (Selected)  No Known Medication Allergies   Medications:  (Selected)   Prescriptions  Prescribed  FLUoxetine 10 mg oral capsule: = 1 cap(s) ( 10 mg ), Oral, bid, x 90 day(s), # 180 cap(s), 3 Refill(s), Type: Physician Stop, Pharmacy: [facility], 1 cap(s) Oral bid,x90 day(s), 63, in, 10/02/20 11:17:00 CDT, Height Measured, 182, lb, 07/13/21 9:14:00 CDT, Weight Measured  Ventolin HFA 90 mcg/inh inhalation aerosol: 2 puff(s), inh, q4 hrs, PRN: as needed for wheezing, # 2 EA, 11 Refill(s), Pharmacy: [facility], 2 puff(s) Inhale q4 hrs,PRN:as needed for wheezing, 63, in, 10/02/20 11:17:00 CDT, Height Measured, 182, lb, 07/13/21 9:14:00 CDT, Weight Measured  fluticasone 50 mcg/inh nasal spray: See Instructions, Instructions: INHALE 2 SPRAYS IN EACH NOSTRIL ONCE DAILY, # 16 unknown unit, 11 Refill(s), Type: Maintenance, Pharmacy: [facility], INHALE 2 SPRAYS IN EACH NOSTRIL ONCE DAILY, 63, in, 10/02/20 11:17:00 CDT, Height Measured, 182, lb...  hydrochlorothiazide-triamterene 25 mg-37.5 mg oral tablet: See Instructions, Instructions: 0.5 tab(s) Oral daily, # 45 EA, 3 Refill(s), Type: Maintenance, Pharmacy: [facility], 0.5 tab(s) Oral daily, 63, in, 10/02/20 11:17:00 CDT, Height Measured, 182, lb, 07/13/21 9:14:00 CDT, Weight Measured  simvastatin 40 mg oral tablet: = 1 tab(s) ( 40 mg ), Oral, hs, # 90 tab(s), 3 Refill(s), Type: Maintenance, Pharmacy: [facility], Annual due now, 1 tab(s) Oral hs, 63, in, 10/02/20 11:17:00 CDT, Height Measured, 182, lb, 07/13/21 9:14:00 CDT, Weight Measured  Documented Medications  Documented  Fish Oil: 1 tab, Oral, daily, 0 Refill(s), Type: Maintenance  Vitamin B6: 1  tab, Oral, daily, 0 Refill(s), Type: Maintenance  Vitamin C 500 mg oral tablet: = 1 tab(s) ( 500 mg ), Oral, daily, 0 Refill(s), Type: Maintenance  Vitamin D with Minerals oral tablet: 1 tab(s), Oral, daily, 0 Refill(s), Type: Maintenance  vitamin E: 1 tab, Oral, daily, 0 Refill(s), Type: Maintenance,    Medications          *denotes recorded medication          FLUoxetine 10 mg oral capsule: 10 mg, 1 cap(s), Oral, bid, for 90 day(s), 180 cap(s), 3 Refill(s).          Ventolin HFA 90 mcg/inh inhalation aerosol: 2 puff(s), inh, q4 hrs, PRN: as needed for wheezing, 2 EA, 11 Refill(s).          *Vitamin C 500 mg oral tablet: 500 mg, 1 tab(s), Oral, daily, 0 Refill(s).          fluticasone 50 mcg/inh nasal spray: See Instructions, INHALE 2 SPRAYS IN EACH NOSTRIL ONCE DAILY, 16 unknown unit, 11 Refill(s).          hydrochlorothiazide-triamterene 25 mg-37.5 mg oral tablet: See Instructions, 0.5 tab(s) Oral daily, 45 EA, 3 Refill(s).          *Vitamin D with Minerals oral tablet: 1 tab(s), Oral, daily, 0 Refill(s).          *Fish Oil: 1 tab, Oral, daily, 0 Refill(s).          *Vitamin B6: 1 tab, Oral, daily, 0 Refill(s).          simvastatin 40 mg oral tablet: 40 mg, 1 tab(s), Oral, hs, 90 tab(s), 3 Refill(s).          *vitamin E: 1 tab, Oral, daily, 0 Refill(s).       Problem list:    All Problems  Back pain / SNOMED CT 723706220 / Confirmed  Chronic Obstructive Pulmonary Disease (COPD) / SNOMED CT 06359497 / Confirmed  Dyslipidemia, goal LDL below 130 / SNOMED CT 56276784 / Confirmed  Eczema / SNOMED CT 51315159 / Confirmed  Glucose intolerance (impaired glucose tolerance) / SNOMED CT 14944747 / Confirmed  Headache / SNOMED CT 88990499 / Confirmed  HTN, goal below 140/90 / SNOMED CT 3819567275 / Confirmed  Mild major depression / SNOMED CT 591618402 / Confirmed  Myofacial muscle pain / SNOMED CT 815007694 / Confirmed  Nephrolithiasis / SNOMED CT 063847878 / Confirmed  Obesity / SNOMED CT 8892699467 / Probable  Rhinitis,  chronic / SNOMED CT 705074863 / Confirmed  Seborrheic dermatitis / SNOMED CT 21764887 / Confirmed  Wellness examination / SNOMED CT 507551048 / Confirmed  Inactive: Tobacco user / ICD-9-.1  Resolved: Gall stone pancreatitis / SNOMED CT 307832749      Histories   Past Medical History:    Active  Mild major depression (707802703): Onset on 2012 at 57 years.  Obesity (8312074109)  Eczema (51471218)  Seborrheic dermatitis (77115213)  Headache (32141194)  Back pain (286079075)  Dyslipidemia, goal LDL below 130 (86969876)  Comments:  2012 CDT 11:37 AM CDT - Holly PINON, Mark HO 10-year risk 6%  Rhinitis, chronic (014839477)  Myofacial muscle pain (306196663)  Resolved  Gall stone pancreatitis (345694385):  Resolved.   Family History:    Cancer  Grandparent  Sleep apnea syndrome  Mother  Father  Diabetes mellitus  Father  Hypertension  Mother  Father  Sister (Oralia, )  Brother (slava)  COPD  Father  Mother  Arthritis  Mother  Depression  Mother  Uterine cancer  Mother  Lung cancer..  Sister (Oralia, )  Obesity  Mother     Procedure history:    ERCP (7138001251) on 2018 at 62 Years.  Laparoscopic cholecystectomy (78921256) on 4/15/2018 at 62 Years.  Appendectomy (845367560) on 1984 at 28 Years.  Left Kidney Surgery in  at 17 Years.   Social History:        Electronic Cigarette/Vaping Assessment            Electronic Cigarette Use: Never.      Alcohol Assessment            Current, Wine (5 oz), 1-2 times per week, 1 drinks/episode average.  2 drinks/episode maximum.  Ready to               change: No.      Tobacco Assessment            Former smoker, quit more than 30 days ago, Cigarettes, 20 per day.  40 year(s).  Household tobacco concerns:               No.                     Comments:                      10/05/2020 - Loretta Xiao                     Quit in       Employment and Education Assessment            Unemployed      Home and Environment Assessment             Marital status: .  Living situation: Home/Independent.  Smoker in household: No.               Injuries/Abuse/Neglect in household: No.  Feels unsafe at home: No.  Family/Friends available for support:               Yes.  Risks in environment: Stairs.      Nutrition and Health Assessment            Type of diet: Regular.  Wants to lose weight: Yes.  Feels highly stressed: No.      Exercise and Physical Activity Assessment: Occasional exercise            Exercise frequency: Occasional.      Sexual Assessment            Sexually active: No.  Identifies as female, Sexual orientation: Straight or heterosexual.  History of STD:               No.  Contraceptive Use Details: Abstinence.  History of sexual abuse: No.        Physical Examination   Vital Signs   2/7/2022 7:13 AM CST Temperature Tympanic 97.0 DegF  LOW    Peripheral Pulse Rate 66 bpm    HR Method Electronic    Systolic Blood Pressure 120 mmHg    Diastolic Blood Pressure 72 mmHg    Mean Arterial Pressure 88 mmHg    BP Site Right arm    BP Method Manual    Oxygen Saturation 96 %      Measurements from flowsheet : Measurements   2/7/2022 7:13 AM CST Height Measured - Standard 63 in    Weight Measured - Standard 180.5 lb    BSA 1.91 m2    Body Mass Index 31.97 kg/m2  HI      General:  Alert and oriented, No acute distress.    Eye:  Normal conjunctiva.    HENT:  Normocephalic, Tympanic membranes are clear, Oral mucosa is moist, No pharyngeal erythema.    Neck:  Supple, Non-tender, No carotid bruit, No lymphadenopathy, No thyromegaly.    Respiratory:  Lungs are clear to auscultation, Respirations are non-labored.    Cardiovascular:  Normal rate, Regular rhythm, No murmur, No gallop, Normal peripheral perfusion, No edema.         Arterial pulses: Bilateral, Carotid, Posterior tibial, Within normal limits.    Gastrointestinal:  Soft, Non-tender, Non-distended, No organomegaly.    Genitourinary:          Groin/ inguinal region: Within normal limits.          Labia: Within normal limits.         Urethra: Within normal limits.         Vagina: Within normal limits.         Cervix: Mucosa ( Atrophy ).         Uterus: Not enlarged.         Ovaries: Not enlarged.         Adnexa: Not enlarged.    Musculoskeletal:  Normal gait.    Integumentary:  No pallor.    Neurologic:  No focal deficits.    Cognition and Speech:  Oriented, Speech clear and coherent, Functional cognition intact.    Psychiatric:  Cooperative, Appropriate mood & affect, Non-suicidal.       Review / Management   Results review:  Lab results   7/13/2021 9:55 AM CDT Sodium Level 140 mmol/L    Potassium Level 4.0 mmol/L    Chloride Level 101 mmol/L    CO2 Level 30 mmol/L    Glucose Level 101 mg/dL  HI    BUN 23 mg/dL    Creatinine Level 0.88 mg/dL    BUN/Creat Ratio NOT APPLICABLE    eGFR 68 mL/min/1.73m2    eGFR African American 78 mL/min/1.73m2    Calcium Level 9.9 mg/dL    Hgb A1c 6.1  HI    Cholesterol 146 mg/dL    Non-HDL Cholesterol 83    HDL 63 mg/dL    Cholesterol/HDL Ratio 2.3    LDL 69    Triglyceride 60 mg/dL   .       Impression and Plan   Diagnosis     Asymmetrical hearing loss (ZDL77-FP H91.8X9).     Chronic Obstructive Pulmonary Disease (COPD) (LIA80-KC J44.9).     Dyslipidemia, goal LDL below 130 (JMV26-LX E78.5).     Glucose intolerance (impaired glucose tolerance) (ISD43-KI R73.02).     HTN, goal below 140/90 (BWT88-NS I10).     Mild major depression (HFU90-QD F32.0).     Obesity (FBU48-SO E66.9).     Rhinitis, chronic (LEN95-QY J31.0).     Wellness examination (NJU90-EC Z00.00).     Patient Instructions:       Counseled: Patient, Diet, Activity, Verbalized understanding, weight loss.    Summary:  HTN, dyslipidemia, prediabetes stable. Asymmetric hearing loss-Audiology. Atrophic vaginitis currently asymptomatic..    Orders     Orders (Selected)   Outpatient Orders  Ordered  Referral - Internal (Request): 02/07/22 7:42:00 CST, Referred to: Audiology, Reason for referral: Hearing loss right > L,  Asymmetrical hearing loss  Return to Clinic (Request): RFV: Annual Wellness, Return in 12 months  Return to Office (Request): RFV: Annual Lipids, BMP, XAVI. Hgb A1c q6 months  Ordered (Dispatched)  Thinprep tis pap reflex hpv mRNA Chlamydia/N.Gonorrhoeae* ([facility]): Specimen Type: Pap, Collection Date: 02/07/22 7:53:00 CST  Prescriptions  Prescribed  FLUoxetine 10 mg oral capsule: = 1 cap(s) ( 10 mg ), Oral, bid, x 90 day(s), # 180 cap(s), 3 Refill(s), Type: Physician Stop, Pharmacy: [facility], 1 cap(s) Oral bid,x90 day(s), 63, in, 10/02/20 11:17:00 CDT, Height Measured, 182, lb, 07/13/21 9:14:00 CDT, Weight Measured  Ventolin HFA 90 mcg/inh inhalation aerosol: 2 puff(s), inh, q4 hrs, PRN: as needed for wheezing, # 2 EA, 11 Refill(s), Pharmacy: [facility], 2 puff(s) Inhale q4 hrs,PRN:as needed for wheezing, 63, in, 10/02/20 11:17:00 CDT, Height Measured, 182, lb, 07/13/21 9:14:00 CDT, Weight Measured  fluticasone 50 mcg/inh nasal spray: See Instructions, Instructions: INHALE 2 SPRAYS IN EACH NOSTRIL ONCE DAILY, # 16 unknown unit, 11 Refill(s), Type: Maintenance, Pharmacy: [facility], INHALE 2 SPRAYS IN EACH NOSTRIL ONCE DAILY, 63, in, 10/02/20 11:17:00 CDT, Height Measured, 182, lb...  hydrochlorothiazide-triamterene 25 mg-37.5 mg oral tablet: See Instructions, Instructions: 0.5 tab(s) Oral daily, # 45 EA, 3 Refill(s), Type: Maintenance, Pharmacy: [facility], 0.5 tab(s) Oral daily, 63, in, 10/02/20 11:17:00 CDT, Height Measured, 182, lb, 07/13/21 9:14:00 CDT, Weight Measured  simvastatin 40 mg oral tablet: = 1 tab(s) ( 40 mg ), Oral, hs, # 90 tab(s), 3 Refill(s), Type: Maintenance, Pharmacy: [facility], Annual due now, 1 tab(s) Oral hs, 63, in, 10/02/20 11:17:00 CDT, Height Measured, 182, lb, 07/13/21 9:14:00 CDT, Weight Measured  Documented Medications  Documented  Fish Oil: 1 tab, Oral, daily, 0 Refill(s), Type: Maintenance  Vitamin B6: 1 tab, Oral, daily, 0 Refill(s), Type: Maintenance  Vitamin C 500 mg  oral tablet: = 1 tab(s) ( 500 mg ), Oral, daily, 0 Refill(s), Type: Maintenance  Vitamin D with Minerals oral tablet: 1 tab(s), Oral, daily, 0 Refill(s), Type: Maintenance  vitamin E: 1 tab, Oral, daily, 0 Refill(s), Type: Maintenance.     Reviewed options and recommended  colon cancer screening.     Annual Mammo and Flu recommended.     Complete Shingrix.

## 2022-03-02 NOTE — NURSING NOTE
Medicare Visit Entered On:  2/7/2022 7:20 AM CST    Performed On:  2/7/2022 7:13 AM CST by Loni Lam               Summary   Chief Complaint :   Medicare AWV.   Advance Directive :   No   Weight Measured :   180.5 lb(Converted to: 180 lb 8 oz, 81.873 kg)    Height Measured :   63 in(Converted to: 5 ft 3 in, 160.02 cm)    Body Mass Index :   31.97 kg/m2 (HI)    Body Surface Area :   1.91 m2   Systolic Blood Pressure :   120 mmHg   Diastolic Blood Pressure :   72 mmHg   Mean Arterial Pressure :   88 mmHg   Peripheral Pulse Rate :   66 bpm   BP Site :   Right arm   BP Method :   Manual   HR Method :   Electronic   Temperature Tympanic :   97.0 DegF(Converted to: 36.1 DegC)  (LOW)    Oxygen Saturation :   96 %   Loni Lam - 2/7/2022 7:13 AM CST   Health Status   Allergies Verified? :   Yes   Medication History Verified? :   Yes   Medical History Verified? :   Yes   Pre-Visit Planning Status :   Completed   Tobacco Use? :   Former smoker   Loni Lam - 2/7/2022 7:13 AM CST   Consents   Consent for Immunization Exchange :   Consent Granted   Consent for Immunizations to Providers :   Consent Granted   Loni Lam - 2/7/2022 7:13 AM CST   Meds / Allergies   (As Of: 2/7/2022 7:20:23 AM CST)   Allergies (Active)   No Known Medication Allergies  Estimated Onset Date:   Unspecified ; Created By:   Etelvina Galan MA; Reaction Status:   Active ; Category:   Drug ; Substance:   No Known Medication Allergies ; Type:   Allergy ; Updated By:   Etelvina Galan MA; Reviewed Date:   2/7/2022 7:16 AM CST        Medication List   (As Of: 2/7/2022 7:20:23 AM CST)   Prescription/Discharge Order    diclofenac topical  :   diclofenac topical ; Status:   Completed ; Ordered As Mnemonic:   Voltaren 1% topical gel ; Simple Display Line:   2 gm, Topical, qid, PRN: pain, 100 gm, 0 Refill(s) ; Ordering Provider:   Roshan Soto PA-C; Catalog Code:   diclofenac topical ; Order Dt/Tm:   12/28/2021 12:15:01 PM CST           FLUoxetine  :   FLUoxetine ; Status:   Prescribed ; Ordered As Mnemonic:   FLUoxetine 10 mg oral capsule ; Simple Display Line:   10 mg, 1 cap(s), Oral, bid, for 90 day(s), 180 cap(s), 3 Refill(s) ; Ordering Provider:   Mark Barrera MD; Catalog Code:   FLUoxetine ; Order Dt/Tm:   7/13/2021 9:37:06 AM CDT          albuterol  :   albuterol ; Status:   Prescribed ; Ordered As Mnemonic:   Ventolin HFA 90 mcg/inh inhalation aerosol ; Simple Display Line:   2 puff(s), inh, q4 hrs, PRN: as needed for wheezing, 2 EA, 11 Refill(s) ; Ordering Provider:   Mark Barrera MD; Catalog Code:   albuterol ; Order Dt/Tm:   7/13/2021 9:36:04 AM CDT          fluticasone nasal  :   fluticasone nasal ; Status:   Prescribed ; Ordered As Mnemonic:   fluticasone 50 mcg/inh nasal spray ; Simple Display Line:   See Instructions, INHALE 2 SPRAYS IN EACH NOSTRIL ONCE DAILY, 16 unknown unit, 11 Refill(s) ; Ordering Provider:   Mark Barrera MD; Catalog Code:   fluticasone nasal ; Order Dt/Tm:   7/13/2021 9:36:07 AM CDT          simvastatin  :   simvastatin ; Status:   Prescribed ; Ordered As Mnemonic:   simvastatin 40 mg oral tablet ; Simple Display Line:   40 mg, 1 tab(s), Oral, hs, 90 tab(s), 3 Refill(s) ; Ordering Provider:   Mark Barrera MD; Catalog Code:   simvastatin ; Order Dt/Tm:   7/13/2021 9:35:53 AM CDT          hydrochlorothiazide-triamterene  :   hydrochlorothiazide-triamterene ; Status:   Prescribed ; Ordered As Mnemonic:   hydrochlorothiazide-triamterene 25 mg-37.5 mg oral tablet ; Simple Display Line:   See Instructions, 0.5 tab(s) Oral daily, 45 EA, 3 Refill(s) ; Ordering Provider:   Mark Barrera MD; Catalog Code:   hydrochlorothiazide-triamterene ; Order Dt/Tm:   7/13/2021 9:35:59 AM CDT            Home Meds    chondroitin-glucosamine  :   chondroitin-glucosamine ; Status:   Completed ; Ordered As Mnemonic:   [employer] ; Simple Display Line:   1 tab, Oral, daily, 0 Refill(s) ; Catalog Code:    chondroitin-glucosamine ; Order Dt/Tm:   4/29/2021 12:11:15 PM CDT          vitamin E  :   vitamin E ; Status:   Documented ; Ordered As Mnemonic:   vitamin E ; Simple Display Line:   1 tab, Oral, daily, 0 Refill(s) ; Catalog Code:   vitamin E ; Order Dt/Tm:   4/29/2021 12:10:05 PM CDT          pyridoxine  :   pyridoxine ; Status:   Documented ; Ordered As Mnemonic:   Vitamin B6 ; Simple Display Line:   1 tab, Oral, daily, 0 Refill(s) ; Catalog Code:   pyridoxine ; Order Dt/Tm:   4/29/2021 12:10:21 PM CDT          ascorbic acid  :   ascorbic acid ; Status:   Documented ; Ordered As Mnemonic:   Vitamin C 500 mg oral tablet ; Simple Display Line:   500 mg, 1 tab(s), Oral, daily, 0 Refill(s) ; Catalog Code:   ascorbic acid ; Order Dt/Tm:   4/29/2021 12:09:31 PM CDT          turmeric  :   turmeric ; Status:   Completed ; Ordered As Mnemonic:   turmeric 500 mg oral capsule ; Simple Display Line:   500 mg, 1 cap(s), Oral, daily, 0 Refill(s) ; Catalog Code:   turmeric ; Order Dt/Tm:   4/29/2021 12:09:23 PM CDT          multivitamin with minerals  :   multivitamin with minerals ; Status:   Documented ; Ordered As Mnemonic:   Vitamin D with Minerals oral tablet ; Simple Display Line:   1 tab(s), Oral, daily, 0 Refill(s) ; Catalog Code:   multivitamin with minerals ; Order Dt/Tm:   4/29/2021 12:09:44 PM CDT          omega-3 polyunsaturated fatty acids  :   omega-3 polyunsaturated fatty acids ; Status:   Documented ; Ordered As Mnemonic:   Fish Oil ; Simple Display Line:   1 tab, Oral, daily, 0 Refill(s) ; Catalog Code:   omega-3 polyunsaturated fatty acids ; Order Dt/Tm:   4/29/2021 12:09:11 PM CDT            Geriatric Depression Screening   Geriatric Depression Satisfied Life :   Yes   Geriatric Depression Dropped Activities :   No   Geriatric Depression Life Empty :   No   Geriatric Depression Bored :   Yes   Geriatric Depression Good Spirits :   Yes   Geriatric Depression Afraid Bad Things :   No   Geriatric Depression  Feel Happy :   Yes   Geriatric Depression Feel Helpless :   No   Geriatric Depression Prefer to Stay Home :   Yes   Geriatric Depression Memory Problems :   No   Geriatric Depression Wonderful Be Alive :   Yes   Geriatric Depression Feel Worthless :   No   Geriatric Depression Situation Hopeless :   No   Geriatric Depression Others Better Off :   No   Geriatric Depression Full of Energy :   No   Geriatric Depression Total Score :   3    Loni Lam [facility] 2/7/2022 8:21 AM CST   Hearing and Vision Screening   Audiogram Result Right Ear :   Fail   Audiogram Result Left Ear :   Pass   Hearing Screen Comments :   missed all on R ear   Loni Lam [facility] 2/7/2022 7:13 AM CST   Home Safety Screen   Emergency Numbers Kept/Updated :   No   Aware of Smoking Dangers :   Yes   Smoke Alarms/Fire Extinguisher Available :   Yes   Household Members Fire Safety Knowledge :   Yes   Firearms Unloaded and Secure :   Yes   Floor Rugs Removed or Fastened :   No   Mats in Bathtub/Shower :   Yes   Stairway Rails or Banisters :   Yes   Outdoor Clutter Safety :   Yes   Indoor Clutter Safety :   Yes   Electrical Cord Safety :   Yes   Loni Lam [facility] 2/7/2022 8:21 AM CST   Advance Directives   Advance Directive :   No   Loni Lam [facility] 2/7/2022 7:13 AM CST   Mascorro Fall Risk   History of Fall in Last 3 Months Mascorro :   No   Loni Lam  - 2/7/2022 8:21 AM CST   Functional Assessment   Focused Functional Assessment Grid   Bathing :   Independent (2)   Dressing :   Independent (2)   Toileting :   Independent (2)   Transferring Bed or Chair :   Independent (2)   Continence :   Independent (2)   Feeding :   Independent (2)   Loni Lam [facility] 2/7/2022 8:21 AM CST   ADL Index Score :   12    Capable of Shopping :   Yes   Capable of Walking :   Yes   Capable of Housekeeping :   Yes   Capable of Managing Medications :   Yes   Capable of Handling Finances :   Yes   Loni Lam [facility] 2/7/2022 8:21 AM CST

## 2022-03-02 NOTE — TELEPHONE ENCOUNTER
---------------------  From: Christie Nelson RN (Phone Messages Pool ([id number]_[facility]))   Sent: 2/11/2022 8:46:23 AM CST  Subject: General Message       PCP:  JO      Time of Call:  8:41am       Person Calling:  pt  Phone number:  [phone]    Note:   TC received from pt, stating JO called her and she missed the call.   Collaboration with JO, he will call her back.

## 2022-03-22 ENCOUNTER — APPOINTMENT (OUTPATIENT)
Dept: AUDIOLOGY | Facility: CLINIC | Age: 67
End: 2022-03-22
Payer: MEDICARE

## 2022-03-22 ENCOUNTER — TELEPHONE (OUTPATIENT)
Dept: AUDIOLOGY | Facility: CLINIC | Age: 67
End: 2022-03-22
Payer: MEDICARE

## 2022-03-22 ENCOUNTER — TRANSFERRED RECORDS (OUTPATIENT)
Dept: HEALTH INFORMATION MANAGEMENT | Facility: CLINIC | Age: 67
End: 2022-03-22

## 2022-03-22 DIAGNOSIS — H90.3 ASYMMETRICAL SENSORINEURAL HEARING LOSS: Primary | ICD-10-CM

## 2022-03-22 NOTE — TELEPHONE ENCOUNTER
This is being forwarded to Dr. Barrera since he was the one who placed the referral.   Thanks,  HAILEY Mcfarlane

## 2022-03-22 NOTE — TELEPHONE ENCOUNTER
Reason for Call: Request for an order or referral:    Order or referral being requested: Hearing Health Evaluation    Date needed: Other    Has the patient been seen by the PCP for this problem? YES    Additional comments: Patient called stating she was referred for further hearing evaluation. Patient states they called to set up appointment and the closet place is [place] or [facility]. Patient does not drive that far. Patient is wondering if she could be referred to somewhere in [place] for this? Please advise.     Phone number Patient can be reached at:  Home number on file [phone] (home)    Best Time:  anytime    Can we leave a detailed message on this number?  YES    Call taken on 3/22/2022 at 2:31 PM by Daniella Garcia

## 2022-04-01 ENCOUNTER — TELEPHONE (OUTPATIENT)
Dept: FAMILY MEDICINE | Facility: CLINIC | Age: 67
End: 2022-04-01
Payer: MEDICARE

## 2022-04-01 DIAGNOSIS — H90.3 ASYMMETRICAL SENSORINEURAL HEARING LOSS: Primary | ICD-10-CM

## 2022-04-01 NOTE — TELEPHONE ENCOUNTER
Please call patient to schedule with Dr. Coles. Had mildly abnormal pap with some spotting. Dr. Barrera recommended obgyn consult but does not appear that has been scheduled yet.

## 2022-04-05 NOTE — TELEPHONE ENCOUNTER
Called and notified patient about ENT referral placed. Patient states she would like to go to somewhere in [place] for ENT instead of in the [facility].

## 2022-04-05 NOTE — TELEPHONE ENCOUNTER
Please notify the patient that an ENT referral was made due to asymmetric hearing loss on the advise of audiologist.

## 2022-04-06 ENCOUNTER — OFFICE VISIT (OUTPATIENT)
Dept: OBGYN | Facility: CLINIC | Age: 67
End: 2022-04-06
Payer: MEDICARE

## 2022-04-06 VITALS
DIASTOLIC BLOOD PRESSURE: 89 MMHG | HEART RATE: 75 BPM | WEIGHT: 179.8 LBS | TEMPERATURE: 97.8 F | BODY MASS INDEX: 31.85 KG/M2 | SYSTOLIC BLOOD PRESSURE: 133 MMHG

## 2022-04-06 DIAGNOSIS — R87.610 ATYPICAL SQUAMOUS CELLS OF UNDETERMINED SIGNIFICANCE ON CYTOLOGIC SMEAR OF CERVIX (ASC-US): Primary | ICD-10-CM

## 2022-04-06 PROCEDURE — 99203 OFFICE O/P NEW LOW 30 MIN: CPT | Performed by: OBSTETRICS & GYNECOLOGY

## 2022-04-06 RX ORDER — ALBUTEROL SULFATE 90 UG/1
1-2 AEROSOL, METERED RESPIRATORY (INHALATION) 4 TIMES DAILY PRN
COMMUNITY
End: 2022-09-19

## 2022-04-06 RX ORDER — FLUTICASONE PROPIONATE 50 MCG
2 SPRAY, SUSPENSION (ML) NASAL DAILY
COMMUNITY
Start: 2021-07-13 | End: 2022-08-10

## 2022-04-06 RX ORDER — TRIAMTERENE/HYDROCHLOROTHIAZID 37.5-25 MG
0.5 TABLET ORAL DAILY
COMMUNITY
Start: 2021-07-13 | End: 2022-10-05

## 2022-04-06 RX ORDER — SIMVASTATIN 40 MG
40 TABLET ORAL AT BEDTIME
COMMUNITY
Start: 2021-07-13 | End: 2022-09-19

## 2022-04-06 RX ORDER — FLUOXETINE 10 MG/1
10 CAPSULE ORAL 2 TIMES DAILY
COMMUNITY
Start: 2021-07-13 | End: 2022-04-11

## 2022-04-06 NOTE — PROGRESS NOTES
Subjective-the patient is referred by Dr. Garcia for follow-up of ASCUS Pap smear.  She was in for visit with concerns of sexually transmitted disease when she had experienced some postcoital bleeding.  The bleeding was apparently traumatic injury the first couple of times with intercourse but has since resolved.  The chlamydia and gonorrhea cultures were negative.  Pap smear was atypical.  The patient has no history of abnormal Pap smears that she can recall.    Objective-colposcopy findings normal atrophic cervix.  There is no acetowhite change.  The vagina is also atrophic with no lesions seen.  The cervical os is closed and there is no blood in the vaginal vault.    Assessment atypical Pap smear related to atrophic vaginal state without lesions found.    Plan the patient will follow up as needed no future pap smears should be necessary.

## 2022-04-07 DIAGNOSIS — F32.A DEPRESSION: Primary | ICD-10-CM

## 2022-04-11 RX ORDER — FLUOXETINE 10 MG/1
CAPSULE ORAL
Qty: 180 CAPSULE | Refills: 0 | Status: SHIPPED | OUTPATIENT
Start: 2022-04-11 | End: 2022-10-11

## 2022-04-25 ENCOUNTER — OFFICE VISIT (OUTPATIENT)
Dept: FAMILY MEDICINE | Facility: CLINIC | Age: 67
End: 2022-04-25
Payer: MEDICARE

## 2022-04-25 VITALS
DIASTOLIC BLOOD PRESSURE: 72 MMHG | SYSTOLIC BLOOD PRESSURE: 128 MMHG | TEMPERATURE: 97.6 F | HEART RATE: 80 BPM | WEIGHT: 179.9 LBS | BODY MASS INDEX: 31.87 KG/M2

## 2022-04-25 DIAGNOSIS — R87.610 ATYPICAL SQUAMOUS CELLS OF UNDETERMINED SIGNIFICANCE (ASCUS) ON PAPANICOLAOU SMEAR OF CERVIX: ICD-10-CM

## 2022-04-25 DIAGNOSIS — I10 PRIMARY HYPERTENSION: ICD-10-CM

## 2022-04-25 DIAGNOSIS — R10.9 LEFT FLANK PAIN: Primary | ICD-10-CM

## 2022-04-25 PROBLEM — L21.9 SEBORRHEIC DERMATITIS: Status: ACTIVE | Noted: 2022-04-25

## 2022-04-25 PROBLEM — N20.0 CALCULUS OF KIDNEY: Status: ACTIVE | Noted: 2022-04-25

## 2022-04-25 PROBLEM — E66.9 OBESITY: Status: ACTIVE | Noted: 2022-04-25

## 2022-04-25 PROBLEM — K80.20 CHOLELITHIASIS: Status: ACTIVE | Noted: 2018-04-14

## 2022-04-25 PROBLEM — M54.9 BACK PAIN: Status: ACTIVE | Noted: 2022-04-25

## 2022-04-25 PROBLEM — K85.10 ACUTE BILIARY PANCREATITIS: Status: ACTIVE | Noted: 2018-04-14

## 2022-04-25 PROBLEM — E78.5 HYPERLIPIDEMIA: Status: ACTIVE | Noted: 2022-04-25

## 2022-04-25 PROBLEM — L30.9 ECZEMA: Status: ACTIVE | Noted: 2022-04-25

## 2022-04-25 PROBLEM — J31.0 CHRONIC RHINITIS: Status: ACTIVE | Noted: 2022-04-25

## 2022-04-25 PROBLEM — M79.18 MYOFASCIAL PAIN: Status: ACTIVE | Noted: 2022-04-25

## 2022-04-25 PROBLEM — R73.02 IMPAIRED GLUCOSE TOLERANCE: Status: ACTIVE | Noted: 2022-04-25

## 2022-04-25 LAB
ALBUMIN UR-MCNC: ABNORMAL MG/DL
APPEARANCE UR: ABNORMAL
BACTERIA #/AREA URNS HPF: ABNORMAL /HPF
BILIRUB UR QL STRIP: NEGATIVE
COLOR UR AUTO: ABNORMAL
GLUCOSE UR STRIP-MCNC: NEGATIVE MG/DL
HGB UR QL STRIP: ABNORMAL
KETONES UR STRIP-MCNC: NEGATIVE MG/DL
LEUKOCYTE ESTERASE UR QL STRIP: ABNORMAL
NITRATE UR QL: NEGATIVE
PH UR STRIP: 6 [PH] (ref 5–7)
RBC #/AREA URNS AUTO: ABNORMAL /HPF
SP GR UR STRIP: 1.01 (ref 1–1.03)
SQUAMOUS #/AREA URNS AUTO: ABNORMAL /LPF
UROBILINOGEN UR STRIP-ACNC: 0.2 E.U./DL
WBC #/AREA URNS AUTO: ABNORMAL /HPF

## 2022-04-25 PROCEDURE — 87186 SC STD MICRODIL/AGAR DIL: CPT | Performed by: INTERNAL MEDICINE

## 2022-04-25 PROCEDURE — 81001 URINALYSIS AUTO W/SCOPE: CPT | Performed by: INTERNAL MEDICINE

## 2022-04-25 PROCEDURE — 99214 OFFICE O/P EST MOD 30 MIN: CPT | Performed by: INTERNAL MEDICINE

## 2022-04-25 PROCEDURE — 87086 URINE CULTURE/COLONY COUNT: CPT | Performed by: INTERNAL MEDICINE

## 2022-04-25 RX ORDER — CHLORAL HYDRATE 500 MG
1 CAPSULE ORAL DAILY
COMMUNITY
Start: 2021-04-29

## 2022-04-25 ASSESSMENT — ENCOUNTER SYMPTOMS
FREQUENCY: 0
HEMATURIA: 1
NAUSEA: 0
VOMITING: 0
SHORTNESS OF BREATH: 0
DIARRHEA: 0
FLANK PAIN: 1
BRUISES/BLEEDS EASILY: 0
COUGH: 0
HEADACHES: 0

## 2022-04-25 NOTE — PROGRESS NOTES
"  Assessment & Plan     Left flank pain  No tenderness on exam.  Associated with what she thinks was blood in her urine at least diet dark urine today.  UA with large blood we will get a urine culture and CT scan  - UA reflex to Microscopic - lab collect; Future    Primary hypertension  Controlled    Atypical squamous cells of undetermined significance (ASCUS) on Papanicolaou smear of cervix  Colposcopy negative               BMI:   Estimated body mass index is 31.87 kg/m  as calculated from the following:    Height as of 2/7/22: 1.6 m (5' 3\").    Weight as of this encounter: 81.6 kg (179 lb 14.4 oz).   Weight management plan: Discussed healthy diet and exercise guidelines        No follow-ups on file.    Mark Barrera MD  [facility]    Rogelio Santiago is a 66 year old who presents for the following health issues     UTI  Pertinent negatives include no chest pain, coughing, headaches, nausea or vomiting.   History of Present Illness       Reason for visit:  Blood in urine at first time this morning. Ache inside, feeling slightly I ll. A lot better now. Side still aches a bit.  Symptom onset:  Today    She eats 0-1 servings of fruits and vegetables daily.She consumes 1 sweetened beverage(s) daily.She exercises with enough effort to increase her heart rate 20 to 29 minutes per day.  She exercises with enough effort to increase her heart rate 5 days per week.   She is taking medications regularly.     Patient complains of what she thinks was blood in the urine this morning it was dark.  He has had a little discomfort in the left flank as well.  No other urinary complaints.          Review of Systems   Respiratory: Negative for cough and shortness of breath.    Cardiovascular: Negative for chest pain.   Gastrointestinal: Negative for diarrhea, nausea and vomiting.   Genitourinary: Positive for flank pain, hematuria and vaginal bleeding. Negative for frequency, urgency and vaginal pain. "   Neurological: Negative for headaches.   Hematological: Does not bruise/bleed easily.            Objective    /72   Pulse 80   Temp 97.6  F (36.4  C)   Wt 81.6 kg (179 lb 14.4 oz)   BMI 31.87 kg/m    Body mass index is 31.87 kg/m .  Physical Exam   Patient appears comfortable.  Alert and oriented.  In good spirits.  Chest clear.  No CVA tenderness.  Abdomen nontender.  Cardiac exam is regular no murmur.  No edema.

## 2022-04-26 ENCOUNTER — TELEPHONE (OUTPATIENT)
Dept: FAMILY MEDICINE | Facility: CLINIC | Age: 67
End: 2022-04-26
Payer: MEDICARE

## 2022-04-26 DIAGNOSIS — N20.1 OBSTRUCTION OF LEFT URETEROPELVIC JUNCTION (UPJ) DUE TO STONE: Primary | ICD-10-CM

## 2022-04-26 LAB — BACTERIA UR CULT: ABNORMAL

## 2022-04-26 RX ORDER — LEVOFLOXACIN 500 MG/1
500 TABLET, FILM COATED ORAL DAILY
Qty: 7 TABLET | Refills: 0 | Status: SHIPPED | OUTPATIENT
Start: 2022-04-26 | End: 2022-05-03

## 2022-04-26 NOTE — TELEPHONE ENCOUNTER
Patient notified. She was very upset because of everything that is going on with her health. I did give pt phone number to [facility] and advised her to call to try to get an appointment

## 2022-04-26 NOTE — TELEPHONE ENCOUNTER
----- Message from Mark Barrera MD sent at 4/26/2022  1:12 PM CDT -----  Team - please call patient with results. E. Coli UTI> Levaquin prescription sent to [facility]. Call if not improving.

## 2022-04-26 NOTE — TELEPHONE ENCOUNTER
Patient calling back stating that she could get a ride to [place] if needed. Patient is also wondering about the surgery for the kidney stones. Wondering if there is something more simple that can be done. Please call patient at [phone] or [phone].

## 2022-04-26 NOTE — TELEPHONE ENCOUNTER
Patient notified. She states that she does no want to drive to [place]. She says she does not drive into cities. Wondering if there is a facility closer that she can go to instead

## 2022-04-26 NOTE — TELEPHONE ENCOUNTER
Patient requesting results of CT Abdomen Pelvis done yesterday at [facility].  Patient states she was told at [facility] yesterday that she would be called with the results that day.    Advised patient results not in EMR yet.    Please call patient with results when available.    Cell  [phone]    Detailed message okay.

## 2022-04-27 NOTE — PROGRESS NOTES
Assessment/Plan:    Assessment & Plan   Pamela was seen today for new patient.    Diagnoses and all orders for this visit:    Calculus of kidney    E. coli UTI    Hydronephrosis with ureteropelvic junction (UPJ) obstruction    Stone Management Plan  Stone Management 4/28/2022 4/28/2022   Urinary Tract Infection Suspected Infection -   Renal Colic Well controlled symptoms -   Renal Failure No suspicion of renal failure -   Current CT date 4/25/2022 -   Right sided stones? No -   R Stone Event No current event -   Left sided stones? Yes -   L Number of ureteral stones No ureteral stones -   L Number of kidney stones  3 -   L GSD of kidney stones 10 - 15 -   L Hydronephrosis Moderate -   L Stone Event New event -   Diagnosis date 4/25/2022 -   Initial location of primary symptomatic stone Renal -   Initial GSD of primary symptomatic stone 12 15   L Current Plan Clear -   Clear rationale Associated treated infection -         PLAN     67 yo F first time unidentified stone former with remote history of recurrent UTI's in early adulthood s/p surgical intervention. Recently diagnosed e. Coli UTI and left hydronephrosis with non-obstructing left renal stones.     May have underlying UPJ obstruction given recent CT findings and remote history of left kidney surgery (pyeloplasty). Will discuss best way to proceed with Dr. Nowak. Reviewed with patient further evaluation with imaging and/or surgical intervention. Would be appropriate to clear left renal stones but no current indication to proceed emergently. May require staged clearance pending intraoperative findings. Risks and benefits were detailed of ureteroscopic stone clearance including potential issues of urinary or systemic infection, ureteral injury, inaccessible stone, incomplete stone clearance, multiple surgeries, and stent related symptoms of urgency, frequency and hematuria. Patient verbalized understanding. Patient agrees with plan as discussed. Preoperative  evaluation with primary care has been requested.    Addendum:   Spoke with Dr. Nowak and will proceed with renogram to evaluate for underlying occult UPJ obstruction.     For documented infection, she should finish course of levaquin. Over the counter symptom control medications of ibuprofen, Dramamine and Tylenol were recommended.    Telephone call duration: 14 minutes  22 minutes spent on the date of the encounter doing chart review, history and exam, documentation and further activities per the note    Jillian Douglas PA-C  [facility]    Subjective:     HPI  Ms. Pamela Shelton is a 66 year old  female who is being evaluated via a billable telephone visit by [facility] referred by PCP for nephrolithiasis.    She is a first time unidentified composition stone former. She has no identified modifiable stone risk factors. She has identified non-modifiable stone risks including:  multiple stones at presentation.    She was recently evaluated by PCP for gross hematuria and acute left flank pain. Underwent CT 4/25 which reported left hydronephrosis and nonobstructing left renal stones. Urine culture grew pansensitive e. Coli, started on 7 day course of levaquin 4/26. She reports remote left kidney surgery @ age 17 for recurrent UTI's, without complication.    She is doing better, taking levaquin but notes mild nausea. Pertinent negative current symptoms include:  fever, chills, right flank pain, left flank pain, hematuria, urinary frequency and dysuria.     Outside CT scan from 4/25/22 is personally reviewed and demonstrates moderate left hydronephrosis at UPJ with no ureteral stone/lesion. There are 3 left renal stones, 15 mm mid pole, 3 mm mid pole and 12 mm lower pole. No right sided stones or obstruction.    Significant labs from presentation include severe hematuria, moderate pyuria, negative nitrite, moderate bacteria,  K e.coli  identified on urine culture and normal creatinine.    ROS   A 12 point comprehensive review of systems is negative except for HPI    Past Medical History:   Diagnosis Date     Kidney stone 04/2022     History reviewed. No pertinent surgical history.    Current Outpatient Medications   Medication Sig Dispense Refill     albuterol (PROAIR HFA/PROVENTIL HFA/VENTOLIN HFA) 108 (90 Base) MCG/ACT inhaler Inhale 1-2 puffs into the lungs       diclofenac (VOLTAREN) 1 % topical gel Apply 2 g topically       fish oil-omega-3 fatty acids 1000 MG capsule        FLUoxetine (PROZAC) 10 MG capsule TAKE ONE CAPSULE BY MOUTH TWICE DAILY 180 capsule 0     fluticasone (FLONASE) 50 MCG/ACT nasal spray 1 spray daily       levofloxacin (LEVAQUIN) 500 MG tablet Take 1 tablet (500 mg) by mouth daily for 7 days 7 tablet 0     simvastatin (ZOCOR) 40 MG tablet Take 40 mg by mouth       triamterene-HCTZ (MAXZIDE-25) 37.5-25 MG tablet 0.5 tablets daily         Allergies   Allergen Reactions     Erythromycin Nausea       Social History     Socioeconomic History     Marital status:      Spouse name: Not on file     Number of children: Not on file     Years of education: Not on file     Highest education level: Not on file   Occupational History     Not on file   Tobacco Use     Smoking status: Former Smoker     Smokeless tobacco: Never Used   Substance and Sexual Activity     Alcohol use: Not Currently     Drug use: Never     Sexual activity: Not on file   Other Topics Concern     Not on file   Social History Narrative     Not on file     Social Determinants of Health     Financial Resource Strain: Not on file   Food Insecurity: Not on file   Transportation Needs: Not on file   Physical Activity: Not on file   Stress: Not on file   Social Connections: Not on file   Intimate Partner Violence: Not on file   Housing Stability: Not on file       Family History   Problem Relation Age of Onset     Heart Failure Mother      Diabetes Father       Hypertension Father      Gout Brother      Cancer Sister         Lung mestatized       Objective:     No vitals or physical exam obtained due to virtual visit    LABS  Most Recent 3 BMP's:No lab results found.  Most Recent 6 Bacteria Isolates From Any Culture (See EPIC Reports for Culture Details):No lab results found.  Most Recent Urinalysis:  Recent Labs   Lab Test 04/25/22  1138   COLOR Light Yellow   APPEARANCE Slightly Cloudy*   URINEGLC Negative   URINEBILI Negative   URINEKETONE Negative   SG 1.010   UBLD Large*   URINEPH 6.0   PROTEIN Trace*   UROBILINOGEN 0.2   NITRITE Negative   LEUKEST Large*   RBCU 10-25*   WBCU *     Acute Labs   Urine Culture    Culture   Date Value Ref Range Status   04/25/2022 50,000-100,000 CFU/mL Escherichia coli (A)  Final

## 2022-04-28 ENCOUNTER — VIRTUAL VISIT (OUTPATIENT)
Dept: UROLOGY | Facility: CLINIC | Age: 67
End: 2022-04-28
Payer: MEDICARE

## 2022-04-28 DIAGNOSIS — N20.0 CALCULUS OF KIDNEY: Primary | ICD-10-CM

## 2022-04-28 DIAGNOSIS — Q62.11 HYDRONEPHROSIS WITH URETEROPELVIC JUNCTION (UPJ) OBSTRUCTION: ICD-10-CM

## 2022-04-28 DIAGNOSIS — Q62.11 HYDRONEPHROSIS WITH URETEROPELVIC JUNCTION (UPJ) OBSTRUCTION: Primary | ICD-10-CM

## 2022-04-28 DIAGNOSIS — B96.20 E. COLI UTI: ICD-10-CM

## 2022-04-28 DIAGNOSIS — N39.0 E. COLI UTI: ICD-10-CM

## 2022-04-28 PROCEDURE — 99203 OFFICE O/P NEW LOW 30 MIN: CPT | Mod: 95 | Performed by: PHYSICIAN ASSISTANT

## 2022-04-28 ASSESSMENT — PAIN SCALES - GENERAL: PAINLEVEL: NO PAIN (0)

## 2022-04-28 NOTE — PROGRESS NOTES
Patient is roomed via telephone for a virtual visit.  Patient confirmed she is in the [facility] at the time of this appointment.  Patient understands that this virtual visit is billable and agree to proceed with appointment.

## 2022-05-18 NOTE — PROGRESS NOTES
Assessment/Plan:    Assessment & Plan   Pamela was seen today for follow up.    Diagnoses and all orders for this visit:    Calculus of kidney  -     Patient Stated Goal: Know what to expect after surgery    Personal history of urinary tract infection    Stone Management Plan  Stone Management 4/28/2022 4/28/2022   Urinary Tract Infection Suspected Infection -   Renal Colic Well controlled symptoms -   Renal Failure No suspicion of renal failure -   Current CT date 4/25/2022 -   Right sided stones? No -   R Stone Event No current event -   Left sided stones? Yes -   L Number of ureteral stones No ureteral stones -   L Number of kidney stones  3 -   L GSD of kidney stones 10 - 15 -   L Hydronephrosis Moderate -   L Stone Event New event -   Diagnosis date 4/25/2022 -   Initial location of primary symptomatic stone Renal -   Initial GSD of primary symptomatic stone 12 15   L Current Plan Clear -   Clear rationale Associated treated infection -         PLAN    67 yo F first time unidentified stone former with remote history of recurrent UTI's in early adulthood s/p surgical intervention. Recently treated e. Coli UTI. No occult obstruction identified with renogram. Non-obstructing dominant left renal stones, currently asymptomatic.      Would be appropriate to proceed with ureterscopic stone clearance of right renal stones within next few weeks. Will discuss with Dr. Nowak. Risks and benefits were detailed of ureteroscopic stone clearance including potential issues of urinary or systemic infection, ureteral injury, inaccessible stone, incomplete stone clearance, multiple surgeries, and stent related symptoms of urgency, frequency and hematuria Patient verbalized understanding. Patient agrees with plan as discussed. Preoperative evaluation with primary care has been requested.    She will need to be on antibiotics 1 week prior to surgery for infection prophylaxis.    Telephone call duration: 15 minutes  21 minutes spent  on the date of the encounter doing chart review, history and exam, documentation and further activities per the note    Jillian Douglas PA-C  [facility]    HPI  Ms. Pamela Shelton is a 66 year old  female who is being evaluated via a billable telephone visit by [facility] for follow up nephrolithiasis.    She is a first time unidentified composition stone former who has not required stone clearance procedures. She has not previously participated in stone risk evaluation. She has no identified modifiable stone risk factors. She has identified non-modifiable stone risks including:  multiple stones at presentation.    She returns today to review lasix renogram given previous CT findings and concern of left UPJ obstruction.    She has been doing well. No pain. Slight , intermittent nausea. Pertinent negative current symptoms include:  fever, chills, left flank pain, nausea, vomiting, hematuria, urinary frequency and dysuria.     Lasix renogram from 5/19/22 is personally reviewed and demonstrates no evidence of obstruction with prompt clearance of radiotracer, T1/2 of 4.5 mintues on left and T1/2 of 1 minture on right. Differential function of 52% on left and 48% on right. Patulous left renal pelvis.    ROS   Review of systems is negative except for HPI.    Past Medical History:   Diagnosis Date     Kidney stone 04/2022     No past surgical history on file.    Current Outpatient Medications   Medication Sig Dispense Refill     albuterol (PROAIR HFA/PROVENTIL HFA/VENTOLIN HFA) 108 (90 Base) MCG/ACT inhaler Inhale 1-2 puffs into the lungs       diclofenac (VOLTAREN) 1 % topical gel Apply 2 g topically       fish oil-omega-3 fatty acids 1000 MG capsule        FLUoxetine (PROZAC) 10 MG capsule TAKE ONE CAPSULE BY MOUTH TWICE DAILY 180 capsule 0     fluticasone (FLONASE) 50 MCG/ACT nasal spray 1 spray daily       simvastatin (ZOCOR) 40 MG tablet Take  40 mg by mouth       triamterene-HCTZ (MAXZIDE-25) 37.5-25 MG tablet 0.5 tablets daily         Allergies   Allergen Reactions     Erythromycin Nausea       Social History     Socioeconomic History     Marital status:      Spouse name: Not on file     Number of children: Not on file     Years of education: Not on file     Highest education level: Not on file   Occupational History     Not on file   Tobacco Use     Smoking status: Former Smoker     Smokeless tobacco: Never Used   Substance and Sexual Activity     Alcohol use: Not Currently     Drug use: Never     Sexual activity: Not on file   Other Topics Concern     Not on file   Social History Narrative     Not on file     Social Determinants of Health     Financial Resource Strain: Not on file   Food Insecurity: Not on file   Transportation Needs: Not on file   Physical Activity: Not on file   Stress: Not on file   Social Connections: Not on file   Intimate Partner Violence: Not on file   Housing Stability: Not on file       Family History   Problem Relation Age of Onset     Heart Failure Mother      Diabetes Father      Hypertension Father      Gout Brother      Cancer Sister         Lung mestatized       Objective:     Appears AAO x 3  No vitals obtained due to virtual visit

## 2022-05-19 ENCOUNTER — HOSPITAL ENCOUNTER (OUTPATIENT)
Dept: NUCLEAR MEDICINE | Facility: CLINIC | Age: 67
Discharge: HOME | End: 2022-05-19
Attending: PHYSICIAN ASSISTANT | Admitting: PHYSICIAN ASSISTANT
Payer: MEDICARE

## 2022-05-19 DIAGNOSIS — Q62.11 HYDRONEPHROSIS WITH URETEROPELVIC JUNCTION (UPJ) OBSTRUCTION: ICD-10-CM

## 2022-05-19 PROCEDURE — 343N000001 HC RX 343: Performed by: PHYSICIAN ASSISTANT

## 2022-05-19 PROCEDURE — G1004 CDSM NDSC: HCPCS

## 2022-05-19 PROCEDURE — 250N000011 HC RX IP 250 OP 636: Performed by: PHYSICIAN ASSISTANT

## 2022-05-19 PROCEDURE — A9562 TC99M MERTIATIDE: HCPCS | Performed by: PHYSICIAN ASSISTANT

## 2022-05-19 RX ORDER — FUROSEMIDE 10 MG/ML
40 INJECTION INTRAMUSCULAR; INTRAVENOUS ONCE
Status: COMPLETED | OUTPATIENT
Start: 2022-05-19 | End: 2022-05-19

## 2022-05-19 RX ADMIN — TECHNESCAN TC 99M MERTIATIDE 7.83 MILLICURIE: 1 INJECTION, POWDER, LYOPHILIZED, FOR SOLUTION INTRAVENOUS at 13:32

## 2022-05-19 RX ADMIN — FUROSEMIDE 40 MG: 10 INJECTION, SOLUTION INTRAMUSCULAR; INTRAVENOUS at 13:31

## 2022-05-20 ENCOUNTER — VIRTUAL VISIT (OUTPATIENT)
Dept: UROLOGY | Facility: CLINIC | Age: 67
End: 2022-05-20
Payer: MEDICARE

## 2022-05-20 DIAGNOSIS — N20.0 CALCULUS OF KIDNEY: Primary | ICD-10-CM

## 2022-05-20 DIAGNOSIS — Z87.440 PERSONAL HISTORY OF URINARY TRACT INFECTION: ICD-10-CM

## 2022-05-20 PROCEDURE — 99213 OFFICE O/P EST LOW 20 MIN: CPT | Mod: 95 | Performed by: PHYSICIAN ASSISTANT

## 2022-05-20 ASSESSMENT — PAIN SCALES - GENERAL: PAINLEVEL: NO PAIN (0)

## 2022-05-20 NOTE — PATIENT INSTRUCTIONS
Patient Stated Goal: Know what to expect after surgery  Ureteroscopy    Ureteroscopy is a procedure which is done for clearance of stones from the ureter, kidney or both. There are no incisions involved. The procedure involves your surgeon placing a small scope into your urethra. This is the opening where urine leaves your body.  The surgeon watches as they carefully guide the scope to the stone(s).  Modern flexible ureteroscopes can be used to reach virtually any location within the urinary tract.     The size, shape and location of the stone determines how best to treat the stone(s).  Whenever possible, stones are removed in one piece.  Larger stones need to be broken using a laser before removing in smaller pieces.  The goal is to remove all stones and stone fragments from that side of the body in a single treatment.  Complete stone clearance is an important step to prevent future kidney stone episodes.    Surgery:    Same day outpatient procedure    30-60 minutes    Procedure done in hospital surgical suite    General anesthesia (you will be asleep during the procedure)     Antibiotic prior to surgery to prevent infection    Physician will visit with you and respond to any questions or concerns and consent will be signed prior to going to the operating room    Risks:    Infection - Preoperative antibiotics should prevent new infections but it is possible that unanticipated bacteria may be introduced at time of surgery or that the stones were actually chronically infected before surgery      Injury - The ureter may be injured during this procedure.  This is most likely to happen if the ureter was very inflamed before surgery or if a stone is very tightly impacted.  The surgeon will not aggressively treat a stone if this creates a risk of injury.        Inaccessible Stones -A single procedure is effective in 95% of cases, but if your ureter is very narrow or your kidney stone is very impacted, a stent will be  placed and the procedure stopped.  In 1-2 weeks after the ureter has relaxed, the patient will be brought back to surgery and the procedure can be safely performed.      Incomplete stone clearance -Occasionally stone or stone fragments may not be completely cleared.  These may pass on their own, which may cause discomfort.  Our goal is to remove all possible stones and fragments.    Stent:      An internal soft tube will be placed between the kidney and the bladder while in surgery (after the stone is cleared). The stent will keep the kidney draining.    What should I expect?     It is common for a stent to cause some irritation and discomfort.   You may have:      The need to urinate suddenly     The need to urinate often     Pain during urination     A dull backache, which may get worse during urination     Blood stained urine (like fruit punch) and occasional small clots    It s important to remember the stent is necessary and only temporary. To feel more comfortable:      Drink more than you normally would but you do not have to constantly  flush your kidneys     Limiting your activity may decrease irritation or bleeding    Ibuprofen - 2 tablets every 6-8 hours     Use pain medications as directed.    When is the stent removed?    Most stents are removed within 5 days to 2 weeks after a procedure.     How is the stent removed?     Your stent will be removed in [facility] with a small telescope and a grasping tool.  It usually takes less than 1 minute to remove the stent.    What should I expect after the stent is removed?     You should feel normal by the next day    Some patients find:    An increase in back pain about an hour after the stent is removed as the kidney fills up with urine before it starts to empty.  It can be as uncomfortable as your initial stone episode.  Taking pain medications before stent removal may be helpful, but you would need someone else to drive you to and from your  appointment.    Bladder symptoms usually disappear by the next morning.    Small amounts of blood in the urine may be seen occasionally for up to a week.    Diet:      After surgery, there are no dietary restrictions - Drink to thirst, there is no need to increase intake of fluids, as this may increase nausea symptoms. Try to eat smaller, more frequent snacks, instead of large meals.    Activity:    Many people return to work within 1-2 days. Fatigue is normal for a couple of weeks following surgery. With increased activity you may experience more discomfort and you may notice more blood in your urine.      Post-Operative Symptom Control    While you recover from your procedure, you can take steps to ease your recovery.    Medications that prevent further episodes of severe pain and help stones pass: Take these as prescribed on a regular basis even if you are NOT in pain      Ibuprofen (Advil or Motrin) - Is available over the counter Take 2 (200mg) tablets every 6 hours until the stone passes.  o prevents spasm of the ureter.    o Decreases pain      Dramamine - (drowsy version, non-generic formulation) Is available over the counter and decreases spasm of the ureter.  Take 50mg at bedtime every night until the stone passes. In addition, take every 6 hours as needed.  Dramamine:  o Decreases nausea  o Decreases acute pain  o Decreases recurrence of pain for next 24 hours  o Will help you sleep        *This medication will cause increased drowsiness, do not drive or operate machinery for 6 hours      Flomax- Studies show that Flomax decreases irritation from stents.   o Take every day with food until stone passes even if you do not have pain  o Flomax does not relieve pain.        *This medication may cause nasal congestion or light-headedness      Detrol ( Tolterodine) - After surgery Detrol may decrease stent irritation and pelvic pain  o Take as prescribed     *This medication may cause dry mouth, constipation or  blurry vision. Stop medication if unable to urinate.    Medication that are taken as needed to manage break through symptoms: Take these ONLY as required and hopefully not at all      Narcotics (Percocet, Vicodin, Dilaudid)- take as prescribed for severe pain unrelieved by ibuprofen and dramamine  o Take as prescribed for severe pain  o Narcotics have significant side effects and only  cover-up  pain. They have no effect on cause of pain.  o Common side effects:  - Confusion, disorientation and sedation - DO NOT DRIVE OR OPERATE MACHINERY WITHIN 24 HOURS  - Nausea - take Dramamine or Zofran  or Haldol to help control  - Constipation  - Sleep disturbances      Ondansetron (Zofran)-  o Take as prescribed  o Reserve for severe nausea  o May cause constipation, start over the counter Miralax if needed to treat this    Haldol-  o Take as prescribed  o Reserve for severe nausea    Warning Signs/Symptoms - Please call the [facility] 24 hours a day at [phone] IMMEDIATELY if you experience any of these:    Fever greater than 100.1     Chills    Pain NOT CONTROLLED by pain medications    Heavy bleeding or large clots in urine (small clots can be normal)    Persistent nausea and/or vomiting    Post-Operative Follow up:    The stone(s) will be sent from surgery to a lab for composition analysis.  These results are usually available before a one month post-operative visit.  If you had laser treatment to break up your stone, you will usually be scheduled for a low dose CT scan prior to your one month appointment.  This scan allows your surgeon to confirm that all stone fragments were cleared at time of surgery and that there have been no complications.  These results along with possible labs and urine studies will help us develop an individualized plan to prevent new stones from forming and keep existing stones from enlarging.  This visit is usually scheduled about 1 month after your original surgery.    The  [facility] can respond to your questions or concerns 24 hours a day at [phone].

## 2022-05-27 ENCOUNTER — PREP FOR PROCEDURE (OUTPATIENT)
Dept: UROLOGY | Facility: CLINIC | Age: 67
End: 2022-05-27
Payer: MEDICARE

## 2022-05-27 DIAGNOSIS — N20.0 CALCULUS OF KIDNEY: Primary | ICD-10-CM

## 2022-05-27 DIAGNOSIS — Z87.440 PERSONAL HISTORY OF URINARY TRACT INFECTION: ICD-10-CM

## 2022-05-27 RX ORDER — KETOROLAC TROMETHAMINE 30 MG/ML
15 INJECTION, SOLUTION INTRAMUSCULAR; INTRAVENOUS
Status: CANCELLED | OUTPATIENT
Start: 2022-06-15

## 2022-05-27 RX ORDER — LEVOFLOXACIN 5 MG/ML
500 INJECTION, SOLUTION INTRAVENOUS
Status: CANCELLED | OUTPATIENT
Start: 2022-06-15

## 2022-05-27 RX ORDER — PIPERACILLIN SODIUM, TAZOBACTAM SODIUM 3; .375 G/15ML; G/15ML
3.38 INJECTION, POWDER, LYOPHILIZED, FOR SOLUTION INTRAVENOUS
Status: CANCELLED | OUTPATIENT
Start: 2022-06-15

## 2022-05-27 RX ORDER — ACETAMINOPHEN 325 MG/1
975 TABLET ORAL
Status: CANCELLED | OUTPATIENT
Start: 2022-06-15

## 2022-05-27 NOTE — PROGRESS NOTES
Given history of UTI, current left renal stone burden and remote history of pyeloplasty, surgical approach for stone clearance via percutaneous approach deemed best and recommended by Dr. Nowak.     Will schedule patient for surgery with plan for 1 week of preoperative antibiotic.

## 2022-05-31 ENCOUNTER — DOCUMENTATION ONLY (OUTPATIENT)
Dept: OTHER | Facility: CLINIC | Age: 67
End: 2022-05-31
Payer: MEDICARE

## 2022-05-31 DIAGNOSIS — N20.0 CALCULUS OF KIDNEY: Primary | ICD-10-CM

## 2022-05-31 RX ORDER — SULFAMETHOXAZOLE/TRIMETHOPRIM 800-160 MG
1 TABLET ORAL 2 TIMES DAILY
Qty: 14 TABLET | Refills: 0 | Status: SHIPPED | OUTPATIENT
Start: 2022-06-07 | End: 2022-06-03

## 2022-06-03 ENCOUNTER — OFFICE VISIT (OUTPATIENT)
Dept: FAMILY MEDICINE | Facility: CLINIC | Age: 67
End: 2022-06-03
Payer: MEDICARE

## 2022-06-03 VITALS
HEART RATE: 70 BPM | SYSTOLIC BLOOD PRESSURE: 125 MMHG | BODY MASS INDEX: 30.98 KG/M2 | DIASTOLIC BLOOD PRESSURE: 86 MMHG | TEMPERATURE: 97.9 F | WEIGHT: 174.9 LBS

## 2022-06-03 DIAGNOSIS — N20.0 CALCULUS OF KIDNEY: Primary | ICD-10-CM

## 2022-06-03 DIAGNOSIS — F32.0 MILD MAJOR DEPRESSION (H): ICD-10-CM

## 2022-06-03 DIAGNOSIS — J44.9 CHRONIC OBSTRUCTIVE PULMONARY DISEASE, UNSPECIFIED COPD TYPE (H): ICD-10-CM

## 2022-06-03 DIAGNOSIS — I10 PRIMARY HYPERTENSION: ICD-10-CM

## 2022-06-03 DIAGNOSIS — E78.2 MIXED HYPERLIPIDEMIA: ICD-10-CM

## 2022-06-03 PROBLEM — E66.811 CLASS 1 OBESITY DUE TO EXCESS CALORIES WITH SERIOUS COMORBIDITY AND BODY MASS INDEX (BMI) OF 30.0 TO 30.9 IN ADULT: Status: ACTIVE | Noted: 2022-04-25

## 2022-06-03 PROBLEM — E66.09 CLASS 1 OBESITY DUE TO EXCESS CALORIES WITH SERIOUS COMORBIDITY AND BODY MASS INDEX (BMI) OF 30.0 TO 30.9 IN ADULT: Status: ACTIVE | Noted: 2022-04-25

## 2022-06-03 LAB
ALBUMIN UR-MCNC: NEGATIVE MG/DL
ANION GAP SERPL CALCULATED.3IONS-SCNC: 4 MMOL/L (ref 3–14)
APPEARANCE UR: CLEAR
BILIRUB UR QL STRIP: NEGATIVE
BUN SERPL-MCNC: 22 MG/DL (ref 7–30)
CALCIUM SERPL-MCNC: 9.6 MG/DL (ref 8.5–10.1)
CHLORIDE BLD-SCNC: 103 MMOL/L (ref 94–109)
CO2 SERPL-SCNC: 31 MMOL/L (ref 20–32)
COLOR UR AUTO: YELLOW
CREAT SERPL-MCNC: 0.85 MG/DL (ref 0.52–1.04)
GFR SERPL CREATININE-BSD FRML MDRD: 74 ML/MIN/1.73M2
GLUCOSE BLD-MCNC: 113 MG/DL (ref 70–99)
GLUCOSE UR STRIP-MCNC: NEGATIVE MG/DL
HGB UR QL STRIP: NEGATIVE
KETONES UR STRIP-MCNC: NEGATIVE MG/DL
LEUKOCYTE ESTERASE UR QL STRIP: NEGATIVE
NITRATE UR QL: NEGATIVE
PH UR STRIP: 6.5 [PH] (ref 5–7)
POTASSIUM BLD-SCNC: 4 MMOL/L (ref 3.4–5.3)
SODIUM SERPL-SCNC: 138 MMOL/L (ref 133–144)
SP GR UR STRIP: 1.01 (ref 1–1.03)
UROBILINOGEN UR STRIP-ACNC: 0.2 E.U./DL

## 2022-06-03 PROCEDURE — 36415 COLL VENOUS BLD VENIPUNCTURE: CPT | Performed by: INTERNAL MEDICINE

## 2022-06-03 PROCEDURE — 81003 URINALYSIS AUTO W/O SCOPE: CPT | Mod: QW | Performed by: INTERNAL MEDICINE

## 2022-06-03 PROCEDURE — 80048 BASIC METABOLIC PNL TOTAL CA: CPT | Performed by: INTERNAL MEDICINE

## 2022-06-03 PROCEDURE — 93000 ELECTROCARDIOGRAM COMPLETE: CPT | Performed by: INTERNAL MEDICINE

## 2022-06-03 PROCEDURE — 99213 OFFICE O/P EST LOW 20 MIN: CPT | Performed by: INTERNAL MEDICINE

## 2022-06-03 RX ORDER — CYANOCOBALAMIN (VITAMIN B-12) 500 MCG
400 LOZENGE ORAL DAILY
COMMUNITY

## 2022-06-03 RX ORDER — MULTIVITAMIN WITH IRON
100 TABLET ORAL DAILY
COMMUNITY

## 2022-06-03 RX ORDER — MULTIVIT-MIN/IRON/FOLIC ACID/K 18-600-40
500 CAPSULE ORAL DAILY
COMMUNITY

## 2022-06-03 RX ORDER — CHOLECALCIFEROL (VITAMIN D3) 50 MCG
3 TABLET ORAL DAILY
COMMUNITY

## 2022-06-03 ASSESSMENT — ENCOUNTER SYMPTOMS
HEADACHES: 0
BRUISES/BLEEDS EASILY: 0
COUGH: 0
FLANK PAIN: 0
CHILLS: 0
DYSURIA: 0
MYALGIAS: 0
PALPITATIONS: 0
ABDOMINAL PAIN: 0
UNEXPECTED WEIGHT CHANGE: 0
SHORTNESS OF BREATH: 0
NERVOUS/ANXIOUS: 0
SLEEP DISTURBANCE: 1
FEVER: 0
FREQUENCY: 0
ARTHRALGIAS: 1
RHINORRHEA: 0
HEMATURIA: 0
FATIGUE: 1

## 2022-06-03 ASSESSMENT — PATIENT HEALTH QUESTIONNAIRE - PHQ9
10. IF YOU CHECKED OFF ANY PROBLEMS, HOW DIFFICULT HAVE THESE PROBLEMS MADE IT FOR YOU TO DO YOUR WORK, TAKE CARE OF THINGS AT HOME, OR GET ALONG WITH OTHER PEOPLE: NOT DIFFICULT AT ALL
SUM OF ALL RESPONSES TO PHQ QUESTIONS 1-9: 5
SUM OF ALL RESPONSES TO PHQ QUESTIONS 1-9: 5

## 2022-06-03 NOTE — H&P (VIEW-ONLY)
[address] [address]  Phone: [phone]  Fax: [phone]  Primary Provider: Rico Barrera  Pre-op Performing Provider: RICO BARRERA      PREOPERATIVE EVALUATION:  Today's date: 6/3/2022    Pamela Shelton is a 66 year old female who presents for a preoperative evaluation.    Surgical Information:  Surgery/Procedure: Kidney Stone Removal   Surgery Location: [facility]   Surgeon: Dr. Sergio Nowak  Surgery Date: 6/15/22  Time of Surgery: 730 am   Where patient plans to recover: At home with family  Fax number for surgical facility: Note does not need to be faxed, will be available electronically in Epic.        Type of Anesthesia Anticipated: General    Assessment & Plan     The proposed surgical procedure is considered LOW risk.    Problem List Items Addressed This Visit        Respiratory    Chronic obstructive pulmonary disease (H)       Endocrine    Hyperlipidemia       Circulatory    Primary hypertension    Relevant Orders    EKG 12-lead complete w/read - Clinics (Completed)    UA macro with reflex to Microscopic and Culture - [facility]inc Collect    Basic metabolic panel  (Ca, Cl, CO2, Creat, Gluc, K, Na, BUN)       Urinary    Calculus of kidney - Primary    Relevant Orders    EKG 12-lead complete w/read - Clinics (Completed)    UA macro with reflex to Microscopic and Culture - [facility] Collect    Basic metabolic panel  (Ca, Cl, CO2, Creat, Gluc, K, Na, BUN)       Behavioral    Mild major depression (H)      Patient was controlled hypertension and COPD.  No history of cardiac disease.  Obese with a BMI of just over 30.  At risk for sleep apnea.    Possible Sleep Apnea: At risk.          Risks and Recommendations:  The patient has the following additional risks and recommendations for perioperative complications:   - At risk for significant sleep apnea.    Medication Instructions:  Hold Meds on the AM of surgery.    RECOMMENDATION:  APPROVAL GIVEN  to proceed with proposed procedure, without further diagnostic evaluation.            Subjective     HPI related to upcoming procedure: Percutaneous stone surgery    Preop Questions 6/3/2022   1. Have you ever had a heart attack or stroke? No   2. Have you ever had surgery on your heart or blood vessels, such as a stent placement, a coronary artery bypass, or surgery on an artery in your head, neck, heart, or legs? No   3. Do you have chest pain with activity? No   4. Do you have a history of  heart failure? No   5. Do you currently have a cold, bronchitis or symptoms of other infection? No   6. Do you have a cough, shortness of breath, or wheezing? YES - COPD   7. Do you or anyone in your family have previous history of blood clots? UNKNOWN - none known   8. Do you or does anyone in your family have a serious bleeding problem such as prolonged bleeding following surgeries or cuts? No   9. Have you ever had problems with anemia or been told to take iron pills? No   10. Have you had any abnormal blood loss such as black, tarry or bloody stools, or abnormal vaginal bleeding? No   11. Have you ever had a blood transfusion? UNKNOWN - none known   12. Are you willing to have a blood transfusion if it is medically needed before, during, or after your surgery? Yes   13. Have you or any of your relatives ever had problems with anesthesia? UNKNOWN - none known   14. Do you have sleep apnea, excessive snoring or daytime drowsiness? YES - snores?   14a. Do you have a CPAP machine? No   15. Do you have any artifical heart valves or other implanted medical devices like a pacemaker, defibrillator, or continuous glucose monitor? No   16. Do you have artificial joints? No   17. Are you allergic to latex? No       Health Care Directive:  Patient has a Health Care Directive on file              Review of Systems   Constitutional: Positive for fatigue. Negative for chills, fever and unexpected weight change.   HENT: Negative for  congestion and rhinorrhea.    Eyes: Positive for visual disturbance.   Respiratory: Negative for cough and shortness of breath.    Cardiovascular: Negative for chest pain, palpitations and leg swelling.   Gastrointestinal: Negative for abdominal pain.   Genitourinary: Negative for dysuria, flank pain, frequency and hematuria.   Musculoskeletal: Positive for arthralgias. Negative for myalgias.   Neurological: Negative for headaches.   Hematological: Does not bruise/bleed easily.   Psychiatric/Behavioral: Positive for sleep disturbance. The patient is not nervous/anxious.          Patient Active Problem List    Diagnosis Date Noted     Atypical squamous cells of undetermined significance (ASCUS) on Papanicolaou smear of cervix 04/25/2022     Priority: Medium     Back pain 04/25/2022     Priority: Medium     Calculus of kidney 04/25/2022     Priority: Medium     Chronic rhinitis 04/25/2022     Priority: Medium     Eczema 04/25/2022     Priority: Medium     Seborrheic dermatitis 04/25/2022     Priority: Medium     Hyperlipidemia 04/25/2022     Priority: Medium     Impaired glucose tolerance 04/25/2022     Priority: Medium     Myofascial pain 04/25/2022     Priority: Medium     Class 1 obesity due to excess calories with serious comorbidity and body mass index (BMI) of 30.0 to 30.9 in adult 04/25/2022     Priority: Medium     Primary hypertension 04/25/2022     Priority: Medium     Acute biliary pancreatitis 04/14/2018     Priority: Medium     Cholelithiasis 04/14/2018     Priority: Medium     Chronic obstructive pulmonary disease (H) 02/03/2014     Priority: Medium     Mild major depression (H) 09/17/2012     Priority: Medium      Past Medical History:   Diagnosis Date     Kidney stone 04/2022     Past Surgical History:   Procedure Laterality Date     APPENDECTOMY  04/05/1984     CHOLECYSTECTOMY  04/15/2018     ENDOSCOPIC RETROGRADE CHOLANGIOPANCREATOGRAPHY  04/16/2018     KIDNEY SURGERY      1972     Current Outpatient  Medications   Medication Sig Dispense Refill     albuterol (PROAIR HFA/PROVENTIL HFA/VENTOLIN HFA) 108 (90 Base) MCG/ACT inhaler Inhale 1-2 puffs into the lungs       Ascorbic Acid (VITAMIN C) 500 MG CAPS        fish oil-omega-3 fatty acids 1000 MG capsule        FLUoxetine (PROZAC) 10 MG capsule TAKE ONE CAPSULE BY MOUTH TWICE DAILY 180 capsule 0     fluticasone (FLONASE) 50 MCG/ACT nasal spray 1 spray daily       simvastatin (ZOCOR) 40 MG tablet Take 40 mg by mouth       triamterene-HCTZ (MAXZIDE-25) 37.5-25 MG tablet 0.5 tablets daily       vitamin B6 (PYRIDOXINE) 100 MG tablet Take 100 mg by mouth daily       vitamin D3 (CHOLECALCIFEROL) 50 mcg (2000 units) tablet Take 1 tablet by mouth 2 times daily       vitamin E 400 units TABS Take 400 Units by mouth daily         Allergies   Allergen Reactions     Erythromycin Nausea        Social History     Tobacco Use     Smoking status: Former Smoker     Smokeless tobacco: Never Used   Substance Use Topics     Alcohol use: Not Currently     Family History   Problem Relation Age of Onset     Heart Failure Mother      Arthritis Mother      Bleeding Disorder Mother      Bronchitis Mother      Depression Mother      Hyperlipidemia Mother      Obesity Mother      Sleep Apnea Mother      Uterine Cancer Mother      Diabetes Father      Hypertension Father      Bleeding Disorder Father      Bronchitis Father      Diabetes Type 1 Father      Malignant Hypertension Father      Sleep Apnea Father      Cancer Sister         Lung mestatized     Lung Cancer Sister      Hypertension Sister      Gout Brother      Hypertension Brother      History   Drug Use Unknown         Objective     /86 (BP Location: Right arm)   Pulse 70   Temp 97.9  F (36.6  C)   Wt 79.3 kg (174 lb 14.4 oz)   BMI 30.98 kg/m      Physical Exam  Patient appears comfortable.  HEENT exam is unremarkable.  Mallampati 1.  Neck supple no adenopathy or thyromegaly.  Chest clear to auscultation and percussion  with good air movement.  Cardiac exam regular no murmur.  No edema.  Normal carotid and posterior tibial pulsations.  Abdomen soft and nontender.  No CVA tenderness.  Left flank scar.  Alert and oriented and intact neurologically.  In good spirits.        Diagnostics:  Labs pending at this time.  Results will be reviewed when available.   EKG: Sinus 66, Q in V2 possible old septal MI    Revised Cardiac Risk Index (RCRI):  The patient has the following serious cardiovascular risks for perioperative complications:   - No serious cardiac risks = 0 points     RCRI Interpretation: 0 points: Class I (very low risk - 0.4% complication rate)           Signed Electronically by: Mark Barrera MD  Copy of this evaluation report is provided to requesting physician.      Answers for HPI/ROS submitted by the patient on 6/3/2022  If you checked off any problems, how difficult have these problems made it for you to do your work, take care of things at home, or get along with other people?: Not difficult at all  PHQ9 TOTAL SCORE: 5

## 2022-06-03 NOTE — PROGRESS NOTES
[address] [address]  Phone: [phone]  Fax: [phone]  Primary Provider: Rico Barrera  Pre-op Performing Provider: RICO BARRERA      PREOPERATIVE EVALUATION:  Today's date: 6/3/2022    Pamela Shelton is a 66 year old female who presents for a preoperative evaluation.    Surgical Information:  Surgery/Procedure: Kidney Stone Removal   Surgery Location: [facility]   Surgeon: Dr. Sergio Nowak  Surgery Date: 6/15/22  Time of Surgery: 730 am   Where patient plans to recover: At home with family  Fax number for surgical facility: Note does not need to be faxed, will be available electronically in Epic.        Type of Anesthesia Anticipated: General    Assessment & Plan     The proposed surgical procedure is considered LOW risk.    Problem List Items Addressed This Visit        Respiratory    Chronic obstructive pulmonary disease (H)       Endocrine    Hyperlipidemia       Circulatory    Primary hypertension    Relevant Orders    EKG 12-lead complete w/read - Clinics (Completed)    UA macro with reflex to Microscopic and Culture - Clinc Collect    Basic metabolic panel  (Ca, Cl, CO2, Creat, Gluc, K, Na, BUN)       Urinary    Calculus of kidney - Primary    Relevant Orders    EKG 12-lead complete w/read - Clinics (Completed)    UA macro with reflex to Microscopic and Culture - Clinc Collect    Basic metabolic panel  (Ca, Cl, CO2, Creat, Gluc, K, Na, BUN)       Behavioral    Mild major depression (H)      Patient was controlled hypertension and COPD.  No history of cardiac disease.  Obese with a BMI of just over 30.  At risk for sleep apnea.    Possible Sleep Apnea: At risk.          Risks and Recommendations:  The patient has the following additional risks and recommendations for perioperative complications:   - At risk for significant sleep apnea.    Medication Instructions:  Hold Meds on the AM of surgery.    RECOMMENDATION:  APPROVAL GIVEN  to proceed with proposed procedure, without further diagnostic evaluation.            Subjective     HPI related to upcoming procedure: Percutaneous stone surgery    Preop Questions 6/3/2022   1. Have you ever had a heart attack or stroke? No   2. Have you ever had surgery on your heart or blood vessels, such as a stent placement, a coronary artery bypass, or surgery on an artery in your head, neck, heart, or legs? No   3. Do you have chest pain with activity? No   4. Do you have a history of  heart failure? No   5. Do you currently have a cold, bronchitis or symptoms of other infection? No   6. Do you have a cough, shortness of breath, or wheezing? YES - COPD   7. Do you or anyone in your family have previous history of blood clots? UNKNOWN - none known   8. Do you or does anyone in your family have a serious bleeding problem such as prolonged bleeding following surgeries or cuts? No   9. Have you ever had problems with anemia or been told to take iron pills? No   10. Have you had any abnormal blood loss such as black, tarry or bloody stools, or abnormal vaginal bleeding? No   11. Have you ever had a blood transfusion? UNKNOWN - none known   12. Are you willing to have a blood transfusion if it is medically needed before, during, or after your surgery? Yes   13. Have you or any of your relatives ever had problems with anesthesia? UNKNOWN - none known   14. Do you have sleep apnea, excessive snoring or daytime drowsiness? YES - snores?   14a. Do you have a CPAP machine? No   15. Do you have any artifical heart valves or other implanted medical devices like a pacemaker, defibrillator, or continuous glucose monitor? No   16. Do you have artificial joints? No   17. Are you allergic to latex? No       Health Care Directive:  Patient has a Health Care Directive on file              Review of Systems   Constitutional: Positive for fatigue. Negative for chills, fever and unexpected weight change.   HENT: Negative for  congestion and rhinorrhea.    Eyes: Positive for visual disturbance.   Respiratory: Negative for cough and shortness of breath.    Cardiovascular: Negative for chest pain, palpitations and leg swelling.   Gastrointestinal: Negative for abdominal pain.   Genitourinary: Negative for dysuria, flank pain, frequency and hematuria.   Musculoskeletal: Positive for arthralgias. Negative for myalgias.   Neurological: Negative for headaches.   Hematological: Does not bruise/bleed easily.   Psychiatric/Behavioral: Positive for sleep disturbance. The patient is not nervous/anxious.          Patient Active Problem List    Diagnosis Date Noted     Atypical squamous cells of undetermined significance (ASCUS) on Papanicolaou smear of cervix 04/25/2022     Priority: Medium     Back pain 04/25/2022     Priority: Medium     Calculus of kidney 04/25/2022     Priority: Medium     Chronic rhinitis 04/25/2022     Priority: Medium     Eczema 04/25/2022     Priority: Medium     Seborrheic dermatitis 04/25/2022     Priority: Medium     Hyperlipidemia 04/25/2022     Priority: Medium     Impaired glucose tolerance 04/25/2022     Priority: Medium     Myofascial pain 04/25/2022     Priority: Medium     Class 1 obesity due to excess calories with serious comorbidity and body mass index (BMI) of 30.0 to 30.9 in adult 04/25/2022     Priority: Medium     Primary hypertension 04/25/2022     Priority: Medium     Acute biliary pancreatitis 04/14/2018     Priority: Medium     Cholelithiasis 04/14/2018     Priority: Medium     Chronic obstructive pulmonary disease (H) 02/03/2014     Priority: Medium     Mild major depression (H) 09/17/2012     Priority: Medium      Past Medical History:   Diagnosis Date     Kidney stone 04/2022     Past Surgical History:   Procedure Laterality Date     APPENDECTOMY  04/05/1984     CHOLECYSTECTOMY  04/15/2018     ENDOSCOPIC RETROGRADE CHOLANGIOPANCREATOGRAPHY  04/16/2018     KIDNEY SURGERY      1972     Current Outpatient  Medications   Medication Sig Dispense Refill     albuterol (PROAIR HFA/PROVENTIL HFA/VENTOLIN HFA) 108 (90 Base) MCG/ACT inhaler Inhale 1-2 puffs into the lungs       Ascorbic Acid (VITAMIN C) 500 MG CAPS        fish oil-omega-3 fatty acids 1000 MG capsule        FLUoxetine (PROZAC) 10 MG capsule TAKE ONE CAPSULE BY MOUTH TWICE DAILY 180 capsule 0     fluticasone (FLONASE) 50 MCG/ACT nasal spray 1 spray daily       simvastatin (ZOCOR) 40 MG tablet Take 40 mg by mouth       triamterene-HCTZ (MAXZIDE-25) 37.5-25 MG tablet 0.5 tablets daily       vitamin B6 (PYRIDOXINE) 100 MG tablet Take 100 mg by mouth daily       vitamin D3 (CHOLECALCIFEROL) 50 mcg (2000 units) tablet Take 1 tablet by mouth 2 times daily       vitamin E 400 units TABS Take 400 Units by mouth daily         Allergies   Allergen Reactions     Erythromycin Nausea        Social History     Tobacco Use     Smoking status: Former Smoker     Smokeless tobacco: Never Used   Substance Use Topics     Alcohol use: Not Currently     Family History   Problem Relation Age of Onset     Heart Failure Mother      Arthritis Mother      Bleeding Disorder Mother      Bronchitis Mother      Depression Mother      Hyperlipidemia Mother      Obesity Mother      Sleep Apnea Mother      Uterine Cancer Mother      Diabetes Father      Hypertension Father      Bleeding Disorder Father      Bronchitis Father      Diabetes Type 1 Father      Malignant Hypertension Father      Sleep Apnea Father      Cancer Sister         Lung mestatized     Lung Cancer Sister      Hypertension Sister      Gout Brother      Hypertension Brother      History   Drug Use Unknown         Objective     /86 (BP Location: Right arm)   Pulse 70   Temp 97.9  F (36.6  C)   Wt 79.3 kg (174 lb 14.4 oz)   BMI 30.98 kg/m      Physical Exam  Patient appears comfortable.  HEENT exam is unremarkable.  Mallampati 1.  Neck supple no adenopathy or thyromegaly.  Chest clear to auscultation and percussion  with good air movement.  Cardiac exam regular no murmur.  No edema.  Normal carotid and posterior tibial pulsations.  Abdomen soft and nontender.  No CVA tenderness.  Left flank scar.  Alert and oriented and intact neurologically.  In good spirits.        Diagnostics:  Labs pending at this time.  Results will be reviewed when available.   EKG: Sinus 66, Q in V2 possible old septal MI    Revised Cardiac Risk Index (RCRI):  The patient has the following serious cardiovascular risks for perioperative complications:   - No serious cardiac risks = 0 points     RCRI Interpretation: 0 points: Class I (very low risk - 0.4% complication rate)           Signed Electronically by: Mark Barrera MD  Copy of this evaluation report is provided to requesting physician.      Answers for HPI/ROS submitted by the patient on 6/3/2022  If you checked off any problems, how difficult have these problems made it for you to do your work, take care of things at home, or get along with other people?: Not difficult at all  PHQ9 TOTAL SCORE: 5

## 2022-06-03 NOTE — LETTER
June 6, 2022      Pamela Shelton  [address] [facility] [address]        Dear ,    We are writing to inform you of your test results.     Normal.       Resulted Orders   UA macro with reflex to Microscopic and Culture - Clinc Collect   Result Value Ref Range    Color Urine Yellow Colorless, Straw, Light Yellow, Yellow    Appearance Urine Clear Clear    Glucose Urine Negative Negative mg/dL    Bilirubin Urine Negative Negative    Ketones Urine Negative Negative mg/dL    Specific Gravity Urine 1.010 1.003 - 1.035    Blood Urine Negative Negative    pH Urine 6.5 5.0 - 7.0    Protein Albumin Urine Negative Negative mg/dL    Urobilinogen Urine 0.2 0.2, 1.0 E.U./dL    Nitrite Urine Negative Negative    Leukocyte Esterase Urine Negative Negative    Narrative    Microscopic not indicated   Basic metabolic panel  (Ca, Cl, CO2, Creat, Gluc, K, Na, BUN)   Result Value Ref Range    Sodium 138 133 - 144 mmol/L    Potassium 4.0 3.4 - 5.3 mmol/L    Chloride 103 94 - 109 mmol/L    Carbon Dioxide (CO2) 31 20 - 32 mmol/L    Anion Gap 4 3 - 14 mmol/L    Urea Nitrogen 22 7 - 30 mg/dL    Creatinine 0.85 0.52 - 1.04 mg/dL    Calcium 9.6 8.5 - 10.1 mg/dL    Glucose 113 (H) 70 - 99 mg/dL    GFR Estimate 74 >60 mL/min/1.73m2      Comment:      Effective December 21, 2021 eGFRcr in adults is calculated using the 2021 CKD-EPI creatinine equation which includes age and gender (Butch et al., NEJM, DOI: 10.1056/PEKLys8322192)       If you have any questions or concerns, please call the clinic at the number listed above.       Sincerely,      Mark Barrera MD

## 2022-06-07 ENCOUNTER — TELEPHONE (OUTPATIENT)
Dept: FAMILY MEDICINE | Facility: CLINIC | Age: 67
End: 2022-06-07
Payer: MEDICARE

## 2022-06-07 NOTE — TELEPHONE ENCOUNTER
Reason for Call:  Other prescription    Detailed comments: Wondering with the antibiotic if she can have wine     Phone Number Patient can be reached at:     Best Time:     Can we leave a detailed message on this number?     Call taken on 6/7/2022 at 11:10 AM by Ximena Taylor

## 2022-06-07 NOTE — TELEPHONE ENCOUNTER
Pt stated she has started taking Sulfamethozole-Trimethoprim for an upcoming surgery. Pt is asking if she can drink wine and take a probiotic while taking this medication?  Drug interaction  utilized; no interactions. Pt given this information.

## 2022-06-13 ENCOUNTER — LAB (OUTPATIENT)
Dept: LAB | Facility: CLINIC | Age: 67
End: 2022-06-13
Payer: MEDICARE

## 2022-06-13 DIAGNOSIS — N20.0 CALCULUS OF KIDNEY: ICD-10-CM

## 2022-06-13 LAB — SARS-COV-2 RNA RESP QL NAA+PROBE: NEGATIVE

## 2022-06-13 PROCEDURE — U0005 INFEC AGEN DETEC AMPLI PROBE: HCPCS

## 2022-06-13 PROCEDURE — U0003 INFECTIOUS AGENT DETECTION BY NUCLEIC ACID (DNA OR RNA); SEVERE ACUTE RESPIRATORY SYNDROME CORONAVIRUS 2 (SARS-COV-2) (CORONAVIRUS DISEASE [COVID-19]), AMPLIFIED PROBE TECHNIQUE, MAKING USE OF HIGH THROUGHPUT TECHNOLOGIES AS DESCRIBED BY CMS-2020-01-R: HCPCS

## 2022-06-14 ENCOUNTER — ANESTHESIA EVENT (OUTPATIENT)
Dept: SURGERY | Facility: HOSPITAL | Age: 67
DRG: 660 | End: 2022-06-14
Payer: MEDICARE

## 2022-06-15 ENCOUNTER — HOSPITAL ENCOUNTER (OUTPATIENT)
Dept: INTERVENTIONAL RADIOLOGY/VASCULAR | Facility: HOSPITAL | Age: 67
Discharge: HOME | DRG: 660 | End: 2022-06-15
Attending: UROLOGY | Admitting: UROLOGY
Payer: MEDICARE

## 2022-06-15 ENCOUNTER — APPOINTMENT (OUTPATIENT)
Dept: RADIOLOGY | Facility: HOSPITAL | Age: 67
DRG: 660 | End: 2022-06-15
Attending: UROLOGY
Payer: MEDICARE

## 2022-06-15 ENCOUNTER — ANESTHESIA (OUTPATIENT)
Dept: SURGERY | Facility: HOSPITAL | Age: 67
DRG: 660 | End: 2022-06-15
Payer: MEDICARE

## 2022-06-15 ENCOUNTER — HOSPITAL ENCOUNTER (INPATIENT)
Facility: HOSPITAL | Age: 67
LOS: 2 days | Discharge: HOME | DRG: 660 | End: 2022-06-19
Attending: UROLOGY | Admitting: UROLOGY
Payer: MEDICARE

## 2022-06-15 DIAGNOSIS — Z87.440 HISTORY OF URINARY TRACT INFECTION: ICD-10-CM

## 2022-06-15 DIAGNOSIS — N20.0 KIDNEY STONE: Primary | ICD-10-CM

## 2022-06-15 LAB
ABO/RH(D): NORMAL
ANION GAP SERPL CALCULATED.3IONS-SCNC: 8 MMOL/L (ref 5–18)
ANTIBODY SCREEN: NEGATIVE
BUN SERPL-MCNC: 21 MG/DL (ref 8–22)
CALCIUM SERPL-MCNC: 9.7 MG/DL (ref 8.5–10.5)
CHLORIDE BLD-SCNC: 103 MMOL/L (ref 98–107)
CO2 SERPL-SCNC: 28 MMOL/L (ref 22–31)
CREAT SERPL-MCNC: 0.89 MG/DL (ref 0.6–1.1)
ERYTHROCYTE [DISTWIDTH] IN BLOOD BY AUTOMATED COUNT: 13.5 % (ref 10–15)
GFR SERPL CREATININE-BSD FRML MDRD: 70 ML/MIN/1.73M2
GLUCOSE BLD-MCNC: 120 MG/DL (ref 70–125)
HCT VFR BLD AUTO: 42 % (ref 35–47)
HGB BLD-MCNC: 13.6 G/DL (ref 11.7–15.7)
INR PPP: 1.02 (ref 0.85–1.15)
MCH RBC QN AUTO: 29.9 PG (ref 26.5–33)
MCHC RBC AUTO-ENTMCNC: 32.4 G/DL (ref 31.5–36.5)
MCV RBC AUTO: 92 FL (ref 78–100)
PLATELET # BLD AUTO: 207 10E3/UL (ref 150–450)
POTASSIUM BLD-SCNC: 3.3 MMOL/L (ref 3.5–5)
RBC # BLD AUTO: 4.55 10E6/UL (ref 3.8–5.2)
SODIUM SERPL-SCNC: 139 MMOL/L (ref 136–145)
SPECIMEN EXPIRATION DATE: NORMAL
WBC # BLD AUTO: 6 10E3/UL (ref 4–11)

## 2022-06-15 PROCEDURE — C1725 CATH, TRANSLUMIN NON-LASER: HCPCS | Performed by: UROLOGY

## 2022-06-15 PROCEDURE — 36415 COLL VENOUS BLD VENIPUNCTURE: CPT | Performed by: PHYSICIAN ASSISTANT

## 2022-06-15 PROCEDURE — 250N000011 HC RX IP 250 OP 636

## 2022-06-15 PROCEDURE — C1769 GUIDE WIRE: HCPCS

## 2022-06-15 PROCEDURE — C1729 CATH, DRAINAGE: HCPCS | Performed by: UROLOGY

## 2022-06-15 PROCEDURE — 50081 PERQ NL/PL LITHOTRP CPLX>2CM: CPT | Mod: LT | Performed by: UROLOGY

## 2022-06-15 PROCEDURE — 272N000001 HC OR GENERAL SUPPLY STERILE: Performed by: UROLOGY

## 2022-06-15 PROCEDURE — C1894 INTRO/SHEATH, NON-LASER: HCPCS | Performed by: UROLOGY

## 2022-06-15 PROCEDURE — 86850 RBC ANTIBODY SCREEN: CPT | Performed by: PHYSICIAN ASSISTANT

## 2022-06-15 PROCEDURE — C1773 RET DEV, INSERTABLE: HCPCS

## 2022-06-15 PROCEDURE — 258N000003 HC RX IP 258 OP 636: Performed by: ANESTHESIOLOGY

## 2022-06-15 PROCEDURE — 250N000011 HC RX IP 250 OP 636: Performed by: PHYSICIAN ASSISTANT

## 2022-06-15 PROCEDURE — 370N000017 HC ANESTHESIA TECHNICAL FEE, PER MIN: Performed by: UROLOGY

## 2022-06-15 PROCEDURE — 258N000003 HC RX IP 258 OP 636: Performed by: UROLOGY

## 2022-06-15 PROCEDURE — 0T778DZ DILATION OF LEFT URETER WITH INTRALUMINAL DEVICE, VIA NATURAL OR ARTIFICIAL OPENING ENDOSCOPIC: ICD-10-PCS | Performed by: UROLOGY

## 2022-06-15 PROCEDURE — 85027 COMPLETE CBC AUTOMATED: CPT | Performed by: PHYSICIAN ASSISTANT

## 2022-06-15 PROCEDURE — 82365 CALCULUS SPECTROSCOPY: CPT | Performed by: UROLOGY

## 2022-06-15 PROCEDURE — 250N000011 HC RX IP 250 OP 636: Performed by: ANESTHESIOLOGY

## 2022-06-15 PROCEDURE — 360N000084 HC SURGERY LEVEL 4 W/ FLUORO, PER MIN: Performed by: UROLOGY

## 2022-06-15 PROCEDURE — 86901 BLOOD TYPING SEROLOGIC RH(D): CPT | Performed by: PHYSICIAN ASSISTANT

## 2022-06-15 PROCEDURE — 258N000003 HC RX IP 258 OP 636

## 2022-06-15 PROCEDURE — 250N000013 HC RX MED GY IP 250 OP 250 PS 637: Performed by: UROLOGY

## 2022-06-15 PROCEDURE — 255N000002 HC RX 255 OP 636: Performed by: UROLOGY

## 2022-06-15 PROCEDURE — 710N000009 HC RECOVERY PHASE 1, LEVEL 1, PER MIN: Performed by: UROLOGY

## 2022-06-15 PROCEDURE — 50706 BALLOON DILATE URTRL STRIX: CPT

## 2022-06-15 PROCEDURE — 71046 X-RAY EXAM CHEST 2 VIEWS: CPT

## 2022-06-15 PROCEDURE — 999N000141 HC STATISTIC PRE-PROCEDURE NURSING ASSESSMENT: Performed by: UROLOGY

## 2022-06-15 PROCEDURE — 82310 ASSAY OF CALCIUM: CPT | Performed by: PHYSICIAN ASSISTANT

## 2022-06-15 PROCEDURE — C1887 CATHETER, GUIDING: HCPCS | Performed by: UROLOGY

## 2022-06-15 PROCEDURE — 250N000025 HC SEVOFLURANE, PER MIN: Performed by: UROLOGY

## 2022-06-15 PROCEDURE — 999N000180 XR SURGERY CARM FLUORO LESS THAN 5 MIN

## 2022-06-15 PROCEDURE — 85610 PROTHROMBIN TIME: CPT | Performed by: PHYSICIAN ASSISTANT

## 2022-06-15 PROCEDURE — P9041 ALBUMIN (HUMAN),5%, 50ML: HCPCS

## 2022-06-15 PROCEDURE — 250N000009 HC RX 250

## 2022-06-15 PROCEDURE — C2617 STENT, NON-COR, TEM W/O DEL: HCPCS | Performed by: UROLOGY

## 2022-06-15 PROCEDURE — 0TC13ZZ EXTIRPATION OF MATTER FROM LEFT KIDNEY, PERCUTANEOUS APPROACH: ICD-10-PCS | Performed by: UROLOGY

## 2022-06-15 PROCEDURE — 258N000001 HC RX 258: Performed by: UROLOGY

## 2022-06-15 PROCEDURE — 86923 COMPATIBILITY TEST ELECTRIC: CPT | Performed by: UROLOGY

## 2022-06-15 PROCEDURE — 250N000013 HC RX MED GY IP 250 OP 250 PS 637: Performed by: PHYSICIAN ASSISTANT

## 2022-06-15 PROCEDURE — C1769 GUIDE WIRE: HCPCS | Performed by: UROLOGY

## 2022-06-15 DEVICE — IMPLANTABLE DEVICE: Type: IMPLANTABLE DEVICE | Site: URETER | Status: FUNCTIONAL

## 2022-06-15 RX ORDER — HYDROMORPHONE HYDROCHLORIDE 1 MG/ML
0.4 INJECTION, SOLUTION INTRAMUSCULAR; INTRAVENOUS; SUBCUTANEOUS EVERY 5 MIN PRN
Status: DISCONTINUED | OUTPATIENT
Start: 2022-06-15 | End: 2022-06-15 | Stop reason: HOSPADM

## 2022-06-15 RX ORDER — EPHEDRINE SULFATE 50 MG/ML
INJECTION, SOLUTION INTRAMUSCULAR; INTRAVENOUS; SUBCUTANEOUS PRN
Status: DISCONTINUED | OUTPATIENT
Start: 2022-06-15 | End: 2022-06-15

## 2022-06-15 RX ORDER — PROCHLORPERAZINE MALEATE 5 MG
5 TABLET ORAL EVERY 6 HOURS PRN
Status: DISCONTINUED | OUTPATIENT
Start: 2022-06-15 | End: 2022-06-19 | Stop reason: HOSPADM

## 2022-06-15 RX ORDER — LIDOCAINE 40 MG/G
CREAM TOPICAL
Status: DISCONTINUED | OUTPATIENT
Start: 2022-06-15 | End: 2022-06-19 | Stop reason: HOSPADM

## 2022-06-15 RX ORDER — POLYETHYLENE GLYCOL 3350 17 G/17G
17 POWDER, FOR SOLUTION ORAL DAILY
Status: DISCONTINUED | OUTPATIENT
Start: 2022-06-16 | End: 2022-06-19 | Stop reason: HOSPADM

## 2022-06-15 RX ORDER — FENTANYL CITRATE 50 UG/ML
50 INJECTION, SOLUTION INTRAMUSCULAR; INTRAVENOUS
Status: DISCONTINUED | OUTPATIENT
Start: 2022-06-15 | End: 2022-06-15 | Stop reason: HOSPADM

## 2022-06-15 RX ORDER — HALOPERIDOL 5 MG/ML
1 INJECTION INTRAMUSCULAR
Status: DISCONTINUED | OUTPATIENT
Start: 2022-06-15 | End: 2022-06-15 | Stop reason: HOSPADM

## 2022-06-15 RX ORDER — ONDANSETRON 4 MG/1
4 TABLET, ORALLY DISINTEGRATING ORAL EVERY 6 HOURS PRN
Status: DISCONTINUED | OUTPATIENT
Start: 2022-06-15 | End: 2022-06-19 | Stop reason: HOSPADM

## 2022-06-15 RX ORDER — KETOROLAC TROMETHAMINE 30 MG/ML
15 INJECTION, SOLUTION INTRAMUSCULAR; INTRAVENOUS
Status: DISCONTINUED | OUTPATIENT
Start: 2022-06-15 | End: 2022-06-19 | Stop reason: HOSPADM

## 2022-06-15 RX ORDER — LEVOFLOXACIN 5 MG/ML
500 INJECTION, SOLUTION INTRAVENOUS
Status: COMPLETED | OUTPATIENT
Start: 2022-06-15 | End: 2022-06-15

## 2022-06-15 RX ORDER — ACETAMINOPHEN 325 MG/1
650 TABLET ORAL EVERY 4 HOURS PRN
Status: DISCONTINUED | OUTPATIENT
Start: 2022-06-18 | End: 2022-06-19 | Stop reason: HOSPADM

## 2022-06-15 RX ORDER — FENTANYL CITRATE 50 UG/ML
INJECTION, SOLUTION INTRAMUSCULAR; INTRAVENOUS PRN
Status: DISCONTINUED | OUTPATIENT
Start: 2022-06-15 | End: 2022-06-15

## 2022-06-15 RX ORDER — OXYCODONE HYDROCHLORIDE 5 MG/1
10 TABLET ORAL EVERY 4 HOURS PRN
Status: DISCONTINUED | OUTPATIENT
Start: 2022-06-15 | End: 2022-06-19 | Stop reason: HOSPADM

## 2022-06-15 RX ORDER — NALOXONE HYDROCHLORIDE 0.4 MG/ML
0.2 INJECTION, SOLUTION INTRAMUSCULAR; INTRAVENOUS; SUBCUTANEOUS
Status: DISCONTINUED | OUTPATIENT
Start: 2022-06-15 | End: 2022-06-19 | Stop reason: HOSPADM

## 2022-06-15 RX ORDER — HYDROMORPHONE HCL IN WATER/PF 6 MG/30 ML
0.4 PATIENT CONTROLLED ANALGESIA SYRINGE INTRAVENOUS
Status: DISCONTINUED | OUTPATIENT
Start: 2022-06-15 | End: 2022-06-19 | Stop reason: HOSPADM

## 2022-06-15 RX ORDER — IBUPROFEN 200 MG
600 TABLET ORAL EVERY 6 HOURS PRN
COMMUNITY

## 2022-06-15 RX ORDER — ONDANSETRON 2 MG/ML
4 INJECTION INTRAMUSCULAR; INTRAVENOUS EVERY 6 HOURS PRN
Status: DISCONTINUED | OUTPATIENT
Start: 2022-06-15 | End: 2022-06-19 | Stop reason: HOSPADM

## 2022-06-15 RX ORDER — NALOXONE HYDROCHLORIDE 0.4 MG/ML
0.4 INJECTION, SOLUTION INTRAMUSCULAR; INTRAVENOUS; SUBCUTANEOUS
Status: DISCONTINUED | OUTPATIENT
Start: 2022-06-15 | End: 2022-06-19 | Stop reason: HOSPADM

## 2022-06-15 RX ORDER — PIPERACILLIN SODIUM, TAZOBACTAM SODIUM 3; .375 G/15ML; G/15ML
3.38 INJECTION, POWDER, LYOPHILIZED, FOR SOLUTION INTRAVENOUS
Status: COMPLETED | OUTPATIENT
Start: 2022-06-15 | End: 2022-06-15

## 2022-06-15 RX ORDER — BISACODYL 10 MG
10 SUPPOSITORY, RECTAL RECTAL DAILY PRN
Status: DISCONTINUED | OUTPATIENT
Start: 2022-06-15 | End: 2022-06-19 | Stop reason: HOSPADM

## 2022-06-15 RX ORDER — ONDANSETRON 2 MG/ML
4 INJECTION INTRAMUSCULAR; INTRAVENOUS EVERY 30 MIN PRN
Status: DISCONTINUED | OUTPATIENT
Start: 2022-06-15 | End: 2022-06-15 | Stop reason: HOSPADM

## 2022-06-15 RX ORDER — ONDANSETRON 2 MG/ML
INJECTION INTRAMUSCULAR; INTRAVENOUS PRN
Status: DISCONTINUED | OUTPATIENT
Start: 2022-06-15 | End: 2022-06-15

## 2022-06-15 RX ORDER — PROPOFOL 10 MG/ML
INJECTION, EMULSION INTRAVENOUS PRN
Status: DISCONTINUED | OUTPATIENT
Start: 2022-06-15 | End: 2022-06-15

## 2022-06-15 RX ORDER — DEXAMETHASONE SODIUM PHOSPHATE 10 MG/ML
INJECTION, SOLUTION INTRAMUSCULAR; INTRAVENOUS PRN
Status: DISCONTINUED | OUTPATIENT
Start: 2022-06-15 | End: 2022-06-15

## 2022-06-15 RX ORDER — SODIUM CHLORIDE, SODIUM LACTATE, POTASSIUM CHLORIDE, CALCIUM CHLORIDE 600; 310; 30; 20 MG/100ML; MG/100ML; MG/100ML; MG/100ML
INJECTION, SOLUTION INTRAVENOUS CONTINUOUS
Status: DISCONTINUED | OUTPATIENT
Start: 2022-06-15 | End: 2022-06-15 | Stop reason: HOSPADM

## 2022-06-15 RX ORDER — PROPOFOL 10 MG/ML
INJECTION, EMULSION INTRAVENOUS CONTINUOUS PRN
Status: DISCONTINUED | OUTPATIENT
Start: 2022-06-15 | End: 2022-06-15

## 2022-06-15 RX ORDER — ACETAMINOPHEN 325 MG/1
975 TABLET ORAL
Status: COMPLETED | OUTPATIENT
Start: 2022-06-15 | End: 2022-06-15

## 2022-06-15 RX ORDER — OXYCODONE HYDROCHLORIDE 5 MG/1
5 TABLET ORAL EVERY 4 HOURS PRN
Status: DISCONTINUED | OUTPATIENT
Start: 2022-06-15 | End: 2022-06-19 | Stop reason: HOSPADM

## 2022-06-15 RX ORDER — HYDROMORPHONE HCL IN WATER/PF 6 MG/30 ML
0.2 PATIENT CONTROLLED ANALGESIA SYRINGE INTRAVENOUS
Status: DISCONTINUED | OUTPATIENT
Start: 2022-06-15 | End: 2022-06-19 | Stop reason: HOSPADM

## 2022-06-15 RX ORDER — LORATADINE 10 MG/1
10 TABLET ORAL DAILY PRN
COMMUNITY

## 2022-06-15 RX ORDER — ONDANSETRON 4 MG/1
4 TABLET, ORALLY DISINTEGRATING ORAL EVERY 30 MIN PRN
Status: DISCONTINUED | OUTPATIENT
Start: 2022-06-15 | End: 2022-06-15 | Stop reason: HOSPADM

## 2022-06-15 RX ORDER — MEPERIDINE HYDROCHLORIDE 25 MG/ML
12.5 INJECTION INTRAMUSCULAR; INTRAVENOUS; SUBCUTANEOUS
Status: DISCONTINUED | OUTPATIENT
Start: 2022-06-15 | End: 2022-06-15 | Stop reason: HOSPADM

## 2022-06-15 RX ORDER — ACETAMINOPHEN 325 MG/1
975 TABLET ORAL EVERY 8 HOURS
Status: DISPENSED | OUTPATIENT
Start: 2022-06-15 | End: 2022-06-18

## 2022-06-15 RX ORDER — OXYCODONE HYDROCHLORIDE 5 MG/1
5 TABLET ORAL EVERY 4 HOURS PRN
Status: DISCONTINUED | OUTPATIENT
Start: 2022-06-15 | End: 2022-06-15 | Stop reason: HOSPADM

## 2022-06-15 RX ORDER — DIPHENHYDRAMINE HCL 25 MG
25 TABLET ORAL
COMMUNITY

## 2022-06-15 RX ORDER — LIDOCAINE HYDROCHLORIDE 10 MG/ML
INJECTION, SOLUTION INFILTRATION; PERINEURAL PRN
Status: DISCONTINUED | OUTPATIENT
Start: 2022-06-15 | End: 2022-06-15

## 2022-06-15 RX ORDER — ALBUMIN, HUMAN INJ 5% 5 %
SOLUTION INTRAVENOUS CONTINUOUS PRN
Status: DISCONTINUED | OUTPATIENT
Start: 2022-06-15 | End: 2022-06-15

## 2022-06-15 RX ORDER — AMOXICILLIN 250 MG
1 CAPSULE ORAL 2 TIMES DAILY
Status: DISCONTINUED | OUTPATIENT
Start: 2022-06-15 | End: 2022-06-19 | Stop reason: HOSPADM

## 2022-06-15 RX ORDER — SODIUM CHLORIDE 9 MG/ML
INJECTION, SOLUTION INTRAVENOUS CONTINUOUS
Status: DISCONTINUED | OUTPATIENT
Start: 2022-06-15 | End: 2022-06-17

## 2022-06-15 RX ORDER — FENTANYL CITRATE 50 UG/ML
50 INJECTION, SOLUTION INTRAMUSCULAR; INTRAVENOUS EVERY 5 MIN PRN
Status: DISCONTINUED | OUTPATIENT
Start: 2022-06-15 | End: 2022-06-15 | Stop reason: HOSPADM

## 2022-06-15 RX ADMIN — PHENYLEPHRINE HYDROCHLORIDE 100 MCG: 10 INJECTION INTRAVENOUS at 08:31

## 2022-06-15 RX ADMIN — FENTANYL CITRATE 100 MCG: 50 INJECTION, SOLUTION INTRAMUSCULAR; INTRAVENOUS at 07:38

## 2022-06-15 RX ADMIN — ACETAMINOPHEN 975 MG: 325 TABLET ORAL at 06:11

## 2022-06-15 RX ADMIN — Medication 5 MG: at 07:52

## 2022-06-15 RX ADMIN — PIPERACILLIN AND TAZOBACTAM 3.38 G: 3; .375 INJECTION, POWDER, FOR SOLUTION INTRAVENOUS at 07:44

## 2022-06-15 RX ADMIN — PROPOFOL 25 MCG/KG/MIN: 10 INJECTION, EMULSION INTRAVENOUS at 08:09

## 2022-06-15 RX ADMIN — LEVOFLOXACIN 500 MG: 5 INJECTION, SOLUTION INTRAVENOUS at 06:07

## 2022-06-15 RX ADMIN — Medication 5 MG: at 07:49

## 2022-06-15 RX ADMIN — PHENYLEPHRINE HYDROCHLORIDE 100 MCG: 10 INJECTION INTRAVENOUS at 09:16

## 2022-06-15 RX ADMIN — PROPOFOL 120 MG: 10 INJECTION, EMULSION INTRAVENOUS at 07:38

## 2022-06-15 RX ADMIN — PHENYLEPHRINE HYDROCHLORIDE 50 MCG: 10 INJECTION INTRAVENOUS at 08:19

## 2022-06-15 RX ADMIN — ROCURONIUM BROMIDE 50 MG: 50 INJECTION, SOLUTION INTRAVENOUS at 07:38

## 2022-06-15 RX ADMIN — FENTANYL CITRATE 50 MCG: 50 INJECTION, SOLUTION INTRAMUSCULAR; INTRAVENOUS at 10:51

## 2022-06-15 RX ADMIN — Medication 5 MG: at 08:09

## 2022-06-15 RX ADMIN — DEXAMETHASONE SODIUM PHOSPHATE 10 MG: 10 INJECTION, SOLUTION INTRAMUSCULAR; INTRAVENOUS at 07:38

## 2022-06-15 RX ADMIN — KETOROLAC TROMETHAMINE 15 MG: 30 INJECTION, SOLUTION INTRAMUSCULAR at 06:12

## 2022-06-15 RX ADMIN — FENTANYL CITRATE 50 MCG: 50 INJECTION, SOLUTION INTRAMUSCULAR; INTRAVENOUS at 11:01

## 2022-06-15 RX ADMIN — SODIUM CHLORIDE: 9 INJECTION, SOLUTION INTRAVENOUS at 14:16

## 2022-06-15 RX ADMIN — Medication 5 MG: at 08:19

## 2022-06-15 RX ADMIN — SODIUM CHLORIDE, POTASSIUM CHLORIDE, SODIUM LACTATE AND CALCIUM CHLORIDE: 600; 310; 30; 20 INJECTION, SOLUTION INTRAVENOUS at 06:07

## 2022-06-15 RX ADMIN — SODIUM CHLORIDE: 9 INJECTION, SOLUTION INTRAVENOUS at 22:38

## 2022-06-15 RX ADMIN — SENNOSIDES AND DOCUSATE SODIUM 1 TABLET: 8.6; 5 TABLET ORAL at 22:38

## 2022-06-15 RX ADMIN — PHENYLEPHRINE HYDROCHLORIDE 200 MCG: 10 INJECTION INTRAVENOUS at 09:50

## 2022-06-15 RX ADMIN — PHENYLEPHRINE HYDROCHLORIDE 100 MCG: 10 INJECTION INTRAVENOUS at 09:26

## 2022-06-15 RX ADMIN — LIDOCAINE HYDROCHLORIDE 50 MG: 10 INJECTION, SOLUTION INFILTRATION; PERINEURAL at 07:38

## 2022-06-15 RX ADMIN — SODIUM CHLORIDE, POTASSIUM CHLORIDE, SODIUM LACTATE AND CALCIUM CHLORIDE: 600; 310; 30; 20 INJECTION, SOLUTION INTRAVENOUS at 09:03

## 2022-06-15 RX ADMIN — PHENYLEPHRINE HYDROCHLORIDE 100 MCG: 10 INJECTION INTRAVENOUS at 08:37

## 2022-06-15 RX ADMIN — PHENYLEPHRINE HYDROCHLORIDE 100 MCG: 10 INJECTION INTRAVENOUS at 08:46

## 2022-06-15 RX ADMIN — PHENYLEPHRINE HYDROCHLORIDE 100 MCG: 10 INJECTION INTRAVENOUS at 07:57

## 2022-06-15 RX ADMIN — HYDROMORPHONE HYDROCHLORIDE 0.4 MG: 1 INJECTION, SOLUTION INTRAMUSCULAR; INTRAVENOUS; SUBCUTANEOUS at 13:18

## 2022-06-15 RX ADMIN — ALBUMIN HUMAN: 0.05 INJECTION, SOLUTION INTRAVENOUS at 08:48

## 2022-06-15 RX ADMIN — PROPOFOL 30 MG: 10 INJECTION, EMULSION INTRAVENOUS at 07:40

## 2022-06-15 RX ADMIN — PHENYLEPHRINE HYDROCHLORIDE 100 MCG: 10 INJECTION INTRAVENOUS at 08:09

## 2022-06-15 RX ADMIN — PHENYLEPHRINE HYDROCHLORIDE 100 MCG: 10 INJECTION INTRAVENOUS at 09:04

## 2022-06-15 RX ADMIN — PHENYLEPHRINE HYDROCHLORIDE 100 MCG: 10 INJECTION INTRAVENOUS at 09:42

## 2022-06-15 RX ADMIN — Medication 5 MG: at 07:57

## 2022-06-15 RX ADMIN — ACETAMINOPHEN 975 MG: 325 TABLET, FILM COATED ORAL at 22:38

## 2022-06-15 RX ADMIN — SUGAMMADEX 160 MG: 100 INJECTION, SOLUTION INTRAVENOUS at 09:54

## 2022-06-15 RX ADMIN — ROCURONIUM BROMIDE 10 MG: 50 INJECTION, SOLUTION INTRAVENOUS at 08:50

## 2022-06-15 RX ADMIN — ONDANSETRON 4 MG: 2 INJECTION INTRAMUSCULAR; INTRAVENOUS at 09:41

## 2022-06-15 ASSESSMENT — COPD QUESTIONNAIRES: COPD: 1

## 2022-06-15 ASSESSMENT — LIFESTYLE VARIABLES: TOBACCO_USE: 1

## 2022-06-15 NOTE — ANESTHESIA CARE TRANSFER NOTE
Patient: Pamela Shelton    Procedure: Procedure(s):  NEPHROLITHOTOMY, PERCUTANEOUS AND STENT INSERTION WITH HOLIUM LASER LITHOTRIPSY       Diagnosis: Calculus of kidney [N20.0]  Personal history of urinary tract infection [Z87.440]  Diagnosis Additional Information: No value filed.    Anesthesia Type:   General     Note:    Oropharynx: oropharynx clear of all foreign objects  Level of Consciousness: awake  Oxygen Supplementation: face mask  Level of Supplemental Oxygen (L/min / FiO2): 8  Independent Airway: airway patency satisfactory and stable  Dentition: dentition unchanged  Vital Signs Stable: post-procedure vital signs reviewed and stable  Report to RN Given: handoff report given  Patient transferred to: PACU  Comments: Patient transferred to PACU by CRNA. Report given to PACU RN, all questions answered. Patient hemodynamically stable. CRNA ensured PACU RN was comfortable taking over care.  Handoff Report: Identifed the Patient, Identified the Reponsible Provider, Reviewed the pertinent medical history, Discussed the surgical course, Reviewed Intra-OP anesthesia mangement and issues during anesthesia, Set expectations for post-procedure period and Allowed opportunity for questions and acknowledgement of understanding      Vitals:  Vitals Value Taken Time   /74 06/15/22 1007   Temp 36.6  C (97.8  F) 06/15/22 1006   Pulse 75 06/15/22 1011   Resp 23 06/15/22 1011   SpO2 100 % 06/15/22 1011   Vitals shown include unvalidated device data.    Electronically Signed By: GERA Brown CRNA  Linda 15, 2022  10:12 AM

## 2022-06-15 NOTE — ANESTHESIA POSTPROCEDURE EVALUATION
Patient: Pamela Shelton    Procedure: Procedure(s):  NEPHROLITHOTOMY, PERCUTANEOUS AND STENT INSERTION WITH HOLIUM LASER LITHOTRIPSY       Anesthesia Type:  General    Note:  Disposition: Admission   Postop Pain Control: Uneventful            Sign Out: Well controlled pain   PONV: No   Neuro/Psych: Uneventful            Sign Out: Acceptable/Baseline neuro status   Airway/Respiratory: Uneventful            Sign Out: Acceptable/Baseline resp. status   CV/Hemodynamics: Uneventful            Sign Out: Acceptable CV status; No obvious hypovolemia; No obvious fluid overload   Other NRE: NONE   DID A NON-ROUTINE EVENT OCCUR? No           Last vitals:  Vitals Value Taken Time   BP 92/55 06/15/22 1330   Temp 36.8  C (98.3  F) 06/15/22 1330   Pulse 80 06/15/22 1347   Resp 15 06/15/22 1346   SpO2 99 % 06/15/22 1347   Vitals shown include unvalidated device data.    Electronically Signed By: Dave Horne MD  Linda 15, 2022  2:46 PM

## 2022-06-15 NOTE — ANESTHESIA PREPROCEDURE EVALUATION
Anesthesia Pre-Procedure Evaluation    Patient: Pamela Shelton   MRN: [id number] : 1955        Procedure : Procedure(s):  NEPHROLITHOTOMY, PERCUTANEOUS AND STENT INSERTION          Past Medical History:   Diagnosis Date     Acute biliary pancreatitis 2018     Chronic rhinitis      COPD (chronic obstructive pulmonary disease) (H)      Depression      Eczema      Hyperlipidemia LDL goal <100      Hypertension      Kidney stone 2022     Papanicolaou smear of cervix with atypical squamous cells of undetermined significance (ASC-US)       Past Surgical History:   Procedure Laterality Date     APPENDECTOMY  1984     CHOLECYSTECTOMY  04/15/2018     ENDOSCOPIC RETROGRADE CHOLANGIOPANCREATOGRAPHY  2018     KIDNEY SURGERY      1972      Allergies   Allergen Reactions     Erythromycin Nausea      Social History     Tobacco Use     Smoking status: Former Smoker     Packs/day: 1.00     Years: 40.00     Pack years: 40.00     Quit date:      Years since quittin.4     Smokeless tobacco: Never Used   Substance Use Topics     Alcohol use: Not Currently      Wt Readings from Last 1 Encounters:   06/15/22 77.3 kg (170 lb 6.4 oz)        Anesthesia Evaluation   Pt has had prior anesthetic.     No history of anesthetic complications       ROS/MED HX  ENT/Pulmonary:     (+) tobacco use, Past use, COPD,     Neurologic:  - neg neurologic ROS     Cardiovascular:     (+) Dyslipidemia hypertension-----    METS/Exercise Tolerance: >4 METS    Hematologic:  - neg hematologic  ROS     Musculoskeletal:  - neg musculoskeletal ROS     GI/Hepatic:  - neg GI/hepatic ROS     Renal/Genitourinary:     (+) Nephrolithiasis ,     Endo:     (+) Obesity (BMI 32),     Psychiatric/Substance Use:     (+) psychiatric history depression     Infectious Disease:       Malignancy:       Other:            Physical Exam    Airway        Mallampati: I   TM distance: > 3 FB   Neck ROM: full   Mouth opening: > 3 cm    Respiratory Devices and  Support         Dental  no notable dental history         Cardiovascular   cardiovascular exam normal          Pulmonary   pulmonary exam normal                OUTSIDE LABS:  CBC:   Lab Results   Component Value Date    WBC 6.0 06/15/2022    HGB 13.6 06/15/2022    HCT 42.0 06/15/2022     06/15/2022     BMP:   Lab Results   Component Value Date     06/15/2022     06/03/2022    POTASSIUM 3.3 (L) 06/15/2022    POTASSIUM 4.0 06/03/2022    CHLORIDE 103 06/15/2022    CHLORIDE 103 06/03/2022    CO2 28 06/15/2022    CO2 31 06/03/2022    BUN 21 06/15/2022    BUN 22 06/03/2022    CR 0.89 06/15/2022    CR 0.85 06/03/2022     06/15/2022     (H) 06/03/2022     COAGS:   Lab Results   Component Value Date    INR 1.02 06/15/2022     POC: No results found for: BGM, HCG, HCGS  HEPATIC: No results found for: ALBUMIN, PROTTOTAL, ALT, AST, GGT, ALKPHOS, BILITOTAL, BILIDIRECT, ANDREA  OTHER:   Lab Results   Component Value Date    JAHAIRA 9.7 06/15/2022       Anesthesia Plan    ASA Status:  3      Anesthesia Type: General.     - Airway: ETT   Induction: Intravenous.   Maintenance: Balanced.        Consents    Anesthesia Plan(s) and associated risks, benefits, and realistic alternatives discussed. Questions answered and patient/representative(s) expressed understanding.     - Discussed: Risks, Benefits and Alternatives for BOTH SEDATION and the PROCEDURE were discussed     - Discussed with:  Patient      - Extended Intubation/Ventilatory Support Discussed: No.      - Patient is DNR/DNI Status: No    Use of blood products discussed: Yes.     - Discussed with: Patient.     - Consented: consented to blood products            Reason for refusal: other.     Postoperative Care    Pain management: Multi-modal analgesia.   PONV prophylaxis: Ondansetron (or other 5HT-3), Background Propofol Infusion, Dexamethasone or Solumedrol     Comments:    Other Comments: Decadron     Reverse with Sugammadex    PIV x 2             Dave Horne MD

## 2022-06-15 NOTE — OP NOTE
[facility]  Kidney Stone [place] Operative Note    Pamela Shelton   Linda 15, 2022   6/15/2022   Mark Barrera     Procedure Performed  Procedure(s):  NEPHROLITHOTOMY, PERCUTANEOUS AND STENT INSERTION WITH HOLIUM LASER LITHOTRIPSY  1. Cystoscopy  2. Percutaneous Nephrolithotomy (>2 cm) - Left Kidney        Pre-operative Diagnosis  Calculus of kidney [N20.0]  Personal history of urinary tract infection [Z87.440]    Post-operative Diagnosis  1. Stone Left Kidney        Surgeon(s) and Role:     * Sergio Nowak MD - Primary     * Derrick Soler MD - Assisting    Anesthesia Type  Not documented     Procedural Summary  Increased procedural complexity because of densely impacted stone requiring 2 times expected time to perform procedure  Estimation of stone clearance: <2 mm residual  Subjective stone composition: calcium  Renal papillae assessment: minimal  Unanticipated event/findings: none  Post-operative plan: return to clinic in 1 week for stent removal    Narrative    Challenging clearance of two renal stones (each 1.5 cm) one of which was severely impacted within calyx. Eventually successfully cleared through combination of laser lithotripsy and ultrasonic lithotripsy.    Procedural Details    Patient is brought to the surgical suite where anesthesia is induced. she is prepped and draped in standard fashion in frogleg position.    Cystoscopy: Flexible cystoscopy is performed. Introitus is normal. Bladder is normal..     Ureteral Access: Left ureteral access is initiated with Bentson wire. Guidewire is passed to left kidney followed by JB1 vascular catheter.      Ramsay catheter is inserted and patient is transferred to prone position.    Percutaneous Nephrolithotomy: Dr Soler attempts direct percutaneous access to the densely impacted midpole stone but this is unsuccessful. Upper pole 30 F subcostal access is eventually achieved. Initially I approach the impacted midpole stone with  flexible nephroscope and holmium laser the the stone is refractory to laser energy. I then am able to extract the mobile lower pole stone intact by aligning along long axis. I discover that I am able to adequately visualize the impacted midpole stone with rigid nephroscope with near maximal scope angling. Fortuitously, the exceptionally dense midpole stone is responsive to ultrasonic lithotripsy and is able to be completely cleared after fragmentation.    High magnification fluoroscopy does not reveal any residual fragments.    An 8F 24 cmantegrade ureteral stent is inserted y Dr Soler and D-stat is infiltrated in the tract.    The patient was then taken to the recovery room in good condition.     Past Medical History:   Diagnosis Date     Acute biliary pancreatitis 2018     Chronic rhinitis      COPD (chronic obstructive pulmonary disease) (H)      Depression      Eczema      Hyperlipidemia LDL goal <100      Hypertension      Kidney stone 04/2022     Papanicolaou smear of cervix with atypical squamous cells of undetermined significance (ASC-US)         Patient Active Problem List   Diagnosis     Acute biliary pancreatitis     Atypical squamous cells of undetermined significance (ASCUS) on Papanicolaou smear of cervix     Back pain     Calculus of kidney     Cholelithiasis     Chronic obstructive pulmonary disease (H)     Chronic rhinitis     Eczema     Seborrheic dermatitis     Hyperlipidemia     Impaired glucose tolerance     Mild major depression (H)     Myofascial pain     Class 1 obesity due to excess calories with serious comorbidity and body mass index (BMI) of 30.0 to 30.9 in adult     Primary hypertension        Estimated Blood Loss  .300 mL     ID Type Source Tests Collected by Time Destination   A : LEFT KIDNEY STONE Calculus/Stone Kidney, Left STONE ANALYSIS Sergio Nowak MD 6/15/2022  9:40 AM

## 2022-06-15 NOTE — PHARMACY-ADMISSION MEDICATION HISTORY
Pharmacy Note - Admission Medication History     ______________________________________________________________________    Prior To Admission (PTA) med list completed and updated in EMR.       PTA Med List   Medication Sig Last Dose     albuterol (PROAIR HFA/PROVENTIL HFA/VENTOLIN HFA) 108 (90 Base) MCG/ACT inhaler Inhale 1-2 puffs into the lungs 4 times daily as needed 6/14/2022     Ascorbic Acid (VITAMIN C) 500 MG CAPS Take 500 mg by mouth daily Past Week     diphenhydrAMINE (BENADRYL) 25 MG tablet Take 25 mg by mouth nightly as needed for itching or allergies Past Month     fish oil-omega-3 fatty acids 1000 MG capsule Take 1 g by mouth daily Past Week     FLUoxetine (PROZAC) 10 MG capsule TAKE ONE CAPSULE BY MOUTH TWICE DAILY 6/15/2022 at am     fluticasone (FLONASE) 50 MCG/ACT nasal spray Spray 2 sprays into both nostrils daily 6/15/2022     ibuprofen (ADVIL/MOTRIN) 200 MG tablet Take 600 mg by mouth every 6 hours as needed for mild pain 6/13/2022     loratadine (CLARITIN) 10 MG tablet Take 10 mg by mouth daily as needed for allergies Past Week     simvastatin (ZOCOR) 40 MG tablet Take 40 mg by mouth At Bedtime 6/14/2022     triamterene-HCTZ (MAXZIDE-25) 37.5-25 MG tablet Take 0.5 tablets by mouth daily 6/14/2022     vitamin B6 (PYRIDOXINE) 100 MG tablet Take 100 mg by mouth daily Past Week     vitamin D3 (CHOLECALCIFEROL) 50 mcg (2000 units) tablet Take 3 tablets by mouth daily Past Week     vitamin E 400 units TABS Take 400 Units by mouth daily Past Week       Information source(s): Patient and Clinic records    Method of interview communication: in-person    Patient was asked about OTC/herbal products specifically.  PTA med list reflects this.    Based on the pharmacist's assessment, the PTA med list information appears reliable    Allergies were reviewed, assessed, and updated with the patient.      Medications available for use during hospital stay: albuterol. Patient did not bring Flonase but does not  anticipate needing.     Thank you for the opportunity to participate in the care of this patient.      Derrick Miguel [facility]     6/15/2022     6:28 AM

## 2022-06-15 NOTE — PLAN OF CARE
Pt was able to transfer herself from the zoom cart to her hospital bed after arriving on unit. She rated her pain at a 2/10 and had recently received IV dilaudid in PACU. Pt reported this was tolerable for her.  Dressing to left flank has a small amount of drainage present. Drainage marked by PACU.

## 2022-06-15 NOTE — ANESTHESIA PROCEDURE NOTES
Airway       Patient location during procedure: OR       Procedure Start/Stop Times: 6/15/2022 7:44 AM  Staff -        CRNA: Jin Peter APRN CRNA       Performed By: CRNA  Consent for Airway        Urgency: elective  Indications and Patient Condition       Indications for airway management: estevan-procedural       Induction type:intravenous       Mask difficulty assessment: 1 - vent by mask    Final Airway Details       Final airway type: endotracheal airway       Successful airway: ETT - single and Oral  Endotracheal Airway Details        ETT size (mm): 7.0       Cuffed: yes       Successful intubation technique: direct laryngoscopy       DL Blade Type: Enriquez 2       Grade View of Cords: 2       Adjucts: stylet and tooth guard       Position: Right       Measured from: gums/teeth       Secured at (cm): 21       Bite block used: None    Post intubation assessment        Placement verified by: capnometry, equal breath sounds and chest rise        Number of attempts at approach: 1       Number of other approaches attempted: 0       Secured with: silk tape       Ease of procedure: easy       Dentition: Intact and Unchanged    Medication(s) Administered   Medication Administration Time: 6/15/2022 7:44 AM

## 2022-06-16 ENCOUNTER — APPOINTMENT (OUTPATIENT)
Dept: CT IMAGING | Facility: HOSPITAL | Age: 67
DRG: 660 | End: 2022-06-16
Attending: PHYSICIAN ASSISTANT
Payer: MEDICARE

## 2022-06-16 ENCOUNTER — APPOINTMENT (OUTPATIENT)
Dept: RADIOLOGY | Facility: HOSPITAL | Age: 67
DRG: 660 | End: 2022-06-16
Attending: UROLOGY
Payer: MEDICARE

## 2022-06-16 LAB
ANION GAP SERPL CALCULATED.3IONS-SCNC: 3 MMOL/L (ref 5–18)
BUN SERPL-MCNC: 19 MG/DL (ref 8–22)
CALCIUM SERPL-MCNC: 8.7 MG/DL (ref 8.5–10.5)
CHLORIDE BLD-SCNC: 109 MMOL/L (ref 98–107)
CO2 SERPL-SCNC: 28 MMOL/L (ref 22–31)
CREAT SERPL-MCNC: 1.23 MG/DL (ref 0.6–1.1)
ERYTHROCYTE [DISTWIDTH] IN BLOOD BY AUTOMATED COUNT: 13.7 % (ref 10–15)
GFR SERPL CREATININE-BSD FRML MDRD: 48 ML/MIN/1.73M2
GLUCOSE BLD-MCNC: 93 MG/DL (ref 70–125)
GLUCOSE BLDC GLUCOMTR-MCNC: 80 MG/DL (ref 70–99)
HCT VFR BLD AUTO: 27 % (ref 35–47)
HGB BLD-MCNC: 7.5 G/DL (ref 11.7–15.7)
HGB BLD-MCNC: 8.5 G/DL (ref 11.7–15.7)
HGB BLD-MCNC: 8.8 G/DL (ref 11.7–15.7)
MCH RBC QN AUTO: 31 PG (ref 26.5–33)
MCHC RBC AUTO-ENTMCNC: 32.6 G/DL (ref 31.5–36.5)
MCV RBC AUTO: 95 FL (ref 78–100)
PLATELET # BLD AUTO: 146 10E3/UL (ref 150–450)
POTASSIUM BLD-SCNC: 4.1 MMOL/L (ref 3.5–5)
RBC # BLD AUTO: 2.84 10E6/UL (ref 3.8–5.2)
SODIUM SERPL-SCNC: 140 MMOL/L (ref 136–145)
WBC # BLD AUTO: 11 10E3/UL (ref 4–11)

## 2022-06-16 PROCEDURE — 85018 HEMOGLOBIN: CPT | Performed by: UROLOGY

## 2022-06-16 PROCEDURE — 250N000013 HC RX MED GY IP 250 OP 250 PS 637: Performed by: UROLOGY

## 2022-06-16 PROCEDURE — 258N000003 HC RX IP 258 OP 636: Performed by: UROLOGY

## 2022-06-16 PROCEDURE — 36415 COLL VENOUS BLD VENIPUNCTURE: CPT | Performed by: UROLOGY

## 2022-06-16 PROCEDURE — 82962 GLUCOSE BLOOD TEST: CPT

## 2022-06-16 PROCEDURE — 71046 X-RAY EXAM CHEST 2 VIEWS: CPT

## 2022-06-16 PROCEDURE — 80048 BASIC METABOLIC PNL TOTAL CA: CPT | Performed by: UROLOGY

## 2022-06-16 PROCEDURE — 85027 COMPLETE CBC AUTOMATED: CPT | Performed by: UROLOGY

## 2022-06-16 PROCEDURE — 74176 CT ABD & PELVIS W/O CONTRAST: CPT

## 2022-06-16 RX ADMIN — ACETAMINOPHEN 975 MG: 325 TABLET, FILM COATED ORAL at 13:45

## 2022-06-16 RX ADMIN — ACETAMINOPHEN 975 MG: 325 TABLET, FILM COATED ORAL at 22:31

## 2022-06-16 RX ADMIN — SENNOSIDES AND DOCUSATE SODIUM 1 TABLET: 8.6; 5 TABLET ORAL at 21:18

## 2022-06-16 RX ADMIN — SENNOSIDES AND DOCUSATE SODIUM 1 TABLET: 8.6; 5 TABLET ORAL at 10:37

## 2022-06-16 RX ADMIN — SODIUM CHLORIDE: 9 INJECTION, SOLUTION INTRAVENOUS at 22:32

## 2022-06-16 RX ADMIN — ACETAMINOPHEN 975 MG: 325 TABLET, FILM COATED ORAL at 06:22

## 2022-06-16 RX ADMIN — POLYETHYLENE GLYCOL 3350 17 G: 17 POWDER, FOR SOLUTION ORAL at 10:37

## 2022-06-16 RX ADMIN — SODIUM CHLORIDE: 9 INJECTION, SOLUTION INTRAVENOUS at 06:22

## 2022-06-16 RX ADMIN — SODIUM CHLORIDE: 9 INJECTION, SOLUTION INTRAVENOUS at 14:29

## 2022-06-16 NOTE — PROVIDER NOTIFICATION
0803: Lab called to report delta hgb change: yesterday hgb was 13.6 and today it is 8.8.     Dr. Nowak updated 0845

## 2022-06-16 NOTE — PLAN OF CARE
Goal Outcome Evaluation:                  A&Ox4, stable on feet. Ramsay draining dark red, clearing up later in the shift . Incision on left flank appears to have small amount of dried drainage, otherwise sealed. Reported pain was mild and tolerable.

## 2022-06-16 NOTE — PROGRESS NOTES
POD #1    Looks good, comfortable with vague ipsilateral flank ache    Urine appropriate    CXR normal    am labs pending    Anticipate discharge home today with stent extraction in 2 weeks in clinic

## 2022-06-16 NOTE — PLAN OF CARE
Problem: Plan of Care - These are the overarching goals to be used throughout the patient stay.    Goal: Optimal Comfort and Wellbeing  Outcome: Ongoing, Progressing      Problem: Pain Acute  Goal: Acceptable Pain Control and Functional Ability  Outcome: Ongoing, Progressing  Intervention: Prevent or Manage Pain  Recent Flowsheet Documentation  Taken 6/16/2022 0000 by Heladio Gomez, RN  Medication Review/Management: medications reviewed     Pt reports pain 2-3/10 throughout night. Denies needing pain medication. Pt denies nausea. Pt ambulated to bathroom x1. Ramsay is patent and draining tea colored urine. Pt slept overnight. VS unchanged.    Heladio Gomez, RN

## 2022-06-16 NOTE — PLAN OF CARE
"Hgb 8.8 this am. Re-check at noon was 8.5. Dr. Nowak aware. A CT scan of her abdomen/pelvis was ordered this morning, however this has not been done yet. CT was called and they were unable to give a time of when this would be completed.  Pt reports francesco in her left side/abdomen that \"feels like gas pains.\" She did not want any pain medication other than her scheduled tylenol. Ice applied. Activity encouraged. Pt able to ambulate x1 and tolerated well.  Pt tolerating clear liquids. Her diet was upgraded to full liquids, although pt did not have much of an appetite this shift.                           "

## 2022-06-16 NOTE — PROGRESS NOTES
CT demonstrates blood in renal pelvis and perirenal space. Stones are completely clear.    Repeat hgb stable at 8.5.    Will recheck hgb tonight and in am

## 2022-06-17 ENCOUNTER — APPOINTMENT (OUTPATIENT)
Dept: INTERVENTIONAL RADIOLOGY/VASCULAR | Facility: HOSPITAL | Age: 67
DRG: 660 | End: 2022-06-17
Attending: NURSE PRACTITIONER
Payer: MEDICARE

## 2022-06-17 LAB
ANION GAP SERPL CALCULATED.3IONS-SCNC: 4 MMOL/L (ref 5–18)
APPEARANCE STONE: NORMAL
BLD PROD TYP BPU: NORMAL
BLOOD COMPONENT TYPE: NORMAL
BUN SERPL-MCNC: 16 MG/DL (ref 8–22)
CALCIUM SERPL-MCNC: 8.4 MG/DL (ref 8.5–10.5)
CHLORIDE BLD-SCNC: 111 MMOL/L (ref 98–107)
CO2 SERPL-SCNC: 24 MMOL/L (ref 22–31)
CODING SYSTEM: NORMAL
COMPN STONE: NORMAL
CREAT SERPL-MCNC: 1.21 MG/DL (ref 0.6–1.1)
CROSSMATCH: NORMAL
ERYTHROCYTE [DISTWIDTH] IN BLOOD BY AUTOMATED COUNT: 14 % (ref 10–15)
GFR SERPL CREATININE-BSD FRML MDRD: 49 ML/MIN/1.73M2
GLUCOSE BLD-MCNC: 115 MG/DL (ref 70–125)
HCT VFR BLD AUTO: 23.7 % (ref 35–47)
HGB BLD-MCNC: 7.6 G/DL (ref 11.7–15.7)
HGB BLD-MCNC: 8.5 G/DL (ref 11.7–15.7)
HGB BLD-MCNC: 8.6 G/DL (ref 11.7–15.7)
ISSUE DATE AND TIME: NORMAL
MCH RBC QN AUTO: 30.3 PG (ref 26.5–33)
MCHC RBC AUTO-ENTMCNC: 32.1 G/DL (ref 31.5–36.5)
MCV RBC AUTO: 94 FL (ref 78–100)
PLATELET # BLD AUTO: 139 10E3/UL (ref 150–450)
POTASSIUM BLD-SCNC: 3.9 MMOL/L (ref 3.5–5)
RBC # BLD AUTO: 2.51 10E6/UL (ref 3.8–5.2)
SODIUM SERPL-SCNC: 139 MMOL/L (ref 136–145)
SPECIMEN WT: 1279 MG
UNIT ABO/RH: NORMAL
UNIT NUMBER: NORMAL
UNIT STATUS: NORMAL
UNIT TYPE ISBT: 6200
WBC # BLD AUTO: 14 10E3/UL (ref 4–11)

## 2022-06-17 PROCEDURE — 85027 COMPLETE CBC AUTOMATED: CPT | Performed by: UROLOGY

## 2022-06-17 PROCEDURE — 272N000121 HC CATH CR6

## 2022-06-17 PROCEDURE — 36415 COLL VENOUS BLD VENIPUNCTURE: CPT | Performed by: UROLOGY

## 2022-06-17 PROCEDURE — 250N000011 HC RX IP 250 OP 636: Performed by: UROLOGY

## 2022-06-17 PROCEDURE — C1769 GUIDE WIRE: HCPCS

## 2022-06-17 PROCEDURE — 255N000002 HC RX 255 OP 636: Performed by: RADIOLOGY

## 2022-06-17 PROCEDURE — 250N000013 HC RX MED GY IP 250 OP 250 PS 637: Performed by: UROLOGY

## 2022-06-17 PROCEDURE — 36253 INS CATH REN ART 2ND+ UNILAT: CPT

## 2022-06-17 PROCEDURE — 272N000566 HC SHEATH CR3

## 2022-06-17 PROCEDURE — 80048 BASIC METABOLIC PNL TOTAL CA: CPT | Performed by: UROLOGY

## 2022-06-17 PROCEDURE — 99222 1ST HOSP IP/OBS MODERATE 55: CPT | Performed by: HOSPITALIST

## 2022-06-17 PROCEDURE — 258N000003 HC RX IP 258 OP 636: Performed by: UROLOGY

## 2022-06-17 PROCEDURE — 272N000117 HC CATH CR2

## 2022-06-17 PROCEDURE — 99152 MOD SED SAME PHYS/QHP 5/>YRS: CPT

## 2022-06-17 PROCEDURE — 272N000500 HC NEEDLE CR2

## 2022-06-17 PROCEDURE — P9016 RBC LEUKOCYTES REDUCED: HCPCS | Performed by: UROLOGY

## 2022-06-17 PROCEDURE — 250N000009 HC RX 250: Performed by: NURSE PRACTITIONER

## 2022-06-17 PROCEDURE — 120N000001 HC R&B MED SURG/OB

## 2022-06-17 PROCEDURE — C1887 CATHETER, GUIDING: HCPCS

## 2022-06-17 PROCEDURE — B417YZZ FLUOROSCOPY OF LEFT RENAL ARTERY USING OTHER CONTRAST: ICD-10-PCS | Performed by: RADIOLOGY

## 2022-06-17 PROCEDURE — 85018 HEMOGLOBIN: CPT | Performed by: UROLOGY

## 2022-06-17 PROCEDURE — 250N000011 HC RX IP 250 OP 636: Performed by: NURSE PRACTITIONER

## 2022-06-17 RX ORDER — ALBUTEROL SULFATE 90 UG/1
1-2 AEROSOL, METERED RESPIRATORY (INHALATION) 4 TIMES DAILY PRN
Status: DISCONTINUED | OUTPATIENT
Start: 2022-06-17 | End: 2022-06-17

## 2022-06-17 RX ORDER — NALOXONE HYDROCHLORIDE 0.4 MG/ML
0.2 INJECTION, SOLUTION INTRAMUSCULAR; INTRAVENOUS; SUBCUTANEOUS
Status: DISCONTINUED | OUTPATIENT
Start: 2022-06-17 | End: 2022-06-17

## 2022-06-17 RX ORDER — FLUMAZENIL 0.1 MG/ML
0.2 INJECTION, SOLUTION INTRAVENOUS
Status: DISCONTINUED | OUTPATIENT
Start: 2022-06-17 | End: 2022-06-17

## 2022-06-17 RX ORDER — NALOXONE HYDROCHLORIDE 0.4 MG/ML
0.4 INJECTION, SOLUTION INTRAMUSCULAR; INTRAVENOUS; SUBCUTANEOUS
Status: DISCONTINUED | OUTPATIENT
Start: 2022-06-17 | End: 2022-06-17

## 2022-06-17 RX ORDER — DIPHENHYDRAMINE HCL 25 MG
25 CAPSULE ORAL
Status: DISCONTINUED | OUTPATIENT
Start: 2022-06-17 | End: 2022-06-19 | Stop reason: HOSPADM

## 2022-06-17 RX ORDER — LORATADINE 10 MG/1
10 TABLET ORAL DAILY PRN
Status: DISCONTINUED | OUTPATIENT
Start: 2022-06-17 | End: 2022-06-19 | Stop reason: HOSPADM

## 2022-06-17 RX ORDER — IODIXANOL 320 MG/ML
150 INJECTION, SOLUTION INTRAVASCULAR ONCE
Status: COMPLETED | OUTPATIENT
Start: 2022-06-17 | End: 2022-06-17

## 2022-06-17 RX ORDER — FENTANYL CITRATE 50 UG/ML
25-50 INJECTION, SOLUTION INTRAMUSCULAR; INTRAVENOUS EVERY 5 MIN PRN
Status: DISCONTINUED | OUTPATIENT
Start: 2022-06-17 | End: 2022-06-17

## 2022-06-17 RX ORDER — SIMVASTATIN 10 MG
40 TABLET ORAL AT BEDTIME
Status: DISCONTINUED | OUTPATIENT
Start: 2022-06-17 | End: 2022-06-19 | Stop reason: HOSPADM

## 2022-06-17 RX ORDER — FLUOXETINE 10 MG/1
10 CAPSULE ORAL 2 TIMES DAILY
Status: DISCONTINUED | OUTPATIENT
Start: 2022-06-17 | End: 2022-06-19 | Stop reason: HOSPADM

## 2022-06-17 RX ORDER — FLUTICASONE PROPIONATE 50 MCG
2 SPRAY, SUSPENSION (ML) NASAL DAILY
Status: DISCONTINUED | OUTPATIENT
Start: 2022-06-18 | End: 2022-06-19 | Stop reason: HOSPADM

## 2022-06-17 RX ORDER — ALBUTEROL SULFATE 90 UG/1
2 AEROSOL, METERED RESPIRATORY (INHALATION) EVERY 4 HOURS PRN
Status: DISCONTINUED | OUTPATIENT
Start: 2022-06-17 | End: 2022-06-19 | Stop reason: HOSPADM

## 2022-06-17 RX ORDER — DEXTROSE MONOHYDRATE 25 G/50ML
25-50 INJECTION, SOLUTION INTRAVENOUS
Status: DISCONTINUED | OUTPATIENT
Start: 2022-06-17 | End: 2022-06-19 | Stop reason: HOSPADM

## 2022-06-17 RX ORDER — TRIAMTERENE/HYDROCHLOROTHIAZID 37.5-25 MG
1 TABLET ORAL DAILY
Status: DISCONTINUED | OUTPATIENT
Start: 2022-06-17 | End: 2022-06-19 | Stop reason: HOSPADM

## 2022-06-17 RX ORDER — NICOTINE POLACRILEX 4 MG
15-30 LOZENGE BUCCAL
Status: DISCONTINUED | OUTPATIENT
Start: 2022-06-17 | End: 2022-06-19 | Stop reason: HOSPADM

## 2022-06-17 RX ORDER — SODIUM CHLORIDE 9 MG/ML
INJECTION, SOLUTION INTRAVENOUS CONTINUOUS
Status: DISCONTINUED | OUTPATIENT
Start: 2022-06-17 | End: 2022-06-19

## 2022-06-17 RX ADMIN — MIDAZOLAM HYDROCHLORIDE 1 MG: 1 INJECTION, SOLUTION INTRAMUSCULAR; INTRAVENOUS at 11:26

## 2022-06-17 RX ADMIN — IODIXANOL 45 ML: 320 INJECTION, SOLUTION INTRAVASCULAR at 11:57

## 2022-06-17 RX ADMIN — FLUOXETINE 10 MG: 10 CAPSULE ORAL at 21:55

## 2022-06-17 RX ADMIN — POLYETHYLENE GLYCOL 3350 17 G: 17 POWDER, FOR SOLUTION ORAL at 19:47

## 2022-06-17 RX ADMIN — SODIUM CHLORIDE: 9 INJECTION, SOLUTION INTRAVENOUS at 06:35

## 2022-06-17 RX ADMIN — SENNOSIDES AND DOCUSATE SODIUM 1 TABLET: 8.6; 5 TABLET ORAL at 14:32

## 2022-06-17 RX ADMIN — TRIAMTERENE AND HYDROCHLOROTHIAZIDE 1 TABLET: 37.5; 25 TABLET ORAL at 19:47

## 2022-06-17 RX ADMIN — SENNOSIDES AND DOCUSATE SODIUM 1 TABLET: 8.6; 5 TABLET ORAL at 21:55

## 2022-06-17 RX ADMIN — ACETAMINOPHEN 975 MG: 325 TABLET, FILM COATED ORAL at 14:33

## 2022-06-17 RX ADMIN — FENTANYL CITRATE 50 MCG: 50 INJECTION, SOLUTION INTRAMUSCULAR; INTRAVENOUS at 11:30

## 2022-06-17 RX ADMIN — ACETAMINOPHEN 975 MG: 325 TABLET, FILM COATED ORAL at 06:30

## 2022-06-17 RX ADMIN — ONDANSETRON 4 MG: 2 INJECTION INTRAMUSCULAR; INTRAVENOUS at 21:16

## 2022-06-17 RX ADMIN — ACETAMINOPHEN 975 MG: 325 TABLET, FILM COATED ORAL at 21:55

## 2022-06-17 RX ADMIN — LIDOCAINE HYDROCHLORIDE 10 ML: 10 INJECTION, SOLUTION EPIDURAL; INFILTRATION; INTRACAUDAL; PERINEURAL at 11:28

## 2022-06-17 RX ADMIN — SODIUM CHLORIDE: 9 INJECTION, SOLUTION INTRAVENOUS at 14:46

## 2022-06-17 RX ADMIN — SIMVASTATIN 40 MG: 10 TABLET, FILM COATED ORAL at 21:55

## 2022-06-17 RX ADMIN — MIDAZOLAM HYDROCHLORIDE 0.5 MG: 1 INJECTION, SOLUTION INTRAMUSCULAR; INTRAVENOUS at 11:41

## 2022-06-17 ASSESSMENT — ACTIVITIES OF DAILY LIVING (ADL)
ADLS_ACUITY_SCORE: 22

## 2022-06-17 NOTE — H&P (VIEW-ONLY)
"  Interventional Radiology - Pre-Procedure Note:  6/17/2022    Procedure Requested: left renal angiogram  Requested by: Sergio Nowak MD    History and Physical Reviewed: H&P documented within 30 days (by Sergio Nowak MD on 6/15/22).  I have personally reviewed the patient's medical history and have updated the medical record as necessary.    Brief HPI: Pamela Shelton is a 66 year old female with PMHx HTN, COPD, hyperlipidemia who was admitted after left sided percutaneous nephrolithotomy 6/15. Urology planning for discharge 6/16 with stent extraction in two weeks however hemoglobin dropped from 13.6 to 8.8. CT 6/16 revealing \"postprocedure blood products scattered throughout the left perinephric fat\", hemoglobin drop to 7.5 6/16. Hgb today 7.6 and patient is to receive 1 unit of packed red blood cells.     Dr. Nowak (Urology) discussed with Dr. Salgado (IR) and plan to proceed with angiogram in IR today.     IMAGING:  EXAM: CT ABDOMEN PELVIS W/O CONTRAST  LOCATION: [facility]  DATE/TIME: 6/16/2022 4:07 PM     INDICATION: Left flank pain and hemoglobin drop from 13.6 to 8.5 status post 06/15/2022 percutaneous nephrolithotomy for symptomatic renal stones in a patient with known normal variant prominent left renal pelvis.  COMPARISON: 06/15/2022 left nephrolithotomy imaging, 05/19/2022 nuclear medicine renogram and 04/25/2022 contrast enhanced CT AP.  TECHNIQUE: CT scan of the abdomen and pelvis was performed without IV contrast. Multiplanar reformats were obtained. Dose reduction techniques were used.  CONTRAST: None.     FINDINGS:   LOWER CHEST: Near complete atelectasis basilar segments left lower lobe and trace left pleural effusion have developed. Slight increase in the mild platelike atelectasis basilar segments right lower lobe posteriorly. Heart size normal with no pericardial   effusion. Decreased density intracardiac blood pool consistent with known anemia. Small esophageal hiatal " hernia.     HEPATOBILIARY: Liver is normal. No bile duct dilatation. Cholecystectomy.     PANCREAS: Normal.     SPLEEN: Spleen size normal.     ADRENAL GLANDS: Normal.     RIGHT KIDNEY AND URETER: Normal.     LEFT KIDNEY AND URETER: Postop change from interval left percutaneous nephrolithotomy, stone clearance and placement of a well-positioned internal left ureteral stent with development of postprocedure blood products scattered throughout the left   perinephric fat and also within the chronically distended left intrarenal collecting system.     BLADDER: Small amount of clot material in the otherwise normal appearing bladder. Distal loop left ureteral stent and Ramsay catheter within the bladder.     BOWEL: Normal appendix. Colonic diverticulosis. Bowel is otherwise normal with no obstruction or inflammatory change.     LYMPH NODES: No lymphadenopathy.     VASCULATURE: Normal caliber abdominal aorta.       PELVIC ORGANS: No pelvic mass or fluid.     MUSCULOSKELETAL: Unremarkable.                                                                      IMPRESSION:   1.  Postop change from interval left percutaneous nephrolithotomy, stone clearance and placement of a well-positioned internal left ureteral stent with development of postprocedure blood products scattered throughout the left perinephric fat, the chronically distended left intrarenal collecting system and the bladder.  2.  Kidneys, ureters and bladder otherwise normal.  3.  Near complete atelectasis basilar segments left lower lobe and trace left pleural effusion have developed.   4.  Decreased density intracardiac blood pool consistent with known anemia.   5.  Small esophageal hiatal hernia.  6.  Cholecystectomy.       NPO: midnight  ANTICOAGULANTS: none  ANTIBIOTICS: none indicated for procedure    ALLERGIES  Allergies   Allergen Reactions     Erythromycin Nausea         LABS:  INR   Date Value Ref Range Status   06/15/2022 1.02 0.85 - 1.15 Final       Hemoglobin   Date Value Ref Range Status   06/17/2022 7.6 (L) 11.7 - 15.7 g/dL Final     Platelet Count   Date Value Ref Range Status   06/17/2022 139 (L) 150 - 450 10e3/uL Final     Creatinine   Date Value Ref Range Status   06/17/2022 1.21 (H) 0.60 - 1.10 mg/dL Final     Potassium   Date Value Ref Range Status   06/17/2022 3.9 3.5 - 5.0 mmol/L Final         EXAM:  BP 98/55 (BP Location: Left arm)   Pulse 80   Temp 98.6  F (37  C) (Oral)   Resp 16   Wt 77.3 kg (170 lb 6.4 oz)   SpO2 96%   BMI 30.19 kg/m    General:  Stable.  In no acute distress.    Neuro:  A&O x 3. Moves all extremities equally.  Resp:  Lungs clear to auscultation bilaterally. 2L O2 via NC  Cardio:  S1S2 and reg, without murmur, clicks or rubs  Abdomen:  Soft, non-distended  Vascular:  +2/4 bilateral femoral pulses    Pre-Sedation Assessment:  Mallampati Airway Classification:  I - Faucial pillars, soft palate, and uvula are visible  Previous reaction to anesthesia/sedation:  No  Sedation plan based on assessment: Moderate (conscious) sedation  ASA Classification: Class 4 - SEVERE SYSTEMIC DISEASE, ACUTE UNSTABLE PROBLEMS.   Code Status: Full Code intra procedure, per discussion with patient.       ASSESSMENT/PLAN:   Left renal angiogram with potential intervention and with sedation.    Procedure, risks/benefits, details, alternatives, and sedation reviewed with patient and patient verbalized understanding. All questions answered. OK to proceed with above radiology procedure.     GERA Lucero CNP  Interventional Radiology

## 2022-06-17 NOTE — PLAN OF CARE
Goal Outcome Evaluation:  Pt with hgb of 7.6.  Got one unit of PRBC's .  Hgb now 8.6.  Urine still tea colored.  Down in IR for embolization.  Pt back to floor around 1230 and had to lay flat until 1400.  Right groin site dry and intact and soft with no hematoma.  VSS.  Pt had clear liquids but not feeling like eating yet.  Will continue to offer food.  Can advance diet as pt tolerates.

## 2022-06-17 NOTE — PROCEDURES
[facility]    Procedure: Left renal angiogram    Date/Time: 6/17/2022 12:04 PM  Performed by: Jaron Salgado MD  Authorized by: Jaron Salgado MD       UNIVERSAL PROTOCOL   Site Marked: Yes  Prior Images Obtained and Reviewed:  Yes  Required items: Required blood products, implants, devices and special equipment available    Patient identity confirmed:  Verbally with patient  Patient was reevaluated immediately before administering moderate or deep sedation or anesthesia  Confirmation Checklist:  Patient's identity using two indicators, relevant allergies, procedure was appropriate and matched the consent or emergent situation and correct equipment/implants were available  Time out: Immediately prior to the procedure a time out was called    Universal Protocol: the Joint Commission Universal Protocol was followed    Preparation: Patient was prepped and draped in usual sterile fashion    ESBL (mL):  10     ANESTHESIA    Anesthesia: See MAR for details  Anesthetic Total (mL):  10      SEDATION  Patient Sedated: Yes    Sedation Type:  Moderate (conscious) sedation  Sedation:  Midazolam, see MAR for details and fentanyl  Vital signs: Vital signs monitored during sedation    See dictated procedure note for full details.    PROCEDURE  Describe Procedure:       IR PROCEDURE NOTE    Procedure:   Aortogram  Left renal angiogram    Complications: None      Findings:  No arterial extravasation or pseudoaneurysm in the left kidney.  Small inferior accessory renal branch is normal.  No intervention performed.    Plan:  2 hour bedrest  Repeat CTA or angio if significant bleed      For full details, please refer to the procedure report under the imaging tab in Epic.    Jaron Salgado MD  Pager [phone]            Patient Tolerance:  Patient tolerated the procedure well with no immediate complications  Length of time physician/provider present for 1:1 monitoring during sedation: 30

## 2022-06-17 NOTE — CONSULTS
"  Interventional Radiology - Pre-Procedure Note:  6/17/2022    Procedure Requested: left renal angiogram  Requested by: Sergio Nowak MD    History and Physical Reviewed: H&P documented within 30 days (by Sergio Nowak MD on 6/15/22).  I have personally reviewed the patient's medical history and have updated the medical record as necessary.    Brief HPI: Pamela Shelton is a 66 year old female with PMHx HTN, COPD, hyperlipidemia who was admitted after left sided percutaneous nephrolithotomy 6/15. Urology planning for discharge 6/16 with stent extraction in two weeks however hemoglobin dropped from 13.6 to 8.8. CT 6/16 revealing \"postprocedure blood products scattered throughout the left perinephric fat\", hemoglobin drop to 7.5 6/16. Hgb today 7.6 and patient is to receive 1 unit of packed red blood cells.     Dr. Nowak (Urology) discussed with Dr. Salgado (IR) and plan to proceed with angiogram in IR today.     IMAGING:  EXAM: CT ABDOMEN PELVIS W/O CONTRAST  LOCATION: [facility]  DATE/TIME: 6/16/2022 4:07 PM     INDICATION: Left flank pain and hemoglobin drop from 13.6 to 8.5 status post 06/15/2022 percutaneous nephrolithotomy for symptomatic renal stones in a patient with known normal variant prominent left renal pelvis.  COMPARISON: 06/15/2022 left nephrolithotomy imaging, 05/19/2022 nuclear medicine renogram and 04/25/2022 contrast enhanced CT AP.  TECHNIQUE: CT scan of the abdomen and pelvis was performed without IV contrast. Multiplanar reformats were obtained. Dose reduction techniques were used.  CONTRAST: None.     FINDINGS:   LOWER CHEST: Near complete atelectasis basilar segments left lower lobe and trace left pleural effusion have developed. Slight increase in the mild platelike atelectasis basilar segments right lower lobe posteriorly. Heart size normal with no pericardial   effusion. Decreased density intracardiac blood pool consistent with known anemia. Small esophageal hiatal " hernia.     HEPATOBILIARY: Liver is normal. No bile duct dilatation. Cholecystectomy.     PANCREAS: Normal.     SPLEEN: Spleen size normal.     ADRENAL GLANDS: Normal.     RIGHT KIDNEY AND URETER: Normal.     LEFT KIDNEY AND URETER: Postop change from interval left percutaneous nephrolithotomy, stone clearance and placement of a well-positioned internal left ureteral stent with development of postprocedure blood products scattered throughout the left   perinephric fat and also within the chronically distended left intrarenal collecting system.     BLADDER: Small amount of clot material in the otherwise normal appearing bladder. Distal loop left ureteral stent and Ramsay catheter within the bladder.     BOWEL: Normal appendix. Colonic diverticulosis. Bowel is otherwise normal with no obstruction or inflammatory change.     LYMPH NODES: No lymphadenopathy.     VASCULATURE: Normal caliber abdominal aorta.       PELVIC ORGANS: No pelvic mass or fluid.     MUSCULOSKELETAL: Unremarkable.                                                                      IMPRESSION:   1.  Postop change from interval left percutaneous nephrolithotomy, stone clearance and placement of a well-positioned internal left ureteral stent with development of postprocedure blood products scattered throughout the left perinephric fat, the chronically distended left intrarenal collecting system and the bladder.  2.  Kidneys, ureters and bladder otherwise normal.  3.  Near complete atelectasis basilar segments left lower lobe and trace left pleural effusion have developed.   4.  Decreased density intracardiac blood pool consistent with known anemia.   5.  Small esophageal hiatal hernia.  6.  Cholecystectomy.       NPO: midnight  ANTICOAGULANTS: none  ANTIBIOTICS: none indicated for procedure    ALLERGIES  Allergies   Allergen Reactions     Erythromycin Nausea         LABS:  INR   Date Value Ref Range Status   06/15/2022 1.02 0.85 - 1.15 Final       Hemoglobin   Date Value Ref Range Status   06/17/2022 7.6 (L) 11.7 - 15.7 g/dL Final     Platelet Count   Date Value Ref Range Status   06/17/2022 139 (L) 150 - 450 10e3/uL Final     Creatinine   Date Value Ref Range Status   06/17/2022 1.21 (H) 0.60 - 1.10 mg/dL Final     Potassium   Date Value Ref Range Status   06/17/2022 3.9 3.5 - 5.0 mmol/L Final         EXAM:  BP 98/55 (BP Location: Left arm)   Pulse 80   Temp 98.6  F (37  C) (Oral)   Resp 16   Wt 77.3 kg (170 lb 6.4 oz)   SpO2 96%   BMI 30.19 kg/m    General:  Stable.  In no acute distress.    Neuro:  A&O x 3. Moves all extremities equally.  Resp:  Lungs clear to auscultation bilaterally. 2L O2 via NC  Cardio:  S1S2 and reg, without murmur, clicks or rubs  Abdomen:  Soft, non-distended  Vascular:  +2/4 bilateral femoral pulses    Pre-Sedation Assessment:  Mallampati Airway Classification:  I - Faucial pillars, soft palate, and uvula are visible  Previous reaction to anesthesia/sedation:  No  Sedation plan based on assessment: Moderate (conscious) sedation  ASA Classification: Class 4 - SEVERE SYSTEMIC DISEASE, ACUTE UNSTABLE PROBLEMS.   Code Status: Full Code intra procedure, per discussion with patient.       ASSESSMENT/PLAN:   Left renal angiogram with potential intervention and with sedation.    Procedure, risks/benefits, details, alternatives, and sedation reviewed with patient and patient verbalized understanding. All questions answered. OK to proceed with above radiology procedure.     GERA Lucero CNP  Interventional Radiology

## 2022-06-17 NOTE — PLAN OF CARE
Problem: Plan of Care - These are the overarching goals to be used throughout the patient stay.    Goal: Optimal Comfort and Wellbeing  Outcome: Ongoing, Progressing     Problem: Pain Acute  Goal: Acceptable Pain Control and Functional Ability  Outcome: Ongoing, Progressing  Intervention: Prevent or Manage Pain  Recent Flowsheet Documentation  Taken 6/16/2022 9260 by Robby Jarquin RN  Medication Review/Management: medications reviewed   Goal Outcome Evaluation:        Pt A&Ox4. Denies of pain. Notes some pain with movement. Provided repositioning for comfort. Pt also C/O difficulty taking a deep breath. Repositioned, coached with deep breathing, and put pt on 2L nasal canula with relief. Ramsay catheter intact and patent. Output is light brown/daria.

## 2022-06-17 NOTE — PROGRESS NOTES
POD#2    Uncomfortable - left flank pain and MSK pain attributes to bed positioning    VSS, afebrile, on supplemental O2    Left sided abdomen and flank tender, slight bloating    Hgb dropped to 7.5 overnight    Will transfuse 1 unit PRBC    Will bring in Hospitalist consult    Communicating with IR regarding CT angiogram vs direct to angioembolization

## 2022-06-17 NOTE — PLAN OF CARE
"  Problem: Pain Acute  Goal: Acceptable Pain Control and Functional Ability  Intervention: Prevent or Manage Pain  Recent Flowsheet Documentation  Taken 6/16/2022 1540 by Adolfo Chamorro RN  Medication Review/Management: medications reviewed     Problem: Plan of Care - These are the overarching goals to be used throughout the patient stay.    Goal: Optimal Comfort and Wellbeing  Outcome: Ongoing, Progressing   Goal Outcome Evaluation:      Pt is A&O x4, VSS. Pt reported intermittent pain 2/10 saying it \"feels like gas\", but declined any intervention. Hgb at 8 pm was 7.5, Dr. Nowak said to reassure pt not to worry and to recheck hgb in the morning. Ramsay catheter patent with tea colored output. Pt ambulated in the hallway and tolerated it appropriately.                "

## 2022-06-17 NOTE — PRE-PROCEDURE
GENERAL PRE-PROCEDURE:   Procedure:  Left renal angio  Date/Time:  6/17/2022 10:23 AM    Written consent obtained?: Yes    Risks and benefits: Risks, benefits and alternatives were discussed    Consent given by:  Patient  Patient states understanding of procedure being performed: Yes    Patient's understanding of procedure matches consent: Yes    Procedure consent matches procedure scheduled: Yes    Expected level of sedation:  Moderate  Appropriately NPO:  Yes  ASA Class:  4  Mallampati  :  Grade 1- soft palate, uvula, tonsillar pillars, and posterior pharyngeal wall visible  Lungs:  Lungs clear with good breath sounds bilaterally  Heart:  Normal heart sounds and rate  History & Physical reviewed:  History and physical reviewed and no updates needed  Statement of review:  I have reviewed the lab findings, diagnostic data, medications, and the plan for sedation     Labile Labile Labile Labile Labile

## 2022-06-17 NOTE — PROGRESS NOTES
No active arterial bleeding demonstrated on renal angiogram. Suspect venous bleed which should settle conservatively.    Appropriate bump up in Hgb to 8.6 with single unit transfusion.    Will continue serial Hgb.    If further significant drop, may need to repeat renal angiogram.

## 2022-06-17 NOTE — UTILIZATION REVIEW
Admission Status; Secondary Review Determination       Under the authority of the Utilization Management Committee, the utilization review process indicated a secondary review on the above patient. The review outcome is based on review of the medical records, discussions with staff, and applying clinical experience noted on the date of the review.     (x) Inpatient Status Appropriate - This patient's medical care is consistent with medical management for inpatient care and reasonable inpatient medical practice.     RATIONALE FOR DETERMINATION   At the time of admission with the information available to the attending physician more than 2 nights Hospital complex care was anticipated, based on patient risk of adverse outcome if treated as outpatient and complex care required. Inpatient admission is appropriate based on the Medicare guidelines.     SUMMARY:    Pamela Shelton is a 66 year old female admitted on 6/15/2022. She has h/o of obesity, hypertension, COPD , calculus of left kidney s/p cystoscopy and percutaneous nephrolithotomy with stone extraction on 6/15. Noted sudden unexpected significant post-op anemia, work up with CT showed blood in renal pelvis.  She is receiving transfusions.  May need additional procedures.  Patient needs ongoing hospital care.    The information on this document is developed by the utilization review team in order for the business office to ensure compliance. This only denotes the appropriateness of proper admission status and does not reflect the quality of care rendered.   The definitions of Inpatient Status and Observation Status used in making the determination above are those provided in the CMS Coverage Manual, Chapter 1 and Chapter 6, section 70.4.     Sincerely,     Fredi Colon DO, [facility]  Utilization Review  Physician Advisor

## 2022-06-17 NOTE — PROGRESS NOTES
Pt returned to inpatient room.  Bedside handoff given to Regina BAUER.  Right groin and bilateral pulses checked.  Groin soft, no bleeding.

## 2022-06-18 LAB
ANION GAP SERPL CALCULATED.3IONS-SCNC: 5 MMOL/L (ref 5–18)
BUN SERPL-MCNC: 13 MG/DL (ref 8–22)
CALCIUM SERPL-MCNC: 8.4 MG/DL (ref 8.5–10.5)
CHLORIDE BLD-SCNC: 110 MMOL/L (ref 98–107)
CO2 SERPL-SCNC: 24 MMOL/L (ref 22–31)
CREAT SERPL-MCNC: 0.85 MG/DL (ref 0.6–1.1)
GFR SERPL CREATININE-BSD FRML MDRD: 74 ML/MIN/1.73M2
GLUCOSE BLD-MCNC: 89 MG/DL (ref 70–125)
HGB BLD-MCNC: 8.3 G/DL (ref 11.7–15.7)
HGB BLD-MCNC: 8.3 G/DL (ref 11.7–15.7)
POTASSIUM BLD-SCNC: 3.8 MMOL/L (ref 3.5–5)
SODIUM SERPL-SCNC: 139 MMOL/L (ref 136–145)

## 2022-06-18 PROCEDURE — 36415 COLL VENOUS BLD VENIPUNCTURE: CPT | Performed by: HOSPITALIST

## 2022-06-18 PROCEDURE — 99232 SBSQ HOSP IP/OBS MODERATE 35: CPT | Performed by: INTERNAL MEDICINE

## 2022-06-18 PROCEDURE — 120N000001 HC R&B MED SURG/OB

## 2022-06-18 PROCEDURE — 258N000003 HC RX IP 258 OP 636: Performed by: UROLOGY

## 2022-06-18 PROCEDURE — 85018 HEMOGLOBIN: CPT | Performed by: UROLOGY

## 2022-06-18 PROCEDURE — 82310 ASSAY OF CALCIUM: CPT | Performed by: UROLOGY

## 2022-06-18 PROCEDURE — 250N000013 HC RX MED GY IP 250 OP 250 PS 637: Performed by: UROLOGY

## 2022-06-18 PROCEDURE — 36415 COLL VENOUS BLD VENIPUNCTURE: CPT | Performed by: UROLOGY

## 2022-06-18 PROCEDURE — 85018 HEMOGLOBIN: CPT | Performed by: HOSPITALIST

## 2022-06-18 RX ADMIN — SODIUM CHLORIDE: 9 INJECTION, SOLUTION INTRAVENOUS at 02:28

## 2022-06-18 RX ADMIN — SODIUM CHLORIDE: 9 INJECTION, SOLUTION INTRAVENOUS at 10:38

## 2022-06-18 RX ADMIN — SENNOSIDES AND DOCUSATE SODIUM 1 TABLET: 8.6; 5 TABLET ORAL at 08:38

## 2022-06-18 RX ADMIN — POLYETHYLENE GLYCOL 3350 17 G: 17 POWDER, FOR SOLUTION ORAL at 08:37

## 2022-06-18 RX ADMIN — SIMVASTATIN 40 MG: 10 TABLET, FILM COATED ORAL at 21:12

## 2022-06-18 RX ADMIN — TRIAMTERENE AND HYDROCHLOROTHIAZIDE 1 TABLET: 37.5; 25 TABLET ORAL at 08:58

## 2022-06-18 RX ADMIN — SENNOSIDES AND DOCUSATE SODIUM 1 TABLET: 8.6; 5 TABLET ORAL at 21:12

## 2022-06-18 RX ADMIN — ACETAMINOPHEN 975 MG: 325 TABLET, FILM COATED ORAL at 08:38

## 2022-06-18 RX ADMIN — FLUTICASONE PROPIONATE 2 SPRAY: 50 SPRAY, METERED NASAL at 10:41

## 2022-06-18 RX ADMIN — FLUOXETINE 10 MG: 10 CAPSULE ORAL at 08:37

## 2022-06-18 RX ADMIN — FLUOXETINE 10 MG: 10 CAPSULE ORAL at 21:12

## 2022-06-18 ASSESSMENT — ACTIVITIES OF DAILY LIVING (ADL)
ADLS_ACUITY_SCORE: 22

## 2022-06-18 NOTE — PLAN OF CARE
Goal Outcome Evaluation:  Pain well controlled. Up with SBA. +BM/gas. Groin site soft/clean - was reinforced on PM's for some oozing. VSS on RA.      Problem: Pain Acute  Goal: Acceptable Pain Control and Functional Ability  Outcome: Ongoing, Progressing

## 2022-06-18 NOTE — PLAN OF CARE
Goal Outcome Evaluation:  Hgb 8.3.  No active bleeding noted.  Urine still tea colored.  No blood seen in stool.  Pt eating a regular diet and tolerating well.  Looking forward to ordering a hamburger for dinner.  Will continue to monitor.

## 2022-06-18 NOTE — PROGRESS NOTES
Urology progress note       A: Pamela Shelton is a 66 year old female with hx of nephrolithiasis status post left PCNL on 6/15/2022. Postop bleeding     She was transfused 1 unit pRBC yesterday   CT angio done yesterday did not show any arterial bleed       Doing better today. Had a bowel movement. Went to bathroom independently with no dizziness. Flank pain has improved. Tolerating FLD       /55 (BP Location: Left arm)   Pulse 74   Temp 98.5  F (36.9  C) (Oral)   Resp 18   Wt 77.3 kg (170 lb 6.4 oz)   SpO2 91%   BMI 30.19 kg/m     Gen NAD pale   Flank no ecchymosis, incision is intact no active bleeding   Ramsay in place with brown colored urine no bright red blood     Lab Results   Component Value Date    WBC 14.0 06/17/2022     Lab Results   Component Value Date    RBC 2.51 06/17/2022     Lab Results   Component Value Date    HGB 8.3 06/18/2022     Lab Results   Component Value Date    HCT 23.7 06/17/2022     No components found for: MCT  Lab Results   Component Value Date    MCV 94 06/17/2022     Lab Results   Component Value Date    MCH 30.3 06/17/2022     Lab Results   Component Value Date    MCHC 32.1 06/17/2022     Lab Results   Component Value Date    RDW 14.0 06/17/2022     Lab Results   Component Value Date     06/17/2022     Last Comprehensive Metabolic Panel:  Sodium   Date Value Ref Range Status   06/18/2022 139 136 - 145 mmol/L Final     Potassium   Date Value Ref Range Status   06/18/2022 3.8 3.5 - 5.0 mmol/L Final     Chloride   Date Value Ref Range Status   06/18/2022 110 (H) 98 - 107 mmol/L Final     Carbon Dioxide (CO2)   Date Value Ref Range Status   06/18/2022 24 22 - 31 mmol/L Final     Anion Gap   Date Value Ref Range Status   06/18/2022 5 5 - 18 mmol/L Final     Glucose   Date Value Ref Range Status   06/18/2022 89 70 - 125 mg/dL Final     Urea Nitrogen   Date Value Ref Range Status   06/18/2022 13 8 - 22 mg/dL Final     Creatinine   Date Value Ref Range Status   06/18/2022  0.85 0.60 - 1.10 mg/dL Final     GFR Estimate   Date Value Ref Range Status   06/18/2022 74 >60 mL/min/1.73m2 Final     Comment:     Effective December 21, 2021 eGFRcr in adults is calculated using the 2021 CKD-EPI creatinine equation which includes age and gender (Butch et al., NEJ, DOI: 10.1056/ETJRhk0755162)     Calcium   Date Value Ref Range Status   06/18/2022 8.4 (L) 8.5 - 10.5 mg/dL Final         A/P Pamela Shelton is pod # 3 status post left PCNL with postoperative bleeding doing well stable H&H no evidence of active bleeding     H&H check q 12 hrs   Drop MIVF to 75 ml/hr   Regular diet   Maintain Ramsay catheter  Possible discharge tomorrow or Monday pending the clinical course      Julius Garcia MD   Department of Urology   [facility]

## 2022-06-18 NOTE — PROGRESS NOTES
[facility]    Medicine Progress Note - Hospitalist Service    Date of Admission:  6/15/2022    Assessment & Plan          Pamela Shelton is a 66 year old female admitted on 6/15/2022. She has h/o of obesity, hypertension, COPD , calculus of left kidney s/p cystoscopy and percutaneous nephrolithotomy with stone extraction on 6/15. Noted post-op anemia, work up with CT showed blood in renal pelvis.     ABL anemia  -Hb on admission 13.6, noted significant drop to 8.8 s/p cystoscopy and percutaneous nephrolithotomy with stone extraction.  -CT abdomen showed postop changes from left percutaneous nephrolithotomy and blood products in renal pelvis  -Trend Hb Q8H, transfusing 1 U PRBC 6/17, hemoglobin in mid 8's  -S/p CT angio 6/17, no signs of arterial bleeding      Calculus left kidney  -S/p cystoscopy and percutaneous nephrolithotomy with stone extraction and stent placement on 6/15  -Management per primary team/urology     ANDRES- likely secondary to above, resolved now  -Minimize nephrotoxins, monitor creat     Hypertension-  borderline low BP on admission, normalized now with PTA antihypertensives (Mazide )were held.  Continue holding.    COPD- stable, not in exacerbation  -PTA inhalers       Diet: Regular Diet Adult    DVT Prophylaxis: Pneumatic Compression Devices  Ramsay Catheter: PRESENT, indication: /GI/GYN Pelvic Procedure  Central Lines: None  Cardiac Monitoring: None  Code Status: Full Code      Disposition Plan   Expected Discharge: 06/19/2022     Anticipated discharge location: home    Delays: dispo per urology, likely tomorrow Monday     The patient's care was discussed with the Bedside Nurse and Patient.    Anne Chacko MD  Hospitalist Service  [facility]  Securely message with the Vocera Web Console (learn more here)  Text page via [employer] Paging/Directory     Clinically Significant Risk Factors Present on Admission       ______________________________________________________________________    Interval History   Patient admitted by urology.  [facility] consulted for blood loss anemia.  Patient is new to me.  Chart reviewed.  Patient seen and examined.  Ramsay draining blood-tinged urine, no clots.  Flank pain improved.  No abdominal pain, nausea, chest pain, dyspnea, cough.    Data reviewed today: I reviewed all medications, new labs and imaging results over the last 24 hours. I personally reviewed    Physical Exam   Vital Signs: Temp: 98.6  F (37  C) Temp src: Oral BP: 123/59 Pulse: 90   Resp: 16 SpO2: 94 % O2 Device: None (Room air)    Weight: 170 lbs 6.4 oz  General: Alert and oriented x 3. Not in obvious distress.  HEENT: NC, AT. Neck- supple, No JVP elevation, lymphadenopathy or thyromegaly. Trachea-central.  Chest: Clear to auscultation bilaterally.  Heart: S1S2 regular. No M/R/G.  Abdomen: Soft. NT, ND. No organomegaly. Bowel sounds- active.  Back: No spine tenderness. No CVA tenderness.  Extremities: No leg swelling. Peripheral pulses 2+ bilaterally.  Neuro: Cranial nerves 1-12 grossly normal. No focal neurological deficit    Data   Recent Labs   Lab 06/18/22  0622 06/17/22  2106 06/17/22  1455 06/17/22  0615 06/16/22  1151 06/16/22  0716 06/16/22  0634 06/15/22  0530   WBC  --   --   --  14.0*  --  11.0  --  6.0   HGB 8.3* 8.5* 8.6* 7.6*   < > 8.8*  --  13.6   MCV  --   --   --  94  --  95  --  92   PLT  --   --   --  139*  --  146*  --  207   INR  --   --   --   --   --   --   --  1.02     --   --  139  --  140  --  139   POTASSIUM 3.8  --   --  3.9  --  4.1  --  3.3*   CHLORIDE 110*  --   --  111*  --  109*  --  103   CO2 24  --   --  24  --  28  --  28   BUN 13  --   --  16  --  19  --  21   CR 0.85  --   --  1.21*  --  1.23*  --  0.89   ANIONGAP 5  --   --  4*  --  3*  --  8   JAHAIRA 8.4*  --   --  8.4*  --  8.7  --  9.7   GLC 89  --   --  115  --  93   < > 120    < > = values in this interval not displayed.      Medications     Another Antibiotic has been ordered.       sodium chloride 75 mL/hr at 06/18/22 1328       acetaminophen  975 mg Oral Q8H     FLUoxetine  10 mg Oral BID     fluticasone  2 spray Both Nostrils Daily     ketorolac  15 mg Intravenous 60 Min Pre-Op     polyethylene glycol  17 g Oral Daily     senna-docusate  1 tablet Oral BID     simvastatin  40 mg Oral At Bedtime     sodium chloride (PF)  3 mL Intracatheter Q8H     sodium chloride (PF)  3 mL Intracatheter Q8H     triamterene-HCTZ  1 tablet Oral Daily

## 2022-06-19 VITALS
WEIGHT: 170.4 LBS | TEMPERATURE: 98.2 F | HEART RATE: 82 BPM | BODY MASS INDEX: 30.19 KG/M2 | SYSTOLIC BLOOD PRESSURE: 90 MMHG | OXYGEN SATURATION: 90 % | RESPIRATION RATE: 16 BRPM | DIASTOLIC BLOOD PRESSURE: 53 MMHG

## 2022-06-19 LAB — HGB BLD-MCNC: 8.8 G/DL (ref 11.7–15.7)

## 2022-06-19 PROCEDURE — 36415 COLL VENOUS BLD VENIPUNCTURE: CPT | Performed by: UROLOGY

## 2022-06-19 PROCEDURE — 99239 HOSP IP/OBS DSCHRG MGMT >30: CPT | Performed by: INTERNAL MEDICINE

## 2022-06-19 PROCEDURE — 85018 HEMOGLOBIN: CPT | Performed by: UROLOGY

## 2022-06-19 PROCEDURE — 250N000011 HC RX IP 250 OP 636: Performed by: UROLOGY

## 2022-06-19 PROCEDURE — 250N000013 HC RX MED GY IP 250 OP 250 PS 637: Performed by: UROLOGY

## 2022-06-19 RX ORDER — ONDANSETRON 4 MG/1
4 TABLET, ORALLY DISINTEGRATING ORAL EVERY 6 HOURS PRN
Qty: 8 TABLET | Refills: 0 | Status: SHIPPED | OUTPATIENT
Start: 2022-06-19 | End: 2022-09-15

## 2022-06-19 RX ORDER — TAMSULOSIN HYDROCHLORIDE 0.4 MG/1
0.4 CAPSULE ORAL DAILY
Qty: 20 CAPSULE | Refills: 0 | Status: SHIPPED | OUTPATIENT
Start: 2022-06-19 | End: 2022-07-25

## 2022-06-19 RX ORDER — AMOXICILLIN 250 MG
1 CAPSULE ORAL 2 TIMES DAILY PRN
Qty: 10 TABLET | Refills: 0 | Status: SHIPPED | OUTPATIENT
Start: 2022-06-19 | End: 2022-09-15

## 2022-06-19 RX ORDER — ACETAMINOPHEN 325 MG/1
650 TABLET ORAL EVERY 4 HOURS PRN
Qty: 30 TABLET | Refills: 11 | Status: SHIPPED | OUTPATIENT
Start: 2022-06-19

## 2022-06-19 RX ORDER — OXYBUTYNIN CHLORIDE 5 MG/1
5 TABLET ORAL 3 TIMES DAILY PRN
Qty: 20 TABLET | Refills: 0 | Status: SHIPPED | OUTPATIENT
Start: 2022-06-19 | End: 2022-09-15

## 2022-06-19 RX ORDER — OXYCODONE HYDROCHLORIDE 5 MG/1
5 TABLET ORAL EVERY 4 HOURS PRN
Qty: 15 TABLET | Refills: 0 | Status: SHIPPED | OUTPATIENT
Start: 2022-06-19 | End: 2022-09-15

## 2022-06-19 RX ADMIN — TRIAMTERENE AND HYDROCHLOROTHIAZIDE 1 TABLET: 37.5; 25 TABLET ORAL at 08:07

## 2022-06-19 RX ADMIN — OXYCODONE HYDROCHLORIDE 5 MG: 5 TABLET ORAL at 05:20

## 2022-06-19 RX ADMIN — FLUOXETINE 10 MG: 10 CAPSULE ORAL at 08:07

## 2022-06-19 RX ADMIN — ONDANSETRON 4 MG: 4 TABLET, ORALLY DISINTEGRATING ORAL at 05:17

## 2022-06-19 RX ADMIN — FLUTICASONE PROPIONATE 2 SPRAY: 50 SPRAY, METERED NASAL at 08:06

## 2022-06-19 ASSESSMENT — ACTIVITIES OF DAILY LIVING (ADL)
ADLS_ACUITY_SCORE: 22

## 2022-06-19 NOTE — DISCHARGE SUMMARY
[facility]  Hospitalist Transfer of Care Summary      Date of Admission:  6/15/2022  Date of Discharge:  6/19/2022  Discharging Provider: Edison Lopez MD  Discharge Service: Hospitalist Service    Summary: 66 year old female admitted on 6/15/2022. She has h/o of obesity, hypertension, COPD , calculus of left kidney s/p cystoscopy and percutaneous nephrolithotomy with stone extraction on 6/15. Noted post-op anemia, work up with CT showed blood in renal pelvis. Hgb monitored and remained stable for > 36 hours.     Discharge Diagnoses    ABL anemia  -Hb on admission 13.6, noted significant drop to 8.8 s/p cystoscopy and percutaneous nephrolithotomy with stone extraction.  -CT abdomen showed postop changes from left percutaneous nephrolithotomy and blood products in renal pelvis  -Trended Hb Q8H, transfused 1 U PRBC 6/17, hemoglobin in mid 8's  -S/p CT angio 6/17, no signs of arterial bleeding, hgb remained stable.     Calculus left kidney  -S/p cystoscopy and percutaneous nephrolithotomy with stone extraction and stent placement on 6/15  -Management per primary team/urology     ANDRES- likely secondary to above  -resolved now  -Minimize nephrotoxins, monitor creat     Hypertension-  borderline low BP on admission, normalized now with PTA antihypertensives (Mazide )were held.  Continue holding.     COPD- stable, not in exacerbation  -PTA inhalers    Follow-ups Needed After Discharge   Follow-up Appointments     Follow-up and recommended labs and tests       Follow up with primary care provider, Mark Barrera, within 7 days to   evaluate medication change and for hospital follow- up.  The following   labs/tests are recommended: CBC, BMP.  Dr. Nowak in 10 days           Discharge Disposition   Discharged to home  Condition at discharge: Stable    Consultations This Hospital Stay   HOSPITALIST IP CONSULT    Code Status   Full Code    Time Spent on this Encounter   I, Edison Lopez MD, personally  saw the patient today and spent greater than 30 minutes discharging this patient.       Edison Lopez MD  [address] [address]  Phone: [phone]  Fax: [phone]  ______________________________________________________________________    Physical Exam   Vital Signs: Temp: 98.2  F (36.8  C) Temp src: Oral BP: 119/66 Pulse: 82   Resp: 16 SpO2: 90 % O2 Device: None (Room air) Oxygen Delivery: 1 LPM  Weight: 170 lbs 6.4 oz  Constitutional: awake, alert, cooperative, no apparent distress, and appears stated age  ENT: normocepalic, without obvious abnormality, atramatic  Cardiovascular: Normal apical impulse, regular rate and rhythm, normal S1 and S2, no S3 or S4, and no murmur noted  GI: normal bowel sounds, soft and non-distended  Resp: lungs clear to auscultation bilaterally  Neurologic: Mental Status Exam:  Level of Alertness:   awake  Orientation:   person, place, time  Memory:   normal  Motor Exam:  moves all extremities well and symmetrically  Sensory:  Sensory intact       Primary Care Physician   Mark Barrera    Discharge Orders      Reason for your hospital stay    Kidney stone     Follow-up and recommended labs and tests     Follow up with primary care provider, Mark Barrera, within 7 days to evaluate medication change and for hospital follow- up.  The following labs/tests are recommended: CBC, BMP.  Dr. Nowak in 10 days     Activity    Your activity upon discharge: activity as tolerated     Diet    Follow this diet upon discharge: Orders Placed This Encounter      Regular Diet Adult       Significant Results and Procedures   Most Recent 3 CBC's:Recent Labs   Lab Test 06/19/22  0601 06/18/22  1821 06/18/22  0622 06/17/22  1455 06/17/22  0615 06/16/22  1151 06/16/22  0716 06/15/22  0530   WBC  --   --   --   --  14.0*  --  11.0 6.0   HGB 8.8* 8.3* 8.3*   < > 7.6*   < > 8.8* 13.6   MCV  --   --   --   --  94  --  95 92   PLT  --   --   --    --  139*  --  146* 207    < > = values in this interval not displayed.     Most Recent 3 BMP's:Recent Labs   Lab Test 06/18/22  0622 06/17/22  0615 06/16/22  0716    139 140   POTASSIUM 3.8 3.9 4.1   CHLORIDE 110* 111* 109*   CO2 24 24 28   BUN 13 16 19   CR 0.85 1.21* 1.23*   ANIONGAP 5 4* 3*   JAHAIRA 8.4* 8.4* 8.7   GLC 89 115 93     Most Recent 3 INR's:Recent Labs   Lab Test 06/15/22  0530   INR 1.02     Most Recent 3 Creatinines:Recent Labs   Lab Test 06/18/22  0622 06/17/22  0615 06/16/22  0716   CR 0.85 1.21* 1.23*     Most Recent 3 Hemoglobins:Recent Labs   Lab Test 06/19/22  0601 06/18/22  1821 06/18/22  0622   HGB 8.8* 8.3* 8.3*   ,   Results for orders placed or performed during the hospital encounter of 06/15/22   XR Surgery RAZ Fluoro L/T 5 Min    Narrative    This exam was marked as non-reportable because it will not be read by a   radiologist or a [place] non-radiologist provider.         XR Chest 2 Views    Narrative    EXAM: XR CHEST 2 VW  LOCATION: [facility]  DATE/TIME: 6/15/2022 5:22 PM    INDICATION: post op perc nephrolithotomy  COMPARISON: None.      Impression    IMPRESSION: Cardiomediastinal silhouette at the upper limits of normal in size. Left basilar atelectasis without focal airspace consolidation, pleural effusion or pneumothorax. No acute bony abnormality. Left ureteral stent partially visualized.   XR Chest 2 Views    Narrative    EXAM: XR CHEST 2 VW  LOCATION: [facility]  DATE/TIME: 6/16/2022 4:23 PM    INDICATION: post op percutaneous nephrolithotomy  COMPARISON: Chest radiograph 06/15/2022      Impression    IMPRESSION: Stable cardiomediastinal silhouette. Small left pleural effusion. No focal airspace disease or pneumothorax. No acute bony abnormality.   CT Abdomen Pelvis w/o Contrast    Narrative    EXAM: CT ABDOMEN PELVIS W/O CONTRAST  LOCATION: [facility]  DATE/TIME: 6/16/2022 4:07 PM    INDICATION:  Left flank pain and hemoglobin drop from 13.6 to 8.5 status post 06/15/2022 percutaneous nephrolithotomy for symptomatic renal stones in a patient with known normal variant prominent left renal pelvis.  COMPARISON: 06/15/2022 left nephrolithotomy imaging, 05/19/2022 nuclear medicine renogram and 04/25/2022 contrast enhanced CT AP.  TECHNIQUE: CT scan of the abdomen and pelvis was performed without IV contrast. Multiplanar reformats were obtained. Dose reduction techniques were used.  CONTRAST: None.    FINDINGS:   LOWER CHEST: Near complete atelectasis basilar segments left lower lobe and trace left pleural effusion have developed. Slight increase in the mild platelike atelectasis basilar segments right lower lobe posteriorly. Heart size normal with no pericardial   effusion. Decreased density intracardiac blood pool consistent with known anemia. Small esophageal hiatal hernia.    HEPATOBILIARY: Liver is normal. No bile duct dilatation. Cholecystectomy.    PANCREAS: Normal.    SPLEEN: Spleen size normal.    ADRENAL GLANDS: Normal.    RIGHT KIDNEY AND URETER: Normal.    LEFT KIDNEY AND URETER: Postop change from interval left percutaneous nephrolithotomy, stone clearance and placement of a well-positioned internal left ureteral stent with development of postprocedure blood products scattered throughout the left   perinephric fat and also within the chronically distended left intrarenal collecting system.    BLADDER: Small amount of clot material in the otherwise normal appearing bladder. Distal loop left ureteral stent and Ramsay catheter within the bladder.    BOWEL: Normal appendix. Colonic diverticulosis. Bowel is otherwise normal with no obstruction or inflammatory change.    LYMPH NODES: No lymphadenopathy.    VASCULATURE: Normal caliber abdominal aorta.      PELVIC ORGANS: No pelvic mass or fluid.    MUSCULOSKELETAL: Unremarkable.      Impression    IMPRESSION:   1.  Postop change from interval left percutaneous  nephrolithotomy, stone clearance and placement of a well-positioned internal left ureteral stent with development of postprocedure blood products scattered throughout the left perinephric fat, the   chronically distended left intrarenal collecting system and the bladder.  2.  Kidneys, ureters and bladder otherwise normal.  3.  Near complete atelectasis basilar segments left lower lobe and trace left pleural effusion have developed.   4.  Decreased density intracardiac blood pool consistent with known anemia.   5.  Small esophageal hiatal hernia.  6.  Cholecystectomy.     IR Renal Angiogram Left    [facility]  PROCEDURE: ABDOMINAL AORTOGRAM AND SELECTIVE LEFT RENAL ARTERIOGRAM  1 ABDOMINAL AORTOGRAM  2 SELECTIVE FIRST-ORDER LEFT RENAL ARTERIOGRAM  3 SUPERSELECTIVE LEFT RENAL ARTERY ANGIOGRAM  6/17/2022 11:10 AM    ATTENDING: Jaron Salgado MD.  CONSENT: The risks, benefits and alternatives of procedure were discussed with the patient  in detail. All questions were answered. Informed consent was given to proceed with the procedure.    MODERATE SEDATION: Versed 1.5 mg IV; Fentanyl 50 mcg IV. During the time out and immediately prior to the administration of medications, the patient was reassessed for adequacy to receive conscious sedation.  Under physician supervision, Versed and   fentanyl were administered for moderate sedation. Pulse oximetry, heart rate and blood pressure were continuously monitored by an independent trained observer. The physician spent 30 minutes of face-to-face sedation time with the patient.    CONTRAST: 45 mm Visipaque  ANTIBIOTICS: None.  ADDITIONAL MEDICATIONS: None.    FLUOROSCOPIC TIME: 10.1 minutes.  RADIATION DOSE: Air Kerma: Air and 38 mGy.    COMPLICATIONS: No immediate complications.    STERILE BARRIER TECHNIQUE: Maximum sterile barrier technique was used. Cutaneous antisepsis was performed at the operative site with application of 2% chlorhexidine  and large sterile drape. Prior to the procedure, the  and assistant performed   hand hygiene and wore hat, mask, sterile gown, and sterile gloves during the entire procedure.    PROCEDURE:    The patient was placed supine on the fluoroscopy table. Patient was sterilely prepped and draped in normal fashion. The right  common femoral artery was visualized using ultrasound guidance. Artery was patent withMinimal atherosclerotic plaque.   Ultrasound image was saved to the medical record Under ultrasound guidance, a 21-gauge micropuncture needle was advanced into the right  common femoral artery. A 0.018 wire was placed through the needle into the vessel. The needle was exchanged for   coaxial dilator. A 0.035 inch Bentson  wire was then placed through the 5 Syrian coaxial dilator into the abdominal aorta. The dilator was exchanged over the wire for a 5 Syrian vascular sheath.    Omni Flush catheter was advanced into the upper abdominal aorta. Abdominal aortogram was performed.    5 Syrian C2 catheter was used to select the left main renal artery. Left main renal artery angiograms performed.    4 Syrian C2 glide catheter was then used to select multiple upper and midpole segmental branches. Multiple segmental left renal artery angiograms were performed.    4 Syrian C2 glide catheter was used to select a left inferior accessory renal artery below. Left accessory renal artery angiograms performed.    A right external iliac artery angiogram was performed through the side arm of the vascular sheath. The arteriotomy was then closed utilizing a 6 Syrian Perclose closure device. Hemostasis was achieved. A clean and sterile dressing was applied. The   patient tolerated the procedure well.    FINDINGS:   1. ABDOMINAL AORTOGRAM: Patent left main and inferior accessory renal arteries. Normal caliber aorta. Patent right main renal artery.    2. RENAL ARTERIOGRAM: Selective left renal arteriogram demonstrates widely patent  left main renal artery. Patent segmental arteries. No active extravasation. No pseudoaneurysm. Contrast continues to collect within the renal collecting system throughout   the procedure without direct arterial urinary fistula visualized.      Impression    IMPRESSION:  No source of bleeding on left renal artery angiogram. No evidence nonbleeding pseudoaneurysm or arterial venous fistula. No embolization performed.       Discharge Medications   Current Discharge Medication List      START taking these medications    Details   acetaminophen (TYLENOL) 325 MG tablet Take 2 tablets (650 mg) by mouth every 4 hours as needed for other (For optimal non-opioid multimodal pain management to improve pain control.)  Qty: 30 tablet, Refills: 11    Associated Diagnoses: Kidney stone      ondansetron (ZOFRAN ODT) 4 MG ODT tab Take 1 tablet (4 mg) by mouth every 6 hours as needed for nausea or vomiting  Qty: 8 tablet, Refills: 0    Associated Diagnoses: Kidney stone      oxyCODONE (ROXICODONE) 5 MG tablet Take 1 tablet (5 mg) by mouth every 4 hours as needed for severe pain  Qty: 15 tablet, Refills: 0    Associated Diagnoses: Kidney stone      senna-docusate (SENOKOT-S/PERICOLACE) 8.6-50 MG tablet Take 1 tablet by mouth 2 times daily as needed for constipation  Qty: 10 tablet, Refills: 0    Associated Diagnoses: Kidney stone         CONTINUE these medications which have NOT CHANGED    Details   albuterol (PROAIR HFA/PROVENTIL HFA/VENTOLIN HFA) 108 (90 Base) MCG/ACT inhaler Inhale 1-2 puffs into the lungs 4 times daily as needed      Ascorbic Acid (VITAMIN C) 500 MG CAPS Take 500 mg by mouth daily      diphenhydrAMINE (BENADRYL) 25 MG tablet Take 25 mg by mouth nightly as needed for itching or allergies      fish oil-omega-3 fatty acids 1000 MG capsule Take 1 g by mouth daily      FLUoxetine (PROZAC) 10 MG capsule TAKE ONE CAPSULE BY MOUTH TWICE DAILY  Qty: 180 capsule, Refills: 0    Associated Diagnoses: Depression       fluticasone (FLONASE) 50 MCG/ACT nasal spray Spray 2 sprays into both nostrils daily      ibuprofen (ADVIL/MOTRIN) 200 MG tablet Take 600 mg by mouth every 6 hours as needed for mild pain      loratadine (CLARITIN) 10 MG tablet Take 10 mg by mouth daily as needed for allergies      simvastatin (ZOCOR) 40 MG tablet Take 40 mg by mouth At Bedtime      triamterene-HCTZ (MAXZIDE-25) 37.5-25 MG tablet Take 0.5 tablets by mouth daily      vitamin B6 (PYRIDOXINE) 100 MG tablet Take 100 mg by mouth daily      vitamin D3 (CHOLECALCIFEROL) 50 mcg (2000 units) tablet Take 3 tablets by mouth daily      vitamin E 400 units TABS Take 400 Units by mouth daily           Allergies   Allergies   Allergen Reactions     Erythromycin Nausea

## 2022-06-19 NOTE — DISCHARGE SUMMARY
Discharge Summary     Pamela Shelton MRN# [id number]   YOB: 1955 Age: 66 year old     Date of Admission:  6/15/2022  Date of Discharge::  6/19/2022  Admitting Physician:  Sergio Nowak MD  Discharge Physician:  Julius Garcia MD   Primary Care Physician:         Mark Barrera          Admission Diagnoses:   Calculus of kidney [N20.0]  Personal history of urinary tract infection [Z87.440]  Kidney stone [N20.0]            Discharge Diagnosis:   Same as above         Procedures:   : Procedure(s):  NEPHROLITHOTOMY, PERCUTANEOUS AND STENT INSERTION WITH HOLIUM LASER LITHOTRIPSY        Non-operative procedures:   None performed          Consultations:   HOSPITALIST IP CONSULT           Imaging Studies:     Results for orders placed or performed during the hospital encounter of 06/15/22   XR Surgery RAZ Fluoro L/T 5 Min    Narrative    This exam was marked as non-reportable because it will not be read by a   radiologist or a [place] non-radiologist provider.         XR Chest 2 Views    Narrative    EXAM: XR CHEST 2 VW  LOCATION: [facility]  DATE/TIME: 6/15/2022 5:22 PM    INDICATION: post op perc nephrolithotomy  COMPARISON: None.      Impression    IMPRESSION: Cardiomediastinal silhouette at the upper limits of normal in size. Left basilar atelectasis without focal airspace consolidation, pleural effusion or pneumothorax. No acute bony abnormality. Left ureteral stent partially visualized.   XR Chest 2 Views    Narrative    EXAM: XR CHEST 2 VW  LOCATION: [facility]  DATE/TIME: 6/16/2022 4:23 PM    INDICATION: post op percutaneous nephrolithotomy  COMPARISON: Chest radiograph 06/15/2022      Impression    IMPRESSION: Stable cardiomediastinal silhouette. Small left pleural effusion. No focal airspace disease or pneumothorax. No acute bony abnormality.   CT Abdomen Pelvis w/o Contrast    Narrative    EXAM: CT ABDOMEN PELVIS  W/O CONTRAST  LOCATION: [facility]  DATE/TIME: 6/16/2022 4:07 PM    INDICATION: Left flank pain and hemoglobin drop from 13.6 to 8.5 status post 06/15/2022 percutaneous nephrolithotomy for symptomatic renal stones in a patient with known normal variant prominent left renal pelvis.  COMPARISON: 06/15/2022 left nephrolithotomy imaging, 05/19/2022 nuclear medicine renogram and 04/25/2022 contrast enhanced CT AP.  TECHNIQUE: CT scan of the abdomen and pelvis was performed without IV contrast. Multiplanar reformats were obtained. Dose reduction techniques were used.  CONTRAST: None.    FINDINGS:   LOWER CHEST: Near complete atelectasis basilar segments left lower lobe and trace left pleural effusion have developed. Slight increase in the mild platelike atelectasis basilar segments right lower lobe posteriorly. Heart size normal with no pericardial   effusion. Decreased density intracardiac blood pool consistent with known anemia. Small esophageal hiatal hernia.    HEPATOBILIARY: Liver is normal. No bile duct dilatation. Cholecystectomy.    PANCREAS: Normal.    SPLEEN: Spleen size normal.    ADRENAL GLANDS: Normal.    RIGHT KIDNEY AND URETER: Normal.    LEFT KIDNEY AND URETER: Postop change from interval left percutaneous nephrolithotomy, stone clearance and placement of a well-positioned internal left ureteral stent with development of postprocedure blood products scattered throughout the left   perinephric fat and also within the chronically distended left intrarenal collecting system.    BLADDER: Small amount of clot material in the otherwise normal appearing bladder. Distal loop left ureteral stent and Ramsay catheter within the bladder.    BOWEL: Normal appendix. Colonic diverticulosis. Bowel is otherwise normal with no obstruction or inflammatory change.    LYMPH NODES: No lymphadenopathy.    VASCULATURE: Normal caliber abdominal aorta.      PELVIC ORGANS: No pelvic mass or  fluid.    MUSCULOSKELETAL: Unremarkable.      Impression    IMPRESSION:   1.  Postop change from interval left percutaneous nephrolithotomy, stone clearance and placement of a well-positioned internal left ureteral stent with development of postprocedure blood products scattered throughout the left perinephric fat, the   chronically distended left intrarenal collecting system and the bladder.  2.  Kidneys, ureters and bladder otherwise normal.  3.  Near complete atelectasis basilar segments left lower lobe and trace left pleural effusion have developed.   4.  Decreased density intracardiac blood pool consistent with known anemia.   5.  Small esophageal hiatal hernia.  6.  Cholecystectomy.     IR Renal Angiogram Left    [facility]  PROCEDURE: ABDOMINAL AORTOGRAM AND SELECTIVE LEFT RENAL ARTERIOGRAM  1 ABDOMINAL AORTOGRAM  2 SELECTIVE FIRST-ORDER LEFT RENAL ARTERIOGRAM  3 SUPERSELECTIVE LEFT RENAL ARTERY ANGIOGRAM  6/17/2022 11:10 AM    ATTENDING: Jaron Salgado MD.  CONSENT: The risks, benefits and alternatives of procedure were discussed with the patient  in detail. All questions were answered. Informed consent was given to proceed with the procedure.    MODERATE SEDATION: Versed 1.5 mg IV; Fentanyl 50 mcg IV. During the time out and immediately prior to the administration of medications, the patient was reassessed for adequacy to receive conscious sedation.  Under physician supervision, Versed and   fentanyl were administered for moderate sedation. Pulse oximetry, heart rate and blood pressure were continuously monitored by an independent trained observer. The physician spent 30 minutes of face-to-face sedation time with the patient.    CONTRAST: 45 mm Visipaque  ANTIBIOTICS: None.  ADDITIONAL MEDICATIONS: None.    FLUOROSCOPIC TIME: 10.1 minutes.  RADIATION DOSE: Air Kerma: Air and 38 mGy.    COMPLICATIONS: No immediate complications.    STERILE BARRIER TECHNIQUE: Maximum sterile  barrier technique was used. Cutaneous antisepsis was performed at the operative site with application of 2% chlorhexidine and large sterile drape. Prior to the procedure, the  and assistant performed   hand hygiene and wore hat, mask, sterile gown, and sterile gloves during the entire procedure.    PROCEDURE:    The patient was placed supine on the fluoroscopy table. Patient was sterilely prepped and draped in normal fashion. The right  common femoral artery was visualized using ultrasound guidance. Artery was patent withMinimal atherosclerotic plaque.   Ultrasound image was saved to the medical record Under ultrasound guidance, a 21-gauge micropuncture needle was advanced into the right  common femoral artery. A 0.018 wire was placed through the needle into the vessel. The needle was exchanged for   coaxial dilator. A 0.035 inch Bentson  wire was then placed through the 5 [place] coaxial dilator into the abdominal aorta. The dilator was exchanged over the wire for a 5 [place] vascular sheath.    Omni Flush catheter was advanced into the upper abdominal aorta. Abdominal aortogram was performed.    5 [place] C2 catheter was used to select the left main renal artery. Left main renal artery angiograms performed.    4 [place] C2 glide catheter was then used to select multiple upper and midpole segmental branches. Multiple segmental left renal artery angiograms were performed.    4 [place] C2 glide catheter was used to select a left inferior accessory renal artery below. Left accessory renal artery angiograms performed.    A right external iliac artery angiogram was performed through the side arm of the vascular sheath. The arteriotomy was then closed utilizing a 6 [place] Perclose closure device. Hemostasis was achieved. A clean and sterile dressing was applied. The   patient tolerated the procedure well.    FINDINGS:   1. ABDOMINAL AORTOGRAM: Patent left main and inferior accessory renal arteries. Normal caliber  aorta. Patent right main renal artery.    2. RENAL ARTERIOGRAM: Selective left renal arteriogram demonstrates widely patent left main renal artery. Patent segmental arteries. No active extravasation. No pseudoaneurysm. Contrast continues to collect within the renal collecting system throughout   the procedure without direct arterial urinary fistula visualized.      Impression    IMPRESSION:  No source of bleeding on left renal artery angiogram. No evidence nonbleeding pseudoaneurysm or arterial venous fistula. No embolization performed.            Medications Prior to Admission:     Medications Prior to Admission   Medication Sig Dispense Refill Last Dose     albuterol (PROAIR HFA/PROVENTIL HFA/VENTOLIN HFA) 108 (90 Base) MCG/ACT inhaler Inhale 1-2 puffs into the lungs 4 times daily as needed   6/14/2022     Ascorbic Acid (VITAMIN C) 500 MG CAPS Take 500 mg by mouth daily   Past Week     diphenhydrAMINE (BENADRYL) 25 MG tablet Take 25 mg by mouth nightly as needed for itching or allergies   Past Month     fish oil-omega-3 fatty acids 1000 MG capsule Take 1 g by mouth daily   Past Week     FLUoxetine (PROZAC) 10 MG capsule TAKE ONE CAPSULE BY MOUTH TWICE DAILY 180 capsule 0 6/15/2022 at am     fluticasone (FLONASE) 50 MCG/ACT nasal spray Spray 2 sprays into both nostrils daily   6/15/2022     ibuprofen (ADVIL/MOTRIN) 200 MG tablet Take 600 mg by mouth every 6 hours as needed for mild pain   6/13/2022     loratadine (CLARITIN) 10 MG tablet Take 10 mg by mouth daily as needed for allergies   Past Week     simvastatin (ZOCOR) 40 MG tablet Take 40 mg by mouth At Bedtime   6/14/2022     triamterene-HCTZ (MAXZIDE-25) 37.5-25 MG tablet Take 0.5 tablets by mouth daily   6/14/2022     vitamin B6 (PYRIDOXINE) 100 MG tablet Take 100 mg by mouth daily   Past Week     vitamin D3 (CHOLECALCIFEROL) 50 mcg (2000 units) tablet Take 3 tablets by mouth daily   Past Week     vitamin E 400 units TABS Take 400 Units by mouth daily   Past  Week            Discharge Medications:     Current Discharge Medication List      START taking these medications    Details   acetaminophen (TYLENOL) 325 MG tablet Take 2 tablets (650 mg) by mouth every 4 hours as needed for other (For optimal non-opioid multimodal pain management to improve pain control.)  Qty: 30 tablet, Refills: 11    Associated Diagnoses: Kidney stone      ondansetron (ZOFRAN ODT) 4 MG ODT tab Take 1 tablet (4 mg) by mouth every 6 hours as needed for nausea or vomiting  Qty: 8 tablet, Refills: 0    Associated Diagnoses: Kidney stone      oxybutynin (DITROPAN) 5 MG tablet Take 1 tablet (5 mg) by mouth 3 times daily as needed for bladder spasms  Qty: 20 tablet, Refills: 0    Associated Diagnoses: Kidney stone      oxyCODONE (ROXICODONE) 5 MG tablet Take 1 tablet (5 mg) by mouth every 4 hours as needed for severe pain  Qty: 15 tablet, Refills: 0    Associated Diagnoses: Kidney stone      senna-docusate (SENOKOT-S/PERICOLACE) 8.6-50 MG tablet Take 1 tablet by mouth 2 times daily as needed for constipation  Qty: 10 tablet, Refills: 0    Associated Diagnoses: Kidney stone      tamsulosin (FLOMAX) 0.4 MG capsule Take 1 capsule (0.4 mg) by mouth daily  Qty: 20 capsule, Refills: 0    Associated Diagnoses: Kidney stone         CONTINUE these medications which have NOT CHANGED    Details   albuterol (PROAIR HFA/PROVENTIL HFA/VENTOLIN HFA) 108 (90 Base) MCG/ACT inhaler Inhale 1-2 puffs into the lungs 4 times daily as needed      Ascorbic Acid (VITAMIN C) 500 MG CAPS Take 500 mg by mouth daily      diphenhydrAMINE (BENADRYL) 25 MG tablet Take 25 mg by mouth nightly as needed for itching or allergies      fish oil-omega-3 fatty acids 1000 MG capsule Take 1 g by mouth daily      FLUoxetine (PROZAC) 10 MG capsule TAKE ONE CAPSULE BY MOUTH TWICE DAILY  Qty: 180 capsule, Refills: 0    Associated Diagnoses: Depression      fluticasone (FLONASE) 50 MCG/ACT nasal spray Spray 2 sprays into both nostrils daily       ibuprofen (ADVIL/MOTRIN) 200 MG tablet Take 600 mg by mouth every 6 hours as needed for mild pain      loratadine (CLARITIN) 10 MG tablet Take 10 mg by mouth daily as needed for allergies      simvastatin (ZOCOR) 40 MG tablet Take 40 mg by mouth At Bedtime      triamterene-HCTZ (MAXZIDE-25) 37.5-25 MG tablet Take 0.5 tablets by mouth daily      vitamin B6 (PYRIDOXINE) 100 MG tablet Take 100 mg by mouth daily      vitamin D3 (CHOLECALCIFEROL) 50 mcg (2000 units) tablet Take 3 tablets by mouth daily      vitamin E 400 units TABS Take 400 Units by mouth daily                    Brief History of Illness:   Reason for admission requiring a surgical or invasive procedure:   Calculus of kidney [N20.0]  Personal history of urinary tract infection [Z87.440]   The patient underwent the following procedure(s):   See above   There were no immediate complications during this procedure.    Please refer to the full operative summary for details.           Hospital Course:   The patient's hospital course was complicated by postoperative bleeding with no evidence of arterial bleeding on CT angiogram. She received 1 unit of pRBC and her H&H remained stable for the 36 hrs.     On POD#4 patient was ambulating without assitance, tolerating the discharge diet, had pain controlled with PO medications to go home with, and requiring no IV medications or fluids. She successfully passed the voiding trial prior to discharge     Patient was discharged home with appropriate contact information, follow-up and instructions as seen below in the discharge paperwork.         Final Pathology Result:   Pending at time of discharge         Discharge Instructions and Follow-Up:     Discharge Procedure Orders   Reason for your hospital stay   Order Comments: Kidney stone     Follow-up and recommended labs and tests    Order Comments: Follow up with primary care provider, Mark Barrera, within 7 days to evaluate medication change and for hospital  follow- up.  The following labs/tests are recommended: CBC, BMP.  Dr. Nowak in 10 days     Activity   Order Comments: Your activity upon discharge: activity as tolerated     Order Specific Question Answer Comments   Is discharge order? Yes      Diet   Order Comments: Follow this diet upon discharge: Orders Placed This Encounter      Regular Diet Adult     Order Specific Question Answer Comments   Is discharge order? Yes             Discharge Disposition:     Discharged to Home      Condition at discharge: Alan Garcia MD   Department of Urology   [facility]

## 2022-06-19 NOTE — PLAN OF CARE
Hgb 8.8 this AM. Surgical pain well controlled - did get Oxy for headache. Urine continues to be dark tea color/old blood. No s/sx of active bleeding. ?Discharge today.

## 2022-06-19 NOTE — PROGRESS NOTES
Urology progress note       She is doing well. Tolerating regular diet. H&H remained stable for the past 36 hrs. She has regular bowel movements. Ambulating with no issues        /66 (BP Location: Left arm)   Pulse 82   Temp 98.2  F (36.8  C) (Oral)   Resp 16   Wt 77.3 kg (170 lb 6.4 oz)   SpO2 90%   BMI 30.19 kg/m       Gen NAD Alert   resp Unlabored on room air   Flank incision site is CDI   Ramsay in place still draining brown urine with no clots       Lab Results   Component Value Date    WBC 14.0 06/17/2022     Lab Results   Component Value Date    RBC 2.51 06/17/2022     Lab Results   Component Value Date    HGB 8.8 06/19/2022     Lab Results   Component Value Date    HCT 23.7 06/17/2022     No components found for: MCT  Lab Results   Component Value Date    MCV 94 06/17/2022     Lab Results   Component Value Date    MCH 30.3 06/17/2022     Lab Results   Component Value Date    MCHC 32.1 06/17/2022     Lab Results   Component Value Date    RDW 14.0 06/17/2022     Lab Results   Component Value Date     06/17/2022       Last Comprehensive Metabolic Panel:  Sodium   Date Value Ref Range Status   06/18/2022 139 136 - 145 mmol/L Final     Potassium   Date Value Ref Range Status   06/18/2022 3.8 3.5 - 5.0 mmol/L Final     Chloride   Date Value Ref Range Status   06/18/2022 110 (H) 98 - 107 mmol/L Final     Carbon Dioxide (CO2)   Date Value Ref Range Status   06/18/2022 24 22 - 31 mmol/L Final     Anion Gap   Date Value Ref Range Status   06/18/2022 5 5 - 18 mmol/L Final     Glucose   Date Value Ref Range Status   06/18/2022 89 70 - 125 mg/dL Final     Urea Nitrogen   Date Value Ref Range Status   06/18/2022 13 8 - 22 mg/dL Final     Creatinine   Date Value Ref Range Status   06/18/2022 0.85 0.60 - 1.10 mg/dL Final     GFR Estimate   Date Value Ref Range Status   06/18/2022 74 >60 mL/min/1.73m2 Final     Comment:     Effective December 21, 2021 eGFRcr in adults is calculated using the 2021 CKD-EPI  creatinine equation which includes age and gender (Butch franz al., NEJM, DOI: 10.1056/AGOOcd3697445)     Calcium   Date Value Ref Range Status   06/18/2022 8.4 (L) 8.5 - 10.5 mg/dL Final             Intake/Output Summary (Last 24 hours) at 6/19/2022 1053  Last data filed at 6/19/2022 1000  Gross per 24 hour   Intake 480 ml   Output 3900 ml   Net -3420 ml         AP Pamela Shelton is a 66 year old pod # 4 status post PCNL with postop bleeding resolved     Voiding trial   Discharge today  Will discuss her follow up plan with Dr. She Garcia MD   Department of Urology   [facility]

## 2022-06-19 NOTE — DISCHARGE INSTRUCTIONS
"Percutaneous nephrolithotomy     Activity  - No heavy lifting for 2-3 weeks   - No strenuous exercise for 2-3 weeks     Shower    You can take shower at home   No bathing for 2-3 weeks       Diet:  You may resume your regular diet.     Common symptoms related to the stent:  - You will likely notice some blood in the urine for as long as the stent is in place. You may also notice more blood in the urine after physical activity. Normal urine color can range from yellow to dark red. This is not problematic as long as you are able to empty your bladder. Although urine may seem quite bloody, a few drops of blood can color the entire toilet bowl red- much like food coloring. Increase your fluid intake to help flush the blood out of your bladder.   - You may also notice a twinge of pain in your kidney during urination. This can be severe, but should get better after the first few days with the stent. This is due to urine traveling backward (up the stent into your kidney) when the bladder squeezes. It is normal and not a cause for concern.  - You may feel like you need to urinate more frequently than usual. Some patients experience a lower abdominal cramping or \"spasm\". You may notice urine leakage from your urethra after this happens. This is due to the stent rubbing against your bladder wall and should get better over the next few days. Additionally, your doctor may prescribe a medication to help with this called oxybutynin.   - You may experience some burning with urination due to minor trauma from the scope used during your surgery. This should disappear over the next few days.     Medications:  Anti-inflammatories/NSAIDs (Ibuprofen, Advil, Aleve) are the most effective pain relievers for stent-related discomfort. You may safely use these in combination with Tylenol.   You may also take Tylenol for pain control- but not more than 3000 mg daily.  Depending on your procedure, you may be given a limited supply of narcotic " "pain medications that you should use sparingly. These are typically not as effective as NSAIDs. These medications causes constipation so make sure to take a stool softener along with it. Do not drive while under the influence of narcotic pain medications.  Flomax (Tamsulosin): This is a medication used to relax the ureter. It should help with flank pain associated with the stent. Take 0.4 mg (1 pill) every day.  Oxybutynin (Ditropan): This is a medication used to treat overactive bladder/bladder spasms related to the stent. You may take up to 5 mg three times a day. This medication causes constipation so make sure to take a stool softener along with it.   Pyridium: This is a bladder anesthetic that can help with urinary burning. It will turn your urine bright orange. You may take 100 mg up to three times daily, but limit using this to three days maximum if possible.   We recommend over the counter fiber (metamucil or benefiber) and stool softeners (miralax, docusate or senna) to prevent postoperative constipation, but stop if you develop diarrhea.      Follow-Up:  - Follow up with Dr. Nowak for stent removal. (We will schedule it for you if it has not been scheduled already)    - Call or return sooner than your regularly scheduled visit if you develop any of the following: fever (greater than 101.5), uncontrolled pain, uncontrolled nausea or vomiting, or inability to urinate.    Phone numbers:   - Monday through Friday 8am to 4:30pm: Call [phone] with questions, requests for medication refills, or to schedule or confirm an appointment.  - Nights or weekends: call the after hours emergency pager - [phone] and tell the  \"I would like to page the Urologist on call.\" Please note, due to prescribing laws, resident physicians are unable to prescribe narcotics after-hours. If you feel as though you will need a refill of a narcotic pain medication, you will need to call the clinic during business hours OR " seek emergency care.  - For emergencies, call [phone]

## 2022-06-19 NOTE — PLAN OF CARE
Goal Outcome Evaluation:      Pt. Pleasant and cooperative this shift, denies pain, voiding trial completed and able to void, pt. Discharge to home, belongings sent with pt. Discharge paperwork discussed and questions answered. Pt. Left floor at 1500.

## 2022-06-21 DIAGNOSIS — N20.0 CALCULUS OF KIDNEY: Primary | ICD-10-CM

## 2022-06-28 ENCOUNTER — LAB (OUTPATIENT)
Dept: LAB | Facility: CLINIC | Age: 67
End: 2022-06-28
Payer: MEDICARE

## 2022-06-28 ENCOUNTER — OFFICE VISIT (OUTPATIENT)
Dept: UROLOGY | Facility: CLINIC | Age: 67
End: 2022-06-28
Payer: MEDICARE

## 2022-06-28 VITALS — TEMPERATURE: 98.1 F | HEART RATE: 85 BPM | DIASTOLIC BLOOD PRESSURE: 71 MMHG | SYSTOLIC BLOOD PRESSURE: 129 MMHG

## 2022-06-28 DIAGNOSIS — N20.0 CALCULUS OF KIDNEY: Primary | ICD-10-CM

## 2022-06-28 DIAGNOSIS — N20.0 CALCULUS OF KIDNEY: ICD-10-CM

## 2022-06-28 PROCEDURE — 87086 URINE CULTURE/COLONY COUNT: CPT

## 2022-06-28 PROCEDURE — 52310 CYSTOSCOPY AND TREATMENT: CPT | Mod: 58 | Performed by: UROLOGY

## 2022-06-28 RX ORDER — CIPROFLOXACIN 500 MG/1
500 TABLET, FILM COATED ORAL ONCE
Qty: 1 TABLET | Refills: 0 | Status: SHIPPED | OUTPATIENT
Start: 2022-06-28 | End: 2022-06-28

## 2022-06-28 ASSESSMENT — PAIN SCALES - GENERAL: PAINLEVEL: NO PAIN (0)

## 2022-06-28 NOTE — PATIENT INSTRUCTIONS
Patient Stated Goal: Prevent further stones  Cystoscopy with Stent Removal    Cystoscopy is used to help diagnose urinary problems, or to remove a ureteral stent.    During a cystoscopy, your doctor examines the inside of your bladder with an instrument called a cystoscope. A cystoscope is a long, thin flexible tube with a camera at the end.    Your doctor will insert the scope into your urethra allowing him to visualize and evaluate the inside of the bladder for possible abnormalities. The urethra is the tube that carries urine to the outside of your body.    How is the stent removed?    Your stent will be removed in the [facility] with a small telescope and a grasping tool.  It usually takes less than 1 minute to remove the stent.    What should I expect after the stent is removed?     You should feel normal by the next day.    Some patients find:      An increase in back pain about an hour after the stent is removed as the kidney fills up with urine before it starts to empty.  It can be as uncomfortable as your initial stone episode.  Taking pain medications before stent removal may be helpful, but you would need someone else to drive you to and from your appointment.    Bladder symptoms usually disappear by the next morning.    Small amounts of blood in the urine may be seen occasionally for up to a week.    At Home:      It is important to drink plenty of fluids after your procedure    You may continue to use your pain medications as prescribed    What symptoms should I watch for?    Fever     Chills    Increasing back pain that is not relieved with pain medications    Large amounts of blood in the urine or large clots    Leakage of urine (incontinence)     Are not able to urinate for 8 hours    These symptoms may mean you have a blockage or infection. Call the [facility] 24 hours a day at [phone] immediately.

## 2022-06-28 NOTE — PROGRESS NOTES
Patient educated regarding stent removal procedure and possible symptoms after removal.  Patient voiced understanding of information.  Handout given to patient.  Consent form signed.  Andie Castro RN    [facility] Timeout    Correct patient?: Yes  Correct site?:  Yes  Correct procedure?:  Yes  Correct laterality?:  Left  Consents verified?:  Yes  Relevant lab results available?:  Yes        Andie Castro RN

## 2022-06-28 NOTE — PROGRESS NOTES
Assessment/Plan:    Assessment & Plan   Pamela was seen today for follow up.    Diagnoses and all orders for this visit:    Calculus of kidney  -     ciprofloxacin (CIPRO) 500 MG tablet; Take 1 tablet (500 mg) by mouth once for 1 dose  -     Patient Stated Goal: Prevent further stones  -     CT Abdomen Pelvis w/o Contrast; Future        Stone Management Plan  Stone Management 4/28/2022 5/20/2022 6/28/2022   Urinary Tract Infection - No suspicion of infection No suspicion of infection   Renal Colic - Well controlled symptoms Well controlled symptoms   Renal Failure - No suspicion of renal failure No suspicion of renal failure   Current CT date - - -   Right sided stones? - - -   R Stone Event - No current event No current event   Left sided stones? - - -   L Number of ureteral stones - - -   L Number of kidney stones  - - -   L GSD of kidney stones - - -   L Hydronephrosis - None -   L Stone Event - Established event Established event   Diagnosis date - - -   Initial location of primary symptomatic stone - - -   Initial GSD of primary symptomatic stone 15 - -   Post-op status - - Stent Removal   L Current Plan - Clear -   Clear rationale - Associated treated infection -             BILL WILDE MD  [facility]    HPI  Ms. Pamela Shelton is a 67 year old  female who returns to [facility] for early postoperative follow up for anticipated stent removal.     She returns status post Left PCNL for renal stone. She has had a significant hgb drop post-operatively which stabilized and had a negative angiography..    She has had no symptoms suspicious for infection and stent was very well tolerated.     Flexible cystoscopy is performed and indwelling stent is removed without incident.    She will follow up in the office in one month with imaging.    ROS   Review of systems is negative except for HPI.    Past Medical History:   Diagnosis Date      Acute biliary pancreatitis 2018     Chronic rhinitis      COPD (chronic obstructive pulmonary disease) (H)      Depression      Eczema      Hyperlipidemia LDL goal <100      Hypertension      Kidney stone 04/2022     Papanicolaou smear of cervix with atypical squamous cells of undetermined significance (ASC-US)        Past Surgical History:   Procedure Laterality Date     APPENDECTOMY  04/05/1984     CHOLECYSTECTOMY  04/15/2018     ENDOSCOPIC RETROGRADE CHOLANGIOPANCREATOGRAPHY  04/16/2018     IR NEPHROLITHOTOMY  6/15/2022     IR RENAL ANGIOGRAM LEFT  6/17/2022     KIDNEY SURGERY      1972     PERCUTANEOUS NEPHROLITHOTOMY Left 6/15/2022    Procedure: NEPHROLITHOTOMY, PERCUTANEOUS AND STENT INSERTION WITH HOLIUM LASER LITHOTRIPSY;  Surgeon: Sergio Nowak MD;  Location: [facility] OR       Current Outpatient Medications   Medication Sig Dispense Refill     acetaminophen (TYLENOL) 325 MG tablet Take 2 tablets (650 mg) by mouth every 4 hours as needed for other (For optimal non-opioid multimodal pain management to improve pain control.) 30 tablet 11     albuterol (PROAIR HFA/PROVENTIL HFA/VENTOLIN HFA) 108 (90 Base) MCG/ACT inhaler Inhale 1-2 puffs into the lungs 4 times daily as needed       Ascorbic Acid (VITAMIN C) 500 MG CAPS Take 500 mg by mouth daily       ciprofloxacin (CIPRO) 500 MG tablet Take 1 tablet (500 mg) by mouth once for 1 dose 1 tablet 0     diphenhydrAMINE (BENADRYL) 25 MG tablet Take 25 mg by mouth nightly as needed for itching or allergies       fish oil-omega-3 fatty acids 1000 MG capsule Take 1 g by mouth daily       FLUoxetine (PROZAC) 10 MG capsule TAKE ONE CAPSULE BY MOUTH TWICE DAILY 180 capsule 0     fluticasone (FLONASE) 50 MCG/ACT nasal spray Spray 2 sprays into both nostrils daily       ibuprofen (ADVIL/MOTRIN) 200 MG tablet Take 600 mg by mouth every 6 hours as needed for mild pain       loratadine (CLARITIN) 10 MG tablet Take 10 mg by mouth daily as needed for allergies        senna-docusate (SENOKOT-S/PERICOLACE) 8.6-50 MG tablet Take 1 tablet by mouth 2 times daily as needed for constipation 10 tablet 0     simvastatin (ZOCOR) 40 MG tablet Take 40 mg by mouth At Bedtime       tamsulosin (FLOMAX) 0.4 MG capsule Take 1 capsule (0.4 mg) by mouth daily 20 capsule 0     triamterene-HCTZ (MAXZIDE-25) 37.5-25 MG tablet Take 0.5 tablets by mouth daily       vitamin B6 (PYRIDOXINE) 100 MG tablet Take 100 mg by mouth daily       vitamin D3 (CHOLECALCIFEROL) 50 mcg (2000 units) tablet Take 3 tablets by mouth daily       vitamin E 400 units TABS Take 400 Units by mouth daily       ondansetron (ZOFRAN ODT) 4 MG ODT tab Take 1 tablet (4 mg) by mouth every 6 hours as needed for nausea or vomiting (Patient not taking: Reported on 2022) 8 tablet 0     oxybutynin (DITROPAN) 5 MG tablet Take 1 tablet (5 mg) by mouth 3 times daily as needed for bladder spasms (Patient not taking: Reported on 2022) 20 tablet 0     oxyCODONE (ROXICODONE) 5 MG tablet Take 1 tablet (5 mg) by mouth every 4 hours as needed for severe pain (Patient not taking: Reported on 2022) 15 tablet 0       Allergies   Allergen Reactions     Erythromycin Nausea       Social History     Socioeconomic History     Marital status:      Spouse name: Not on file     Number of children: Not on file     Years of education: Not on file     Highest education level: Not on file   Occupational History     Not on file   Tobacco Use     Smoking status: Former Smoker     Packs/day: 1.00     Years: 40.00     Pack years: 40.00     Quit date:      Years since quittin.4     Smokeless tobacco: Never Used   Substance and Sexual Activity     Alcohol use: Not Currently     Drug use: Never     Sexual activity: Not on file   Other Topics Concern     Not on file   Social History Narrative     Not on file     Social Determinants of Health     Financial Resource Strain: Not on file   Food Insecurity: Not on file   Transportation Needs: Not  on file   Physical Activity: Not on file   Stress: Not on file   Social Connections: Not on file   Intimate Partner Violence: Not on file   Housing Stability: Not on file       Family History   Problem Relation Age of Onset     Heart Failure Mother      Arthritis Mother      Bleeding Disorder Mother      Bronchitis Mother      Depression Mother      Hyperlipidemia Mother      Obesity Mother      Sleep Apnea Mother      Uterine Cancer Mother      Diabetes Father      Hypertension Father      Bleeding Disorder Father      Bronchitis Father      Diabetes Type 1 Father      Malignant Hypertension Father      Sleep Apnea Father      Cancer Sister         Lung mestatized     Lung Cancer Sister      Hypertension Sister      Gout Brother      Hypertension Brother        Objective:          Labs   Most Recent 3 CBC's:Recent Labs   Lab Test 06/19/22  0601 06/18/22  1821 06/18/22  0622 06/17/22  1455 06/17/22  0615 06/16/22  1151 06/16/22  0716 06/15/22  0530   WBC  --   --   --   --  14.0*  --  11.0 6.0   HGB 8.8* 8.3* 8.3*   < > 7.6*   < > 8.8* 13.6   MCV  --   --   --   --  94  --  95 92   PLT  --   --   --   --  139*  --  146* 207    < > = values in this interval not displayed.     Most Recent 3 BMP's:Recent Labs   Lab Test 06/18/22  0622 06/17/22  0615 06/16/22  0716    139 140   POTASSIUM 3.8 3.9 4.1   CHLORIDE 110* 111* 109*   CO2 24 24 28   BUN 13 16 19   CR 0.85 1.21* 1.23*   ANIONGAP 5 4* 3*   JAHAIRA 8.4* 8.4* 8.7   GLC 89 115 93     Most Recent Urinalysis:Recent Labs   Lab Test 06/03/22  0905 04/25/22  1138   COLOR Yellow Light Yellow   APPEARANCE Clear Slightly Cloudy*   URINEGLC Negative Negative   URINEBILI Negative Negative   URINEKETONE Negative Negative   SG 1.010 1.010   UBLD Negative Large*   URINEPH 6.5 6.0   PROTEIN Negative Trace*   UROBILINOGEN 0.2 0.2   NITRITE Negative Negative   LEUKEST Negative Large*   RBCU  --  10-25*   WBCU  --  *

## 2022-06-29 ENCOUNTER — TELEPHONE (OUTPATIENT)
Dept: UROLOGY | Facility: CLINIC | Age: 67
End: 2022-06-29

## 2022-06-29 LAB — BACTERIA UR CULT: NORMAL

## 2022-06-29 NOTE — TELEPHONE ENCOUNTER
SPOKE WITH PT TO LET HER KNOW I FAXED HER CT ORDER TO [facility]. PT TO CALL BACK WHEN SHE HAS IT SCHED AND I WILL SET UP HER ONE MONTH POST OP

## 2022-07-25 ENCOUNTER — VIRTUAL VISIT (OUTPATIENT)
Dept: UROLOGY | Facility: CLINIC | Age: 67
End: 2022-07-25
Payer: MEDICARE

## 2022-07-25 DIAGNOSIS — N20.0 CALCULUS OF KIDNEY: Primary | ICD-10-CM

## 2022-07-25 DIAGNOSIS — N13.30 HYDRONEPHROSIS, UNSPECIFIED HYDRONEPHROSIS TYPE: ICD-10-CM

## 2022-07-25 PROCEDURE — 99024 POSTOP FOLLOW-UP VISIT: CPT | Performed by: UROLOGY

## 2022-07-25 ASSESSMENT — PAIN SCALES - GENERAL: PAINLEVEL: NO PAIN (0)

## 2022-07-25 NOTE — PATIENT INSTRUCTIONS
Patient Stated Goal: Prevent further stones  Calcium Oxalate Stone Prevention Self Management    Drink more fluids:    Drinking more liquids is the best way you can help prevent future stones. Stones can form when substances in the urine are too concentrated. The more you drink, the more urine you will make. This means all substances in the urine will be less concentrated.    How much urine should I be producing?    The usual recommended daily urine production is about 2 to 3 quarts (9950-1848 ml). If you are producing more than 3 quarts of urine on a regular basis, it is possible to deplete important minerals stored in the body.    To measure the amount of urine you produce in a day, you can either:    Collect all urine in a container and measure at the end of the day     Use a measuring cup each time you urinate and add up the amounts at the end of the day     Observe    Color - Dark daria urine is concentrated. Light straw color or lighter is dilute and desirable     Odor - Concentrated urine tends to smell stronger. Dilute urine is nearly odorless    Ways to increase your fluid intake    Increasing the amount of fluid you drink is effective for all types of kidney stones. While water is commonly recommended, all fluids are effective for increasing the amount of urine your body produces.    Focus on starting a lifelong habit, rather than a short-term solution.     Keep liquids on hand that you like. Crystal Light is a low calorie appropriate choice.    Drink out of larger glasses. You'll tend to drink more with each serving.     Have an additional glass of fluid with each meal.     Keep a water or drink bottle at work and fill it regularly.     *If you are prone to fluid retention, consult your doctor before making changes to your fluid habits.    Low Oxalate Diet:    Avoid excess amounts or daily consumption of these foods:    All nuts and nut products including peanuts, almonds, pecans, peanut butter, almond  milk    Rhubarb    Chocolate    Soybeans and soy products     Spinach    Wheat Germ    Beets    Maintain a normal calcium diet:    Researches have found that people with low calcium intakes tend to have more stones. Foods with high calcium content are acceptable and include:    Dairy products (including milk, cheese and yogurt)    Meat and fish    Enriched cereals    Dark green vegetables    What about calcium supplements?     Many people take calcium supplements, either on their own or as prescribed by a doctor. Research has indicated that calcium supplements do not usually pose a risk for stone formation.  Calcium citrate is a better choice for a supplement.    Avoid excess salt:    Salt (sodium chloride) is found in abundance in many foods. High sodium levels in the urine can interfere with the kidney's handling of calcium.     Tips for reducing the salt in your diet:    Don't use salt at the table    Reduce the salt used in food preparation. Try 1/2 teaspoon when recipes call for 1 teaspoon.    Use herbs and spices for flavoring instead of salt.    Avoid salty foods.    Check the label before you buy or use a product. Note sodium and portion size information.    Try to consume less than 2,000 mg/day. (1 teaspoon = 2,000 mg)    Foods with high sodium content include:    Processed meat (including luncheon meats, sausage)     Crackers     Instant cereal     Processed cheese     Canned soups     Chips and snack foods     Soy sauce    [facility] can respond to your questions or concerns 24 hours a day at [phone].

## 2022-07-25 NOTE — PROGRESS NOTES
Assessment/Plan:    Assessment & Plan   Pamela was seen today for post op follow up.    Diagnoses and all orders for this visit:    Calculus of kidney  -     Patient Stated Goal: Prevent further stones    Hydronephrosis, unspecified hydronephrosis type        Stone Management Plan  Stone Management 5/20/2022 6/28/2022 7/25/2022   Urinary Tract Infection No suspicion of infection No suspicion of infection No suspicion of infection   Renal Colic Well controlled symptoms Well controlled symptoms Asymptomatic at this time   Renal Failure No suspicion of renal failure No suspicion of renal failure No suspicion of renal failure   Current CT date - - 7/20/2022   Right sided stones? - - No   R Stone Event No current event No current event No current event   Left sided stones? - - Yes   L Number of ureteral stones - - No ureteral stones   L Number of kidney stones  - - 1   L GSD of kidney stones - - < 2   L Hydronephrosis None - Moderate   L Stone Event Established event Established event Resolved event   Diagnosis date - - -   Initial location of primary symptomatic stone - - -   Initial GSD of primary symptomatic stone - - -   Resolved date - - 7/25/2022   Post-op status - Stent Removal -   L Current Plan Clear - -   Clear rationale Associated treated infection - -           PLAN      Phone call duration: 10 minutes  15 minutes spent on the date of the encounter doing chart review, history and exam, documentation and further activities per the note    BILL WILDE MD  [facility]    Subjective:     HPI  Ms. Pamela Shelton is a 67 year old  female who is being evaluated via a billable telephone visit by [facility] for late postoperative follow-up.     She returns status post Left PCNL for renal stone. She has had no unanticipated post-operative events.     She is asymptomatic at present. She denies symptoms of fever, chills, flank pain, nausea,  vomiting, urinary frequency and dysuria.    New CT scan was personally reviewed and demonstrates largest residual fragment is <2 mm in the left renal lower pole with stable Moderate hydronephrosis.     Stone composition was 100% calcium oxalate.     She is a low risk stone former and was educated on conservative strategies for risk reduction         ROS   Review of systems is negative except for HPI.    Past Medical History:   Diagnosis Date     Acute biliary pancreatitis 2018     Chronic rhinitis      COPD (chronic obstructive pulmonary disease) (H)      Depression      Eczema      Hyperlipidemia LDL goal <100      Hypertension      Kidney stone 04/2022     Papanicolaou smear of cervix with atypical squamous cells of undetermined significance (ASC-US)        Past Surgical History:   Procedure Laterality Date     APPENDECTOMY  04/05/1984     CHOLECYSTECTOMY  04/15/2018     ENDOSCOPIC RETROGRADE CHOLANGIOPANCREATOGRAPHY  04/16/2018     IR NEPHROLITHOTOMY  6/15/2022     IR RENAL ANGIOGRAM LEFT  6/17/2022     KIDNEY SURGERY      1972     PERCUTANEOUS NEPHROLITHOTOMY Left 6/15/2022    Procedure: NEPHROLITHOTOMY, PERCUTANEOUS AND STENT INSERTION WITH HOLIUM LASER LITHOTRIPSY;  Surgeon: Sergio Nowak MD;  Location: [facility] OR       Current Outpatient Medications   Medication Sig Dispense Refill     acetaminophen (TYLENOL) 325 MG tablet Take 2 tablets (650 mg) by mouth every 4 hours as needed for other (For optimal non-opioid multimodal pain management to improve pain control.) 30 tablet 11     albuterol (PROAIR HFA/PROVENTIL HFA/VENTOLIN HFA) 108 (90 Base) MCG/ACT inhaler Inhale 1-2 puffs into the lungs 4 times daily as needed       Ascorbic Acid (VITAMIN C) 500 MG CAPS Take 500 mg by mouth daily       diphenhydrAMINE (BENADRYL) 25 MG tablet Take 25 mg by mouth nightly as needed for itching or allergies       fish oil-omega-3 fatty acids 1000 MG capsule Take 1 g by mouth daily       FLUoxetine (PROZAC) 10 MG  capsule TAKE ONE CAPSULE BY MOUTH TWICE DAILY 180 capsule 0     fluticasone (FLONASE) 50 MCG/ACT nasal spray Spray 2 sprays into both nostrils daily       ibuprofen (ADVIL/MOTRIN) 200 MG tablet Take 600 mg by mouth every 6 hours as needed for mild pain       loratadine (CLARITIN) 10 MG tablet Take 10 mg by mouth daily as needed for allergies       ondansetron (ZOFRAN ODT) 4 MG ODT tab Take 1 tablet (4 mg) by mouth every 6 hours as needed for nausea or vomiting (Patient not taking: Reported on 2022) 8 tablet 0     oxybutynin (DITROPAN) 5 MG tablet Take 1 tablet (5 mg) by mouth 3 times daily as needed for bladder spasms (Patient not taking: Reported on 2022) 20 tablet 0     oxyCODONE (ROXICODONE) 5 MG tablet Take 1 tablet (5 mg) by mouth every 4 hours as needed for severe pain (Patient not taking: Reported on 2022) 15 tablet 0     senna-docusate (SENOKOT-S/PERICOLACE) 8.6-50 MG tablet Take 1 tablet by mouth 2 times daily as needed for constipation 10 tablet 0     simvastatin (ZOCOR) 40 MG tablet Take 40 mg by mouth At Bedtime       triamterene-HCTZ (MAXZIDE-25) 37.5-25 MG tablet Take 0.5 tablets by mouth daily       vitamin B6 (PYRIDOXINE) 100 MG tablet Take 100 mg by mouth daily       vitamin D3 (CHOLECALCIFEROL) 50 mcg (2000 units) tablet Take 3 tablets by mouth daily       vitamin E 400 units TABS Take 400 Units by mouth daily         Allergies   Allergen Reactions     Erythromycin Nausea       Social History     Socioeconomic History     Marital status:      Spouse name: Not on file     Number of children: Not on file     Years of education: Not on file     Highest education level: Not on file   Occupational History     Not on file   Tobacco Use     Smoking status: Former Smoker     Packs/day: 1.00     Years: 40.00     Pack years: 40.00     Quit date:      Years since quittin.5     Smokeless tobacco: Never Used   Substance and Sexual Activity     Alcohol use: Not Currently     Drug  use: Never     Sexual activity: Not on file   Other Topics Concern     Not on file   Social History Narrative     Not on file     Social Determinants of Health     Financial Resource Strain: Not on file   Food Insecurity: Not on file   Transportation Needs: Not on file   Physical Activity: Not on file   Stress: Not on file   Social Connections: Not on file   Intimate Partner Violence: Not on file   Housing Stability: Not on file       Family History   Problem Relation Age of Onset     Heart Failure Mother      Arthritis Mother      Bleeding Disorder Mother      Bronchitis Mother      Depression Mother      Hyperlipidemia Mother      Obesity Mother      Sleep Apnea Mother      Uterine Cancer Mother      Diabetes Father      Hypertension Father      Bleeding Disorder Father      Bronchitis Father      Diabetes Type 1 Father      Malignant Hypertension Father      Sleep Apnea Father      Cancer Sister         Lung mestatized     Lung Cancer Sister      Hypertension Sister      Gout Brother      Hypertension Brother        Objective:     No vitals or physical exam obtained due to virtual visit  Labs     Most Recent 3 CBC's:Recent Labs   Lab Test 06/19/22  0601 06/18/22  1821 06/18/22  0622 06/17/22  1455 06/17/22  0615 06/16/22  1151 06/16/22  0716 06/15/22  0530   WBC  --   --   --   --  14.0*  --  11.0 6.0   HGB 8.8* 8.3* 8.3*   < > 7.6*   < > 8.8* 13.6   MCV  --   --   --   --  94  --  95 92   PLT  --   --   --   --  139*  --  146* 207    < > = values in this interval not displayed.     Most Recent 3 BMP's:Recent Labs   Lab Test 06/18/22  0622 06/17/22  0615 06/16/22  0716    139 140   POTASSIUM 3.8 3.9 4.1   CHLORIDE 110* 111* 109*   CO2 24 24 28   BUN 13 16 19   CR 0.85 1.21* 1.23*   ANIONGAP 5 4* 3*   JAHAIRA 8.4* 8.4* 8.7   GLC 89 115 93     Most Recent Urinalysis:Recent Labs   Lab Test 06/03/22  0905 04/25/22  1138   COLOR Yellow Light Yellow   APPEARANCE Clear Slightly Cloudy*   URINEGLC Negative Negative    URINEBILI Negative Negative   URINEKETONE Negative Negative   SG 1.010 1.010   UBLD Negative Large*   URINEPH 6.5 6.0   PROTEIN Negative Trace*   UROBILINOGEN 0.2 0.2   NITRITE Negative Negative   LEUKEST Negative Large*   RBCU  --  10-25*   WBCU  --  *     Acute Labs   Urine Culture    Culture   Date Value Ref Range Status   06/28/2022 <10,000 CFU/mL Mixture of urogenital oliverio  Final   04/25/2022 50,000-100,000 CFU/mL Escherichia coli (A)  Final

## 2022-07-25 NOTE — PROGRESS NOTES
Patient is roomed via telephone for a virtual visit.  Patient confirmed she is in the [facility] at the time of this appointment.  Patient understands that this virtual visit is billable and agree to proceed with appointment.

## 2022-08-09 DIAGNOSIS — J31.0 CHRONIC RHINITIS: Primary | ICD-10-CM

## 2022-08-10 RX ORDER — FLUTICASONE PROPIONATE 50 MCG
SPRAY, SUSPENSION (ML) NASAL
Qty: 16 ML | Refills: 5 | Status: SHIPPED | OUTPATIENT
Start: 2022-08-10 | End: 2023-01-25

## 2022-08-10 NOTE — TELEPHONE ENCOUNTER
Last Written Prescription Date:  7/13/21  Last Fill Quantity: ?,  # refills: ?   Last office visit: 6/3/2022, calculus of kidney/pre op  Future Office Visit:            Prescription approved per [facility] Refill Protocol. 6 month supply    Abbie Smith RN

## 2022-09-15 ENCOUNTER — OFFICE VISIT (OUTPATIENT)
Dept: FAMILY MEDICINE | Facility: CLINIC | Age: 67
End: 2022-09-15
Payer: MEDICARE

## 2022-09-15 VITALS
HEART RATE: 79 BPM | DIASTOLIC BLOOD PRESSURE: 72 MMHG | TEMPERATURE: 97.2 F | BODY MASS INDEX: 28.93 KG/M2 | WEIGHT: 163.3 LBS | SYSTOLIC BLOOD PRESSURE: 124 MMHG

## 2022-09-15 DIAGNOSIS — E66.09 CLASS 1 OBESITY DUE TO EXCESS CALORIES WITH SERIOUS COMORBIDITY AND BODY MASS INDEX (BMI) OF 30.0 TO 30.9 IN ADULT: ICD-10-CM

## 2022-09-15 DIAGNOSIS — I10 PRIMARY HYPERTENSION: ICD-10-CM

## 2022-09-15 DIAGNOSIS — Z12.31 VISIT FOR SCREENING MAMMOGRAM: ICD-10-CM

## 2022-09-15 DIAGNOSIS — Z12.11 SCREEN FOR COLON CANCER: ICD-10-CM

## 2022-09-15 DIAGNOSIS — E66.811 CLASS 1 OBESITY DUE TO EXCESS CALORIES WITH SERIOUS COMORBIDITY AND BODY MASS INDEX (BMI) OF 30.0 TO 30.9 IN ADULT: ICD-10-CM

## 2022-09-15 DIAGNOSIS — E78.2 MIXED HYPERLIPIDEMIA: ICD-10-CM

## 2022-09-15 DIAGNOSIS — D64.9 POSTOPERATIVE ANEMIA: ICD-10-CM

## 2022-09-15 DIAGNOSIS — F32.0 MILD MAJOR DEPRESSION (H): ICD-10-CM

## 2022-09-15 DIAGNOSIS — R73.02 IMPAIRED GLUCOSE TOLERANCE: ICD-10-CM

## 2022-09-15 DIAGNOSIS — N20.0 NEPHROLITHIASIS: ICD-10-CM

## 2022-09-15 DIAGNOSIS — Z11.59 NEED FOR HEPATITIS C SCREENING TEST: ICD-10-CM

## 2022-09-15 DIAGNOSIS — L72.0 EIC (EPIDERMAL INCLUSION CYST): ICD-10-CM

## 2022-09-15 DIAGNOSIS — J44.9 MODERATE COPD (CHRONIC OBSTRUCTIVE PULMONARY DISEASE) (H): Primary | ICD-10-CM

## 2022-09-15 LAB
ERYTHROCYTE [DISTWIDTH] IN BLOOD BY AUTOMATED COUNT: 13.6 % (ref 10–15)
HCT VFR BLD AUTO: 40.6 % (ref 35–47)
HGB BLD-MCNC: 13 G/DL (ref 11.7–15.7)
MCH RBC QN AUTO: 29 PG (ref 26.5–33)
MCHC RBC AUTO-ENTMCNC: 32 G/DL (ref 31.5–36.5)
MCV RBC AUTO: 91 FL (ref 78–100)
PLATELET # BLD AUTO: 196 10E3/UL (ref 150–450)
RBC # BLD AUTO: 4.48 10E6/UL (ref 3.8–5.2)
WBC # BLD AUTO: 5.8 10E3/UL (ref 4–11)

## 2022-09-15 PROCEDURE — 36415 COLL VENOUS BLD VENIPUNCTURE: CPT | Performed by: INTERNAL MEDICINE

## 2022-09-15 PROCEDURE — 85027 COMPLETE CBC AUTOMATED: CPT | Performed by: INTERNAL MEDICINE

## 2022-09-15 PROCEDURE — 90662 IIV NO PRSV INCREASED AG IM: CPT | Performed by: INTERNAL MEDICINE

## 2022-09-15 PROCEDURE — 86803 HEPATITIS C AB TEST: CPT | Performed by: INTERNAL MEDICINE

## 2022-09-15 PROCEDURE — 99214 OFFICE O/P EST MOD 30 MIN: CPT | Mod: 25 | Performed by: INTERNAL MEDICINE

## 2022-09-15 PROCEDURE — G0008 ADMIN INFLUENZA VIRUS VAC: HCPCS | Performed by: INTERNAL MEDICINE

## 2022-09-15 ASSESSMENT — ENCOUNTER SYMPTOMS
FREQUENCY: 0
HEARTBURN: 0
SHORTNESS OF BREATH: 0
CHILLS: 0
ABDOMINAL PAIN: 0
COUGH: 0
FATIGUE: 0
SLEEP DISTURBANCE: 1
HEMATURIA: 0
PALPITATIONS: 0
BRUISES/BLEEDS EASILY: 0
NERVOUS/ANXIOUS: 0
UNEXPECTED WEIGHT CHANGE: 0
HEMATOCHEZIA: 0
FEVER: 0

## 2022-09-15 NOTE — LETTER
My COPD Action Plan     Name: Pamela Shelton    YOB: 1955   Date: 9/15/2022    My doctor: Mark Barrera MD   My clinic: [address] [address] [phone]  My Controller Medicine: none   Dose:      My Rescue Medicine: Albuterol (Proair/Ventolin/Proventil) inhaler   Dose: as needed     My Flare Up Medicine:    Dose:      My COPD Severity: Moderate = FeV1 < 79% -50%      Use of Oxygen: Oxygen Not Prescribed      Make sure you've had your pneumonia   vaccines.          GREEN ZONE       Doing well today      Usual level of activity and exercise    Usual amount of cough and mucus    No shortness of breath    Usual level of health (thinking clearly, sleeping well, feel like eating) Actions:      Take daily medicines    Use oxygen as prescribed    Follow regular exercise and diet plan    Avoid cigarette smoke and other irritants that harm the lungs           YELLOW ZONE          Having a bad day or flare up      Short of breath more than usual    A lot more sputum (mucus) than usual    Sputum looks yellow, green, tan, brown or bloody    More coughing or wheezing    Fever or chills    Less energy; trouble completing activities    Trouble thinking or focusing    Using quick relief inhaler or nebulizer more often    Poor sleep; symptoms wake me up    Do not feel like eating Actions:      Get plenty of rest    Take daily medicines    Use quick relief inhaler every 2 hours    If you use oxygen, call you doctor to see if you should adjust your oxygen    Do breathing exercises or other things to help you relax    Let a loved one, friend or neighbor know you are feeling worse    Call your care team if you have 2 or more symptoms.  Start taking steroids or antibiotics if directed by your care team           RED ZONE       Need medical care now      Severe shortness of breath (feel you can't breathe)    Fever, chills    Not enough breath to do  any activity    Trouble coughing up mucus, walking or talking    Blood in mucus    Frequent coughing   Rescue medicines are not working    Not able to sleep because of breathing    Feel confused or drowsy    Chest pain    Actions:      Call your health care team.  If you cannot reach your care team, call 911 or go to the emergency room.        Annual Reminders:  Meet with Care Team, Flu Shot every Fall  Pharmacy: [address]

## 2022-09-15 NOTE — LETTER
September 19, 2022      Pamela Shelton  [address] [place] [id number]        Dear ,    We are writing to inform you of your test results.    Results are acceptable.       Resulted Orders   Hepatitis C Screen Reflex to HCV RNA Quant and Genotype   Result Value Ref Range    Hepatitis C Antibody Nonreactive Nonreactive    Narrative    Assay performance characteristics have not been established for newborns, infants, and children.   CBC with platelets   Result Value Ref Range    WBC Count 5.8 4.0 - 11.0 10e3/uL    RBC Count 4.48 3.80 - 5.20 10e6/uL    Hemoglobin 13.0 11.7 - 15.7 g/dL    Hematocrit 40.6 35.0 - 47.0 %    MCV 91 78 - 100 fL    MCH 29.0 26.5 - 33.0 pg    MCHC 32.0 31.5 - 36.5 g/dL    RDW 13.6 10.0 - 15.0 %    Platelet Count 196 150 - 450 10e3/uL       If you have any questions or concerns, please call the clinic at the number listed above.       Sincerely,      Mark Barrera MD

## 2022-09-15 NOTE — PROGRESS NOTES
Assessment & Plan     Visit for screening mammogram  No increased risk  - MA SCREENING DIGITAL BILAT - Future  (s+30); Future    Screen for colon cancer  Patient declined colon cancer screening    Need for hepatitis C screening test  Low risk  - Hepatitis C Screen Reflex to HCV RNA Quant and Genotype; Future    Moderate COPD (chronic obstructive pulmonary disease) (H)  Patient uses only as needed albuterol and is happy with that declines other medications.    Primary hypertension  Controlled    Mild major depression (H)  Stable    Impaired glucose tolerance  Stable    Class 1 obesity due to excess calories with serious comorbidity and body mass index (BMI) of 30.0 to 30.9 in adult  Patient is congratulated on having lost a little weight.    Mixed hyperlipidemia  On statin    Nephrolithiasis  Status post lithotripsy and nephrolithotomy    Postoperative anemia  Patient's hemoglobin dropped about 7 g with her nephrolithotomy  - CBC with platelets; Future    EIC (epidermal inclusion cyst)  [place] sized.  Warm compresses return if not improving.  Patient declined surgery referral for definitive therapy.                   Return in about 6 months (around 3/15/2023) for Routine preventive.    Mark Barrera MD  [facility]    Rogelio Santiago is a 67 year old accompanied by her self, presenting for the following health issues:  Diabetes, Hypertension, and Derm Problem (Lump on L side )      History of Present Illness       Reason for visit:  Prescription renewal    She eats 2-3 servings of fruits and vegetables daily.She exercises with enough effort to increase her heart rate 20 to 29 minutes per day.  She exercises with enough effort to increase her heart rate 6 days per week.   She is taking medications regularly.           Review of Systems   Constitutional: Negative for chills, fatigue, fever and unexpected weight change.   HENT: Negative for congestion.    Eyes: Negative for visual  disturbance.   Respiratory: Negative for cough and shortness of breath.    Cardiovascular: Negative for chest pain, palpitations and peripheral edema.   Gastrointestinal: Negative for abdominal pain, heartburn and hematochezia.   Genitourinary: Negative for frequency, hematuria and urgency.   Hematological: Does not bruise/bleed easily.   Psychiatric/Behavioral: Positive for sleep disturbance. The patient is not nervous/anxious.       Patient underwent surgery for kidney stone 3 months ago.  Is associated with significant anemia.  She has been active outdoors in the garden and has lost some weight.  Denies breathing issues.  Complains of a lump which is not tender in the left lower back.      Objective    /72   Pulse 79   Temp 97.2  F (36.2  C)   Wt 74.1 kg (163 lb 4.8 oz)   BMI 28.93 kg/m    Body mass index is 28.93 kg/m .  Physical Exam   Patient is comfortable and in no distress.  Alert and oriented.  Quarter sized firm red lump left back which is mobile and nontender.

## 2022-09-16 LAB — HCV AB SERPL QL IA: NONREACTIVE

## 2022-09-19 DIAGNOSIS — E78.2 MIXED HYPERLIPIDEMIA: ICD-10-CM

## 2022-09-19 DIAGNOSIS — J44.9 MODERATE COPD (CHRONIC OBSTRUCTIVE PULMONARY DISEASE) (H): Primary | ICD-10-CM

## 2022-09-19 RX ORDER — SIMVASTATIN 40 MG
TABLET ORAL
Qty: 90 TABLET | Refills: 3 | Status: SHIPPED | OUTPATIENT
Start: 2022-09-19 | End: 2023-06-15

## 2022-09-19 RX ORDER — ALBUTEROL SULFATE 90 UG/1
AEROSOL, METERED RESPIRATORY (INHALATION)
Qty: 36 G | Refills: 11 | Status: SHIPPED | OUTPATIENT
Start: 2022-09-19 | End: 2023-10-12

## 2022-09-19 NOTE — TELEPHONE ENCOUNTER
Prescription approved per [facility] Refill Protocol.    Last Written Prescription Date: 7/13/21  Last office visit: 9/15/2022 with prescribing provider

## 2022-09-19 NOTE — TELEPHONE ENCOUNTER
Reason for call:  Medication     If this is a refill request, has the caller requested the refill from the pharmacy already? Yes     Will the patient be using a [facility]? No     Name of the pharmacy and phone number for the current request: [place], [place] - [phone]    Name of the medication requested:    Other request:    Phone number to reach patient:  Home number on file [phone] (home)    Best Time:  ANYTIME    Can we leave a detailed message on this number?  YES    Travel screening: Not Applicable

## 2022-10-10 DIAGNOSIS — F32.A DEPRESSION: ICD-10-CM

## 2022-10-11 RX ORDER — FLUOXETINE 10 MG/1
CAPSULE ORAL
Qty: 180 CAPSULE | Refills: 0 | Status: SHIPPED | OUTPATIENT
Start: 2022-10-11 | End: 2023-01-13

## 2022-10-11 NOTE — TELEPHONE ENCOUNTER
Routing refill request to provider for review/approval because:  LOV 9/15/2022    [facility] Protocol Failed     PHQ-9 score less than 5 in past 6 months      PHQ-9 score:    PHQ 6/3/2022   PHQ-9 Total Score 5   Q9: Thoughts of better off dead/self-harm past 2 weeks Not at all       JUANCARLOS De La Rosa  [employer]

## 2023-01-12 DIAGNOSIS — F32.A DEPRESSION: ICD-10-CM

## 2023-01-13 RX ORDER — FLUOXETINE 10 MG/1
CAPSULE ORAL
Qty: 180 CAPSULE | Refills: 0 | Status: SHIPPED | OUTPATIENT
Start: 2023-01-13 | End: 2023-03-14

## 2023-01-13 NOTE — TELEPHONE ENCOUNTER
Routing refill request to provider for review/approval because:  Drug fails the [facility] refill protocol       Last Written Prescription Date: 10/11/22  Last Fill Quantity: 180,  # refills: 0   Last office visit: 9/15/2022 with prescribing provider

## 2023-01-25 DIAGNOSIS — J31.0 CHRONIC RHINITIS: ICD-10-CM

## 2023-01-25 RX ORDER — FLUTICASONE PROPIONATE 50 MCG
SPRAY, SUSPENSION (ML) NASAL
Qty: 16 ML | Refills: 5 | Status: SHIPPED | OUTPATIENT
Start: 2023-01-25 | End: 2023-03-14

## 2023-01-25 NOTE — TELEPHONE ENCOUNTER
Prescription approved per [facility] Refill Protocol.    Last Written Prescription Date: 8/10/22  Last Fill Quantity: 16ml,  # refills: 6   Last office visit: 9/15/2022 with prescribing provider

## 2023-03-14 ENCOUNTER — OFFICE VISIT (OUTPATIENT)
Dept: FAMILY MEDICINE | Facility: CLINIC | Age: 68
End: 2023-03-14
Payer: MEDICARE

## 2023-03-14 VITALS
TEMPERATURE: 97.1 F | HEIGHT: 63 IN | RESPIRATION RATE: 16 BRPM | DIASTOLIC BLOOD PRESSURE: 78 MMHG | SYSTOLIC BLOOD PRESSURE: 128 MMHG | OXYGEN SATURATION: 94 % | WEIGHT: 179 LBS | HEART RATE: 76 BPM | BODY MASS INDEX: 31.71 KG/M2

## 2023-03-14 DIAGNOSIS — M25.511 ACUTE PAIN OF BOTH SHOULDERS: ICD-10-CM

## 2023-03-14 DIAGNOSIS — M62.81 GENERALIZED MUSCLE WEAKNESS: ICD-10-CM

## 2023-03-14 DIAGNOSIS — F32.0 MILD MAJOR DEPRESSION (H): Primary | ICD-10-CM

## 2023-03-14 DIAGNOSIS — J31.0 CHRONIC RHINITIS: ICD-10-CM

## 2023-03-14 DIAGNOSIS — M25.512 ACUTE PAIN OF BOTH SHOULDERS: ICD-10-CM

## 2023-03-14 LAB
ALBUMIN SERPL BCG-MCNC: 4.7 G/DL (ref 3.5–5.2)
ALBUMIN UR-MCNC: NEGATIVE MG/DL
ALP SERPL-CCNC: 67 U/L (ref 35–104)
ALT SERPL W P-5'-P-CCNC: 32 U/L (ref 10–35)
ANION GAP SERPL CALCULATED.3IONS-SCNC: 12 MMOL/L (ref 7–15)
APPEARANCE UR: CLEAR
AST SERPL W P-5'-P-CCNC: 36 U/L (ref 10–35)
BASOPHILS # BLD AUTO: 0 10E3/UL (ref 0–0.2)
BASOPHILS NFR BLD AUTO: 1 %
BILIRUB SERPL-MCNC: 0.2 MG/DL
BILIRUB UR QL STRIP: NEGATIVE
BUN SERPL-MCNC: 24.2 MG/DL (ref 8–23)
CALCIUM SERPL-MCNC: 9.9 MG/DL (ref 8.8–10.2)
CHLORIDE SERPL-SCNC: 100 MMOL/L (ref 98–107)
COLOR UR AUTO: YELLOW
CREAT SERPL-MCNC: 0.86 MG/DL (ref 0.51–0.95)
CRP SERPL-MCNC: <3 MG/L
DEPRECATED HCO3 PLAS-SCNC: 29 MMOL/L (ref 22–29)
EOSINOPHIL # BLD AUTO: 0.2 10E3/UL (ref 0–0.7)
EOSINOPHIL NFR BLD AUTO: 3 %
ERYTHROCYTE [DISTWIDTH] IN BLOOD BY AUTOMATED COUNT: 13.3 % (ref 10–15)
ERYTHROCYTE [SEDIMENTATION RATE] IN BLOOD BY WESTERGREN METHOD: 8 MM/HR (ref 0–30)
GFR SERPL CREATININE-BSD FRML MDRD: 73 ML/MIN/1.73M2
GLUCOSE SERPL-MCNC: 93 MG/DL (ref 70–99)
GLUCOSE UR STRIP-MCNC: NEGATIVE MG/DL
HCT VFR BLD AUTO: 44.3 % (ref 35–47)
HGB BLD-MCNC: 14.4 G/DL (ref 11.7–15.7)
HGB UR QL STRIP: NEGATIVE
IMM GRANULOCYTES # BLD: 0 10E3/UL
IMM GRANULOCYTES NFR BLD: 0 %
KETONES UR STRIP-MCNC: NEGATIVE MG/DL
LEUKOCYTE ESTERASE UR QL STRIP: NEGATIVE
LYMPHOCYTES # BLD AUTO: 1.7 10E3/UL (ref 0.8–5.3)
LYMPHOCYTES NFR BLD AUTO: 32 %
MCH RBC QN AUTO: 29.4 PG (ref 26.5–33)
MCHC RBC AUTO-ENTMCNC: 32.5 G/DL (ref 31.5–36.5)
MCV RBC AUTO: 91 FL (ref 78–100)
MONOCYTES # BLD AUTO: 0.4 10E3/UL (ref 0–1.3)
MONOCYTES NFR BLD AUTO: 8 %
NEUTROPHILS # BLD AUTO: 3 10E3/UL (ref 1.6–8.3)
NEUTROPHILS NFR BLD AUTO: 56 %
NITRATE UR QL: NEGATIVE
PH UR STRIP: 7 [PH] (ref 5–7)
PLATELET # BLD AUTO: 211 10E3/UL (ref 150–450)
POTASSIUM SERPL-SCNC: 3.8 MMOL/L (ref 3.4–5.3)
PROT SERPL-MCNC: 7.4 G/DL (ref 6.4–8.3)
RBC # BLD AUTO: 4.89 10E6/UL (ref 3.8–5.2)
SODIUM SERPL-SCNC: 141 MMOL/L (ref 136–145)
SP GR UR STRIP: 1.02 (ref 1–1.03)
UROBILINOGEN UR STRIP-ACNC: 0.2 E.U./DL
WBC # BLD AUTO: 5.3 10E3/UL (ref 4–11)

## 2023-03-14 PROCEDURE — 85025 COMPLETE CBC W/AUTO DIFF WBC: CPT | Mod: QW | Performed by: PHYSICIAN ASSISTANT

## 2023-03-14 PROCEDURE — 80053 COMPREHEN METABOLIC PANEL: CPT | Performed by: PHYSICIAN ASSISTANT

## 2023-03-14 PROCEDURE — 99214 OFFICE O/P EST MOD 30 MIN: CPT | Performed by: PHYSICIAN ASSISTANT

## 2023-03-14 PROCEDURE — 36415 COLL VENOUS BLD VENIPUNCTURE: CPT | Performed by: PHYSICIAN ASSISTANT

## 2023-03-14 PROCEDURE — 81003 URINALYSIS AUTO W/O SCOPE: CPT | Mod: QW | Performed by: PHYSICIAN ASSISTANT

## 2023-03-14 PROCEDURE — 96127 BRIEF EMOTIONAL/BEHAV ASSMT: CPT | Performed by: PHYSICIAN ASSISTANT

## 2023-03-14 PROCEDURE — 86140 C-REACTIVE PROTEIN: CPT | Performed by: PHYSICIAN ASSISTANT

## 2023-03-14 PROCEDURE — 85652 RBC SED RATE AUTOMATED: CPT | Performed by: PHYSICIAN ASSISTANT

## 2023-03-14 RX ORDER — FLUOXETINE 10 MG/1
10 CAPSULE ORAL 2 TIMES DAILY
Qty: 180 CAPSULE | Refills: 1 | Status: SHIPPED | OUTPATIENT
Start: 2023-03-14 | End: 2023-04-05

## 2023-03-14 RX ORDER — FLUTICASONE PROPIONATE 50 MCG
SPRAY, SUSPENSION (ML) NASAL
Qty: 16 ML | Refills: 5 | Status: SHIPPED | OUTPATIENT
Start: 2023-03-14 | End: 2023-09-25

## 2023-03-14 ASSESSMENT — ENCOUNTER SYMPTOMS
GASTROINTESTINAL NEGATIVE: 1
CHILLS: 0
BREAST MASS: 0
FATIGUE: 1
SLEEP DISTURBANCE: 0
PARESTHESIAS: 0
DIZZINESS: 0
HEADACHES: 0
CARDIOVASCULAR NEGATIVE: 1
UNEXPECTED WEIGHT CHANGE: 0
ACTIVITY CHANGE: 1
NERVOUS/ANXIOUS: 1
WEAKNESS: 0
FEVER: 0
ENDOCRINE NEGATIVE: 1
EYE PAIN: 0

## 2023-03-14 ASSESSMENT — PATIENT HEALTH QUESTIONNAIRE - PHQ9
10. IF YOU CHECKED OFF ANY PROBLEMS, HOW DIFFICULT HAVE THESE PROBLEMS MADE IT FOR YOU TO DO YOUR WORK, TAKE CARE OF THINGS AT HOME, OR GET ALONG WITH OTHER PEOPLE: NOT DIFFICULT AT ALL
SUM OF ALL RESPONSES TO PHQ QUESTIONS 1-9: 8
SUM OF ALL RESPONSES TO PHQ QUESTIONS 1-9: 8

## 2023-03-14 NOTE — PROGRESS NOTES
"  Assessment & Plan     Chronic rhinitis  Trolled on current symptoms medications refilled  - fluticasone (FLONASE) 50 MCG/ACT nasal spray  Dispense: 16 mL; Refill: 5  - Erythrocyte sedimentation rate auto  - CRP, inflammation  - Erythrocyte sedimentation rate auto  - CRP, inflammation    Mild major depression (H)  To be having some breakthrough symptoms but will wait for lab work  - FLUoxetine (PROZAC) 10 MG capsule  Dispense: 180 capsule; Refill: 1  - Comprehensive metabolic panel (BMP + Alb, Alk Phos, ALT, AST, Total. Bili, TP)  - CBC with Platelets & Differential  - Comprehensive metabolic panel (BMP + Alb, Alk Phos, ALT, AST, Total. Bili, TP)  - CBC with Platelets & Differential    Acute pain of both shoulders  Lab work pending  - Erythrocyte sedimentation rate auto  - CRP, inflammation  - Erythrocyte sedimentation rate auto  - CRP, inflammation    Generalized muscle weakness  Lab work pending  - Erythrocyte sedimentation rate auto  - CRP, inflammation  - Comprehensive metabolic panel (BMP + Alb, Alk Phos, ALT, AST, Total. Bili, TP)  - CBC with Platelets & Differential  - UA macro with reflex to Microscopic and Culture - Clinc Collect  - Erythrocyte sedimentation rate auto  - CRP, inflammation  - Comprehensive metabolic panel (BMP + Alb, Alk Phos, ALT, AST, Total. Bili, TP)  - CBC with Platelets & Differential    I will wait for results. Observe for now. Will consider other etiologies if labs are negative and symptoms persist.           BMI:   Estimated body mass index is 31.71 kg/m  as calculated from the following:    Height as of this encounter: 1.6 m (5' 3\").    Weight as of this encounter: 81.2 kg (179 lb).           No follow-ups on file.    EVENS Eastman  [facility]    Rogelio Santiago is a 67 year old, presenting for the following health issues:  Generalized Weakness      67 presents the clinic with complaint of some generalized weakness and a crawly sensation that " seems to be inside and external shoulder symptoms for about the past 2 weeks  Only physical ailment that she has right now is some right shoulder pain she thinks that is from working her snowblower mounted on the tractor and getting caught in some ruts  No chest pain or shortness of breath  No URI symptoms no cough  No GI or  symptoms  No rash  Winter has been long for her and she has not had contact with a male  since fall this has been difficult on her as they have been together since her 's death number of years ago denies any homicidal or suicidal ideation  She does have 1 brother that she is still has contact with that she has 1 sister that she is estranged since their parents death  She is on fluoxetine 10 mg twice daily    Generalized Weakness  Associated symptoms include fatigue. Pertinent negatives include no chills, fever, headaches, rash or weakness.   History of Present Illness       Reason for visit:  Weakness in legs, shakiness mostly on my insides. shakiness trying to 'break to outside`.    She eats 0-1 servings of fruits and vegetables daily.She consumes 1 sweetened beverage(s) daily.She exercises with enough effort to increase her heart rate 30 to 60 minutes per day.  She exercises with enough effort to increase her heart rate 6 days per week.   She is taking medications regularly.    Today's PHQ-9         PHQ-9 Total Score: 8    PHQ-9 Q9 Thoughts of better off dead/self-harm past 2 weeks :   Not at all    How difficult have these problems made it for you to do your work, take care of things at home, or get along with other people: Not difficult at all             Review of Systems   Constitutional: Positive for activity change and fatigue. Negative for chills, fever and unexpected weight change.   HENT: Negative.    Eyes: Negative for pain and visual disturbance.   Cardiovascular: Negative.  Negative for peripheral edema.   Gastrointestinal: Negative.    Endocrine: Negative.   "  Breasts:  Negative for tenderness, breast mass and discharge.   Genitourinary: Negative.    Skin: Negative for rash.   Neurological: Negative for dizziness, weakness, headaches and paresthesias.   Psychiatric/Behavioral: Negative for mood changes, self-injury and sleep disturbance. The patient is nervous/anxious.             Objective    /78   Pulse 76   Temp 97.1  F (36.2  C)   Resp 16   Ht 1.6 m (5' 3\")   Wt 81.2 kg (179 lb)   LMP  (LMP Unknown)   SpO2 94%   BMI 31.71 kg/m    Body mass index is 31.71 kg/m .  Physical Exam  Musculoskeletal:         General: No deformity. Normal range of motion.   Neurological:      General: No focal deficit present.   Psychiatric:         Mood and Affect: Mood normal.         Behavior: Behavior normal.         Thought Content: Thought content normal.         Judgment: Judgment normal.                            "

## 2023-03-27 NOTE — CONSULTS
3/27/2023      RE: Michelle Schmid  [address] [facility] [address]     Dear Colleague,    Thank you for the opportunity to participate in the care of your patient, Michelle Schmid, at the [facility] at [facility]. Please see a copy of my visit note below.    Pediatric Endocrinology Follow Up Consultation    Patient: Michelle Schmid MRN# [id number]   YOB: 2015 Age: 7 year old   Date of Visit: Mar 27, 2023    Dear Dr. Jennifer France:    I had the pleasure of seeing your patient, Michelle Schmid in the Pediatric Endocrinology Clinic, [facility], on Mar 27, 2023 for follow up consultation regarding Hashimoto's hypothyroidism.           Problem list:     Patient Active Problem List    Diagnosis Date Noted     Family history of thyroid disease 02/02/2022     Priority: Medium     Hypothyroidism, unspecified type 02/02/2022     Priority: Medium     Vitamin D insufficiency 02/02/2022     Priority: Medium     H/O cold sores 11/26/2019     Priority: Medium     Prematurity 02/27/2017     Priority: Medium     34+4, twin gestation. special care nursery x10 days              HPI:   Michelle is a 7 year old 4 month old  female who is accompanied to clinic today by her parents and sister in follow up regarding Hashimoto's hypothyroidism.    Previous history is reviewed:  There is a family history of hypothyroidism (mom, dad, older half sister).  Michelle has a twin sister (Laverne) who had thyroid labs screened which were normal.  Michelle herself had no noted clinical symptoms of hypothyroidism but as Michelle generally doesn't complain about any pain or symptoms, she was also screened for hypothyroidism with the following results:     TSH   Date Value Ref Range Status   02/02/2022 110.43 (H) 0.40 - 4.00 mU/L Final   02/01/2022 74.68 (H) 0.40 - 4.00 mU/L Final     Free T4   Date Value Ref Range Status  [facility]  Consult Note - Hospitalist Service  Date of Admission:  6/15/2022  Consult Requested by:   Reason for Consult: Post-op anemia    Assessment & Plan   Pamela Shelton is a 66 year old female admitted on 6/15/2022. She has h/o of obesity, hypertension, COPD , calculus of left kidney s/p cystoscopy and percutaneous nephrolithotomy with stone extraction on 6/15. Noted post-op anemia, work up with CT showed blood in renal pelvis.    ABL anemia  -Hb on admission 13.6, noted significant drop to 8.8 s/p cystoscopy and percutaneous nephrolithotomy with stone extraction.  -CT abdomen showed postop changes from left percutaneous nephrolithotomy and blood products in renal pelvis  -Trend Hb Q8H, transfusing 1 U PRBC today per primary  -IR consulted for CT left renal angiogram, planned for today  -Keep NPO, IVF    Calculus left kidney  -S/p cystoscopy and percutaneous nephrolithotomy with stone extraction and stent placement  -Management per primary team    ANDRES- likely secondary to above  -Minimize nephrotoxins, monitor creat    Hypertension- currently borderline low  -Hold all PTA antihypertensives    COPD- stable, not in exacerbation  -PTA inhalers          The patient's care was discussed with the Patient.    Kacey Yi MD  [facility]  Securely message with the Vocera Web Console (learn more here)  Text page via [facility] Paging/Directory       Hospitalist Service      ______________________________________________________________________    Chief Complaint   Post-op anemia    History is obtained from the patient    History of Present Illness   Pamela Shelton is a 66 year old female h/o of obesity, hypertension, COPD , calculus of left kidney s/p cystoscopy and percutaneous nephrolithotomy with stone extraction on 6/15. Noted post-op anemia, work up with CT showed blood in renal pelvis.HMS consulted for further management    States currently feels ok when    02/02/2022 0.68 (L) 0.76 - 1.46 ng/dL Final   02/01/2022 0.64 (L) 0.76 - 1.46 ng/dL Final      Her TSH was markedly elevated with a low Free T4.  Thyroid peroxidase antibody was highly positive as was anti-thyroglubin antibody.  Results are indicative of Hashimoto's hypothyroidism.  Michelle was started on levothyroxine at 50 mcg on 2/3/2022.    Current history:  Michelle has remained healthy since our last visit 8/4/2022.  She continues on levothyroxine at 68.5 mcg (1/2 of a 137 mcg tab).  She takes this daily in the mornings prior to breakfast.  Very rare missed dosing.  She is sleeping well with normal energy.  There have been no issues with abdominal pain, diarrhea, or constipation.  Of note, her twin sister recently had an elevated TSH value with normal Free T4 value.        I have reviewed the available past laboratory evaluations, imaging studies, and medical records available to me at this visit. I have reviewed the Michelle's growth chart.      History was obtained from patient, patient's parents and electronic health record.          Past Medical History:     Past Medical History:   Diagnosis Date     Hypothyroidism             Past Surgical History:   No past surgical history on file.            Social History:     Social History     Social History Narrative     Not on file      Michelle lives at home with her mother, father, twin sister (Laverne), older paternal 1/2 brother.  She has another older brother and paternal 1/2 sister that live outside the home.  Michelle is in 3rd grade fall 2022. Michelle does well in school.          Family History:   Midparental Height is 68 inches.      Family History   Problem Relation Age of Onset     Thyroid Disease Mother      Rheumatoid Arthritis Father      Thyroid Disease Father      Hyperlipidemia Father      Hypertension Father      Dermatomyositis Father      Thyroid Disease Sister      Glaucoma Brother      Heart Disease Paternal Grandmother      Rheumatoid Arthritis Paternal Grandfather   in bed, pain and uncomfortable when moves in left flank area. Denies any chest pain, shortness of breath, abdominal pain, nausea, vomiting, cough, diarrhea, constipation, dysuria or polyuria.  No dizziness, palpitations.  No tingling or numbness.  No recent travel.  No known exposures to Covid.  No sick contact.        Review of Systems   The 10 point Review of Systems is negative other than noted in the HPI or here.     Past Medical History    I have reviewed this patient's medical history and updated it with pertinent information if needed.   Past Medical History:   Diagnosis Date     Acute biliary pancreatitis      Chronic rhinitis      COPD (chronic obstructive pulmonary disease) (H)      Depression      Eczema      Hyperlipidemia LDL goal <100      Hypertension      Kidney stone 2022     Papanicolaou smear of cervix with atypical squamous cells of undetermined significance (ASC-US)        Past Surgical History   I have reviewed this patient's surgical history and updated it with pertinent information if needed.  Past Surgical History:   Procedure Laterality Date     APPENDECTOMY  1984     CHOLECYSTECTOMY  04/15/2018     ENDOSCOPIC RETROGRADE CHOLANGIOPANCREATOGRAPHY  2018     IR NEPHROLITHOTOMY  6/15/2022     KIDNEY SURGERY      1972     PERCUTANEOUS NEPHROLITHOTOMY Left 6/15/2022    Procedure: NEPHROLITHOTOMY, PERCUTANEOUS AND STENT INSERTION WITH HOLIUM LASER LITHOTRIPSY;  Surgeon: Sergio Nowak MD;  Location: [facility] OR       Social History   I have reviewed this patient's social history and updated it with pertinent information if needed.  Social History     Tobacco Use     Smoking status: Former Smoker     Packs/day: 1.00     Years: 40.00     Pack years: 40.00     Quit date:      Years since quittin.4     Smokeless tobacco: Never Used   Substance Use Topics     Alcohol use: Not Currently     Drug use: Never       Family History   I have reviewed this patient's family  "    Stomach Cancer Paternal Grandfather               Allergies:     Allergies   Allergen Reactions     Shrimp Hives     Possible shellfish allergy.              Medications:     Current Outpatient Medications   Medication Sig Dispense Refill     levothyroxine (SYNTHROID) 137 MCG tablet Alternate a daily dosage of 68.5 mcg and 62.5 mcg daily 30 tablet 1     levothyroxine (SYNTHROID/LEVOTHROID) 125 MCG tablet Alternate a daily dosage of 68.5 mcg and 62.5 mcg daily 30 tablet 1             Review of Systems:   Gen: Negative  Eye: Negative  ENT: Negative  Pulmonary:  Negative  Cardio: Negative  Gastrointestinal: Negative  Hematologic: Negative  Genitourinary: Negative  Musculoskeletal: Negative  Psychiatric: Negative  Neurologic: Negative  Skin: Negative  Endocrine: see HPI.            Physical Exam:   Blood pressure 94/61, pulse 117, height 1.407 m (4' 7.39\"), weight 28.6 kg (63 lb 0.8 oz).  Blood pressure percentiles are 29 % systolic and 54 % diastolic based on the 2017 AAP Clinical Practice Guideline. Blood pressure percentile targets: 90: 112/73, 95: 116/75, 95 + 12 mmH/87. This reading is in the normal blood pressure range.  Height: 140.7 cm   82 %ile (Z= 0.92) based on CDC (Girls, 2-20 Years) Stature-for-age data based on Stature recorded on 3/27/2023.  Weight: 28.6 kg (actual weight), 38 %ile (Z= -0.32) based on CDC (Girls, 2-20 Years) weight-for-age data using vitals from 3/27/2023.  BMI: Body mass index is 14.45 kg/m . 12 %ile (Z= -1.17) based on CDC (Girls, 2-20 Years) BMI-for-age based on BMI available as of 3/27/2023.      Constitutional: awake, alert, cooperative, no apparent distress  Eyes: Lids and lashes normal, sclera clear, conjunctiva normal  ENT: Normocephalic, without obvious abnormality, external ears without lesions,   Neck: Supple, symmetrical, trachea midline, thyroid symmetric, not enlarged and no tenderness  Hematologic / Lymphatic: no cervical lymphadenopathy  Lungs: No increased work " history and updated it with pertinent information if needed.  Family History   Problem Relation Age of Onset     Heart Failure Mother      Arthritis Mother      Bleeding Disorder Mother      Bronchitis Mother      Depression Mother      Hyperlipidemia Mother      Obesity Mother      Sleep Apnea Mother      Uterine Cancer Mother      Diabetes Father      Hypertension Father      Bleeding Disorder Father      Bronchitis Father      Diabetes Type 1 Father      Malignant Hypertension Father      Sleep Apnea Father      Cancer Sister         Lung mestatized     Lung Cancer Sister      Hypertension Sister      Gout Brother      Hypertension Brother        Medications   Medications Prior to Admission   Medication Sig Dispense Refill Last Dose     albuterol (PROAIR HFA/PROVENTIL HFA/VENTOLIN HFA) 108 (90 Base) MCG/ACT inhaler Inhale 1-2 puffs into the lungs 4 times daily as needed   6/14/2022     Ascorbic Acid (VITAMIN C) 500 MG CAPS Take 500 mg by mouth daily   Past Week     diphenhydrAMINE (BENADRYL) 25 MG tablet Take 25 mg by mouth nightly as needed for itching or allergies   Past Month     fish oil-omega-3 fatty acids 1000 MG capsule Take 1 g by mouth daily   Past Week     FLUoxetine (PROZAC) 10 MG capsule TAKE ONE CAPSULE BY MOUTH TWICE DAILY 180 capsule 0 6/15/2022 at am     fluticasone (FLONASE) 50 MCG/ACT nasal spray Spray 2 sprays into both nostrils daily   6/15/2022     ibuprofen (ADVIL/MOTRIN) 200 MG tablet Take 600 mg by mouth every 6 hours as needed for mild pain   6/13/2022     loratadine (CLARITIN) 10 MG tablet Take 10 mg by mouth daily as needed for allergies   Past Week     simvastatin (ZOCOR) 40 MG tablet Take 40 mg by mouth At Bedtime   6/14/2022     triamterene-HCTZ (MAXZIDE-25) 37.5-25 MG tablet Take 0.5 tablets by mouth daily   6/14/2022     vitamin B6 (PYRIDOXINE) 100 MG tablet Take 100 mg by mouth daily   Past Week     vitamin D3 (CHOLECALCIFEROL) 50 mcg (2000 units) tablet Take 3 tablets by mouth  daily   Past Week     vitamin E 400 units TABS Take 400 Units by mouth daily   Past Week       Allergies   Allergies   Allergen Reactions     Erythromycin Nausea       Physical Exam   Vital Signs: Temp: 98.6  F (37  C) Temp src: Oral BP: 98/55 Pulse: 80   Resp: 16 SpO2: 96 % O2 Device: Nasal cannula Oxygen Delivery: 2 LPM  Weight: 170 lbs 6.4 oz    General: Pleasant, NAD  HEENT:EOMI, AT,NC  CVS:RRR, no edema  RS:CTAB  Abd: Soft, NT,ND  Neurology:Grossly normal  Psy:Approrpiate affect      Data   Results for orders placed or performed during the hospital encounter of 06/15/22 (from the past 24 hour(s))   Hemoglobin   Result Value Ref Range    Hemoglobin 8.5 (L) 11.7 - 15.7 g/dL   XR Chest 2 Views    Narrative    EXAM: XR CHEST 2 VW  LOCATION: [facility]  DATE/TIME: 6/16/2022 4:23 PM    INDICATION: post op percutaneous nephrolithotomy  COMPARISON: Chest radiograph 06/15/2022      Impression    IMPRESSION: Stable cardiomediastinal silhouette. Small left pleural effusion. No focal airspace disease or pneumothorax. No acute bony abnormality.   CT Abdomen Pelvis w/o Contrast    Narrative    EXAM: CT ABDOMEN PELVIS W/O CONTRAST  LOCATION: [facility]  DATE/TIME: 6/16/2022 4:07 PM    INDICATION: Left flank pain and hemoglobin drop from 13.6 to 8.5 status post 06/15/2022 percutaneous nephrolithotomy for symptomatic renal stones in a patient with known normal variant prominent left renal pelvis.  COMPARISON: 06/15/2022 left nephrolithotomy imaging, 05/19/2022 nuclear medicine renogram and 04/25/2022 contrast enhanced CT AP.  TECHNIQUE: CT scan of the abdomen and pelvis was performed without IV contrast. Multiplanar reformats were obtained. Dose reduction techniques were used.  CONTRAST: None.    FINDINGS:   LOWER CHEST: Near complete atelectasis basilar segments left lower lobe and trace left pleural effusion have developed. Slight increase in the mild platelike atelectasis  of breathing, clear to auscultation bilaterally with good air entry.  Cardiovascular: Regular rate and rhythm, no murmurs.  Abdomen: No scars, soft, non-distended, non-tender, no masses palpated, no hepatosplenomegaly  Genitourinary:  Breasts: afshin 1  Genitalia: normal external female  Pubic hair: Afshin stage 1  Musculoskeletal: There is no redness, warmth, or swelling of the joints.    Neurologic: Awake, alert, oriented to name, place and time.  Neuropsychiatric: normal  Skin: no lesions          Laboratory results:     TSH   Date Value Ref Range Status   03/17/2023 0.24 (L) 0.60 - 4.80 uIU/mL Final   11/11/2022 9.92 (H) 0.40 - 4.00 mU/L Final     Free T4   Date Value Ref Range Status   03/17/2023 1.84 (H) 1.00 - 1.70 ng/dL Final   01/31/2023 1.74 (H) 1.00 - 1.70 ng/dL Final   11/11/2022 1.16 0.76 - 1.46 ng/dL Final   09/13/2022 1.27 0.76 - 1.46 ng/dL Final   08/04/2022 1.03 0.76 - 1.46 ng/dL Final              Assessment and Plan:   Michelle is a 7 year old 4 month old female with Hashimoto's hypothyroidism.     Michelle has had need for multiple levothyroxine dosage increases.  Her recent TSH values have been mildly low with mildly elevated Free T4 values.  Previous dosing of 62.5 mcg required dosage increase.  Today we discussed recommendations to alternate a daily dosage of 62.5 mcg and 68.5 mcg.  Labs needed in 4-6 weeks from dosage increase.  Endocrine follow up in 6 months recommended.           Orders Placed This Encounter   Procedures     TSH     T4 free       PLAN:  Patient Instructions   1.  We reviewed results of recent thyroid lab testing which still shows a mildly low TSH and mildly elevated Free T4.    2. I recommend starting an alternating dosage of levothyroxine taking 68.5 mcg and 62.5 mcg.  3. Growth is normal.  Weight is still normal.   4. Follow up in 6 months, please.       Thank you for allowing me to participate in the care of your patient.  Please do not hesitate to call with questions or  basilar segments right lower lobe posteriorly. Heart size normal with no pericardial   effusion. Decreased density intracardiac blood pool consistent with known anemia. Small esophageal hiatal hernia.    HEPATOBILIARY: Liver is normal. No bile duct dilatation. Cholecystectomy.    PANCREAS: Normal.    SPLEEN: Spleen size normal.    ADRENAL GLANDS: Normal.    RIGHT KIDNEY AND URETER: Normal.    LEFT KIDNEY AND URETER: Postop change from interval left percutaneous nephrolithotomy, stone clearance and placement of a well-positioned internal left ureteral stent with development of postprocedure blood products scattered throughout the left   perinephric fat and also within the chronically distended left intrarenal collecting system.    BLADDER: Small amount of clot material in the otherwise normal appearing bladder. Distal loop left ureteral stent and Ramsay catheter within the bladder.    BOWEL: Normal appendix. Colonic diverticulosis. Bowel is otherwise normal with no obstruction or inflammatory change.    LYMPH NODES: No lymphadenopathy.    VASCULATURE: Normal caliber abdominal aorta.      PELVIC ORGANS: No pelvic mass or fluid.    MUSCULOSKELETAL: Unremarkable.      Impression    IMPRESSION:   1.  Postop change from interval left percutaneous nephrolithotomy, stone clearance and placement of a well-positioned internal left ureteral stent with development of postprocedure blood products scattered throughout the left perinephric fat, the   chronically distended left intrarenal collecting system and the bladder.  2.  Kidneys, ureters and bladder otherwise normal.  3.  Near complete atelectasis basilar segments left lower lobe and trace left pleural effusion have developed.   4.  Decreased density intracardiac blood pool consistent with known anemia.   5.  Small esophageal hiatal hernia.  6.  Cholecystectomy.     Hemoglobin   Result Value Ref Range    Hemoglobin 7.5 (L) 11.7 - 15.7 g/dL   CBC with platelets   Result Value  concerns.    Sincerely,    SHAKILA Mccarty, CNP  Pediatric Endocrinology  [facility]  [facility]  [phone]      25 minutes spent on the date of the encounter doing chart review, review of outside records, review of test results, interpretation of tests, patient visit, documentation and discussion with family       Please do not hesitate to contact me if you have any questions/concerns.     Sincerely,       SHAKILA Blanca CNP     Ref Range    WBC Count 14.0 (H) 4.0 - 11.0 10e3/uL    RBC Count 2.51 (L) 3.80 - 5.20 10e6/uL    Hemoglobin 7.6 (L) 11.7 - 15.7 g/dL    Hematocrit 23.7 (L) 35.0 - 47.0 %    MCV 94 78 - 100 fL    MCH 30.3 26.5 - 33.0 pg    MCHC 32.1 31.5 - 36.5 g/dL    RDW 14.0 10.0 - 15.0 %    Platelet Count 139 (L) 150 - 450 10e3/uL   Basic metabolic panel   Result Value Ref Range    Sodium 139 136 - 145 mmol/L    Potassium 3.9 3.5 - 5.0 mmol/L    Chloride 111 (H) 98 - 107 mmol/L    Carbon Dioxide (CO2) 24 22 - 31 mmol/L    Anion Gap 4 (L) 5 - 18 mmol/L    Urea Nitrogen 16 8 - 22 mg/dL    Creatinine 1.21 (H) 0.60 - 1.10 mg/dL    Calcium 8.4 (L) 8.5 - 10.5 mg/dL    Glucose 115 70 - 125 mg/dL    GFR Estimate 49 (L) >60 mL/min/1.73m2   Prepare red blood cells (unit)   Result Value Ref Range    CROSSMATCH Compatible     UNIT ABO/RH A Pos     Unit Number [id number]     Unit Status Ready     Blood Component Type Red Blood Cells     Product Code K2049G14     CODING SYSTEM [id number]     UNIT TYPE ISBT 6200

## 2023-04-03 DIAGNOSIS — F32.0 MILD MAJOR DEPRESSION (H): ICD-10-CM

## 2023-04-05 RX ORDER — FLUOXETINE 10 MG/1
CAPSULE ORAL
Qty: 180 CAPSULE | Refills: 1 | Status: SHIPPED | OUTPATIENT
Start: 2023-04-05 | End: 2023-09-08

## 2023-04-05 NOTE — TELEPHONE ENCOUNTER
Routing to provider as refill request for review/approval because:  PHQ-9 greater than 5      LOV 3/14/23    Rupa Lima RN  [facility]

## 2023-06-15 DIAGNOSIS — I10 PRIMARY HYPERTENSION: ICD-10-CM

## 2023-06-15 DIAGNOSIS — E78.2 MIXED HYPERLIPIDEMIA: ICD-10-CM

## 2023-06-15 RX ORDER — SIMVASTATIN 40 MG
TABLET ORAL
Qty: 90 TABLET | Refills: 3 | Status: SHIPPED | OUTPATIENT
Start: 2023-06-15 | End: 2023-09-08

## 2023-06-15 RX ORDER — TRIAMTERENE/HYDROCHLOROTHIAZID 37.5-25 MG
TABLET ORAL
Qty: 45 TABLET | Refills: 3 | Status: SHIPPED | OUTPATIENT
Start: 2023-06-15 | End: 2023-09-08

## 2023-06-15 NOTE — TELEPHONE ENCOUNTER
"Last Written Prescription Date:  10/5/22  Last Fill Quantity: 45,  # refills: 3   Last office visit provider:  Holly 9/15/22    Requested Prescriptions   Pending Prescriptions Disp Refills                                                                                                     triamterene-HCTZ (MAXZIDE-25) 37.5-25 MG tablet [Pharmacy Med Name: TRIAMTERENE/HYDROCHLOROTHIAZIDE 37.5-25 TABLET] 45 tablet 3     Sig: TAKE 0.5 TABLET BY MOUTH ONCE DAILY       Diuretics (Including Combos) Protocol Passed - 6/15/2023  9:53 AM        Passed - Blood pressure under 140/90 in past 12 months     BP Readings from Last 3 Encounters:   03/14/23 128/78   09/15/22 124/72   06/28/22 129/71                 Passed - Recent (12 mo) or future (30 days) visit within the authorizing provider's specialty     Patient has had an office visit with the authorizing provider or a provider within the authorizing providers department within the previous 12 mos or has a future within next 30 days. See \"Patient Info\" tab in inbasket, or \"Choose Columns\" in Meds & Orders section of the refill encounter.              Passed - Medication is active on med list        Passed - Patient is age 18 or older        Passed - No active pregancy on record        Passed - Normal serum creatinine on file in past 12 months     Recent Labs   Lab Test 03/14/23  1359   CR 0.86              Passed - Normal serum potassium on file in past 12 months     Recent Labs   Lab Test 03/14/23  1359   POTASSIUM 3.8                    Passed - Normal serum sodium on file in past 12 months     Recent Labs   Lab Test 03/14/23  1359                 Passed - No positive pregnancy test in past 12 months             Clarence Goode, RN 06/15/23 12:17 PM  "

## 2023-06-15 NOTE — TELEPHONE ENCOUNTER
Routing to provider as refill request for review/approval because:    LOV 9/15/22     Rupa Lima RN  [facility]

## 2023-09-08 DIAGNOSIS — F32.0 MILD MAJOR DEPRESSION (H): ICD-10-CM

## 2023-09-08 DIAGNOSIS — I10 PRIMARY HYPERTENSION: ICD-10-CM

## 2023-09-08 DIAGNOSIS — E78.2 MIXED HYPERLIPIDEMIA: ICD-10-CM

## 2023-09-08 RX ORDER — SIMVASTATIN 40 MG
TABLET ORAL
Qty: 30 TABLET | Refills: 0 | Status: SHIPPED | OUTPATIENT
Start: 2023-09-08 | End: 2023-09-25

## 2023-09-08 RX ORDER — TRIAMTERENE/HYDROCHLOROTHIAZID 37.5-25 MG
TABLET ORAL
Qty: 15 TABLET | Refills: 0 | Status: SHIPPED | OUTPATIENT
Start: 2023-09-08 | End: 2023-09-25

## 2023-09-08 RX ORDER — FLUOXETINE 10 MG/1
CAPSULE ORAL
Qty: 60 CAPSULE | Refills: 0 | Status: SHIPPED | OUTPATIENT
Start: 2023-09-08 | End: 2023-09-25

## 2023-09-08 NOTE — TELEPHONE ENCOUNTER
Routing refill request to provider for review/approval because:  Drug interaction warning        Fluoxetine Last Written Prescription Date: 4/5/23  Simvastatin, maxzide last written 6/15/23  Last office visit: 3/14/2023    Future Office Visit:   Next 5 appointments (look out 90 days)      Sep 25, 2023 11:30 AM  (Arrive by 11:10 AM)  Annual Wellness Visit with Mark Barrera MD  [facility] ([facility] ) [address] [place] [id number] [phone]

## 2023-09-08 NOTE — TELEPHONE ENCOUNTER
Patient was wondering if she might be able to get a bridge Rx to get her by until her appointment on 09/25/2023.  ---    Medication Question or Refill  What medication are you calling about (include dose and sig)?:       Preferred Pharmacy:  [place] [address] [address]  Phone: [phone] Fax: [phone]      Controlled Substance Agreement on file:   CSA -- Patient Level:    CSA: None found at the patient level.       Who prescribed the medication?: JDL    Do you need a refill? Yes    Patient offered an appointment? Yes: I spoke to the patient and we made an appointment for an Annual Wellness Visit/Med Check on 09/25/2023    Do you have any questions or concerns?  Patient was wondering if she might be able to get a bridge Rx to get her by until her appointment on 09/25/2023    Okay to leave a detailed message?: Yes at Cell number on file:    Telephone Information:   Mobile [phone]

## 2023-09-25 ENCOUNTER — OFFICE VISIT (OUTPATIENT)
Dept: FAMILY MEDICINE | Facility: CLINIC | Age: 68
End: 2023-09-25
Payer: MEDICARE

## 2023-09-25 VITALS
RESPIRATION RATE: 20 BRPM | SYSTOLIC BLOOD PRESSURE: 124 MMHG | WEIGHT: 181 LBS | DIASTOLIC BLOOD PRESSURE: 74 MMHG | HEART RATE: 72 BPM | BODY MASS INDEX: 32.07 KG/M2 | TEMPERATURE: 96.7 F | HEIGHT: 63 IN | OXYGEN SATURATION: 97 %

## 2023-09-25 DIAGNOSIS — I35.0 NONRHEUMATIC AORTIC VALVE STENOSIS: ICD-10-CM

## 2023-09-25 DIAGNOSIS — Z12.11 SCREEN FOR COLON CANCER: ICD-10-CM

## 2023-09-25 DIAGNOSIS — J31.0 CHRONIC RHINITIS: ICD-10-CM

## 2023-09-25 DIAGNOSIS — I10 PRIMARY HYPERTENSION: ICD-10-CM

## 2023-09-25 DIAGNOSIS — E78.2 MIXED HYPERLIPIDEMIA: ICD-10-CM

## 2023-09-25 DIAGNOSIS — J44.9 MODERATE COPD (CHRONIC OBSTRUCTIVE PULMONARY DISEASE) (H): ICD-10-CM

## 2023-09-25 DIAGNOSIS — F32.0 MILD MAJOR DEPRESSION (H): ICD-10-CM

## 2023-09-25 DIAGNOSIS — E66.09 CLASS 1 OBESITY DUE TO EXCESS CALORIES WITH SERIOUS COMORBIDITY AND BODY MASS INDEX (BMI) OF 30.0 TO 30.9 IN ADULT: ICD-10-CM

## 2023-09-25 DIAGNOSIS — Z12.31 VISIT FOR SCREENING MAMMOGRAM: ICD-10-CM

## 2023-09-25 DIAGNOSIS — E66.811 CLASS 1 OBESITY DUE TO EXCESS CALORIES WITH SERIOUS COMORBIDITY AND BODY MASS INDEX (BMI) OF 30.0 TO 30.9 IN ADULT: ICD-10-CM

## 2023-09-25 DIAGNOSIS — Z87.891 PERSONAL HISTORY OF TOBACCO USE: ICD-10-CM

## 2023-09-25 DIAGNOSIS — Z00.00 ENCOUNTER FOR MEDICARE ANNUAL WELLNESS EXAM: Primary | ICD-10-CM

## 2023-09-25 DIAGNOSIS — H90.3 BILATERAL SENSORINEURAL HEARING LOSS: ICD-10-CM

## 2023-09-25 PROBLEM — R87.610 ATYPICAL SQUAMOUS CELLS OF UNDETERMINED SIGNIFICANCE (ASCUS) ON PAPANICOLAOU SMEAR OF CERVIX: Status: RESOLVED | Noted: 2022-04-25 | Resolved: 2023-09-25

## 2023-09-25 LAB
CHOLEST SERPL-MCNC: 141 MG/DL
HDLC SERPL-MCNC: 69 MG/DL
LDLC SERPL CALC-MCNC: 63 MG/DL
NONHDLC SERPL-MCNC: 72 MG/DL
TRIGL SERPL-MCNC: 46 MG/DL

## 2023-09-25 PROCEDURE — 80061 LIPID PANEL: CPT | Performed by: INTERNAL MEDICINE

## 2023-09-25 PROCEDURE — G0296 VISIT TO DETERM LDCT ELIG: HCPCS | Performed by: INTERNAL MEDICINE

## 2023-09-25 PROCEDURE — G0439 PPPS, SUBSEQ VISIT: HCPCS | Performed by: INTERNAL MEDICINE

## 2023-09-25 PROCEDURE — 90662 IIV NO PRSV INCREASED AG IM: CPT | Performed by: INTERNAL MEDICINE

## 2023-09-25 PROCEDURE — G0008 ADMIN INFLUENZA VIRUS VAC: HCPCS | Performed by: INTERNAL MEDICINE

## 2023-09-25 PROCEDURE — 99214 OFFICE O/P EST MOD 30 MIN: CPT | Mod: 25 | Performed by: INTERNAL MEDICINE

## 2023-09-25 PROCEDURE — 36415 COLL VENOUS BLD VENIPUNCTURE: CPT | Performed by: INTERNAL MEDICINE

## 2023-09-25 RX ORDER — SIMVASTATIN 40 MG
TABLET ORAL
Qty: 90 TABLET | Refills: 3 | Status: SHIPPED | OUTPATIENT
Start: 2023-09-25 | End: 2024-09-30

## 2023-09-25 RX ORDER — TRIAMTERENE/HYDROCHLOROTHIAZID 37.5-25 MG
TABLET ORAL
Qty: 90 TABLET | Refills: 3 | Status: SHIPPED | OUTPATIENT
Start: 2023-09-25 | End: 2024-09-30

## 2023-09-25 RX ORDER — FLUTICASONE PROPIONATE 50 MCG
SPRAY, SUSPENSION (ML) NASAL
Qty: 16 ML | Refills: 5 | Status: SHIPPED | OUTPATIENT
Start: 2023-09-25 | End: 2024-09-30

## 2023-09-25 RX ORDER — FLUOXETINE 10 MG/1
CAPSULE ORAL
Qty: 180 CAPSULE | Refills: 3 | Status: SHIPPED | OUTPATIENT
Start: 2023-09-25 | End: 2024-09-30

## 2023-09-25 ASSESSMENT — ENCOUNTER SYMPTOMS
HEARTBURN: 0
CONSTIPATION: 0
DIARRHEA: 0
SORE THROAT: 0
ABDOMINAL PAIN: 0
HEADACHES: 0
EYE PAIN: 0
COUGH: 1
BREAST MASS: 0
SHORTNESS OF BREATH: 1
WEAKNESS: 0
HEMATURIA: 0
NAUSEA: 0
DIZZINESS: 0
JOINT SWELLING: 1
CHILLS: 0
DYSURIA: 0
NERVOUS/ANXIOUS: 1
PARESTHESIAS: 0
NERVOUS/ANXIOUS: 0
PALPITATIONS: 0
MYALGIAS: 0
ARTHRALGIAS: 0
HEMATOCHEZIA: 0
FREQUENCY: 0
FEVER: 0

## 2023-09-25 ASSESSMENT — PATIENT HEALTH QUESTIONNAIRE - PHQ9
SUM OF ALL RESPONSES TO PHQ QUESTIONS 1-9: 2
10. IF YOU CHECKED OFF ANY PROBLEMS, HOW DIFFICULT HAVE THESE PROBLEMS MADE IT FOR YOU TO DO YOUR WORK, TAKE CARE OF THINGS AT HOME, OR GET ALONG WITH OTHER PEOPLE: NOT DIFFICULT AT ALL
SUM OF ALL RESPONSES TO PHQ QUESTIONS 1-9: 2

## 2023-09-25 ASSESSMENT — ACTIVITIES OF DAILY LIVING (ADL): CURRENT_FUNCTION: NO ASSISTANCE NEEDED

## 2023-09-25 NOTE — ASSESSMENT & PLAN NOTE
2014 FEV1 FVC 55%, FEV1 1.32 L or 56% of predicted  Notes cough and dyspnea particularly in the mornings but declines additional therapy  Walks her dog 3 times a day

## 2023-09-25 NOTE — LETTER
September 26, 2023      Pamela Shelton  [address] [facility] [address]        Dear ,    We are writing to inform you of your test results.    Results are acceptable.     Resulted Orders   Lipid panel reflex to direct LDL Fasting   Result Value Ref Range    Cholesterol 141 <200 mg/dL    Triglycerides 46 <150 mg/dL    Direct Measure HDL 69 >=50 mg/dL    LDL Cholesterol Calculated 63 <=100 mg/dL    Non HDL Cholesterol 72 <130 mg/dL    Narrative    Cholesterol  Desirable:  <200 mg/dL    Triglycerides  Normal:  Less than 150 mg/dL  Borderline High:  150-199 mg/dL  High:  200-499 mg/dL  Very High:  Greater than or equal to 500 mg/dL    Direct Measure HDL  Female:  Greater than or equal to 50 mg/dL   Male:  Greater than or equal to 40 mg/dL    LDL Cholesterol  Desirable:  <100mg/dL  Above Desirable:  100-129 mg/dL   Borderline High:  130-159 mg/dL   High:  160-189 mg/dL   Very High:  >= 190 mg/dL    Non HDL Cholesterol  Desirable:  130 mg/dL  Above Desirable:  130-159 mg/dL  Borderline High:  160-189 mg/dL  High:  190-219 mg/dL  Very High:  Greater than or equal to 220 mg/dL       If you have any questions or concerns, please call the clinic at the number listed above.       Sincerely,      Mark Barrera MD

## 2023-09-25 NOTE — PROGRESS NOTES
Pt CT order faxed to the [facility] for scheduling per Pt request.  [phone]  Mirza Phelps CMA

## 2023-09-25 NOTE — PATIENT INSTRUCTIONS
Patient Education   Personalized Prevention Plan  You are due for the preventive services outlined below.  Your care team is available to assist you in scheduling these services.  If you have already completed any of these items, please share that information with your care team to update in your medical record.  Health Maintenance Due   Topic Date Due     Depression Action Plan  Never done     Mammogram  Never done     Colorectal Cancer Screening  Never done     LUNG CANCER SCREENING  Never done     COVID-19 Vaccine (5 - Pfizer series) 06/09/2022     Flu Vaccine (1) 09/01/2023     ANNUAL REVIEW OF HM ORDERS  09/15/2023     Learning About Dietary Guidelines  What are the Dietary Guidelines for Americans?     Dietary Guidelines for Americans provide tips for eating well and staying healthy. This helps reduce the risk for long-term (chronic) diseases.  These guidelines recommend that you:  Eat and drink the right amount for you. The U.S. government's food guide is called MyPlate. It can help you make your own well-balanced eating plan.  Try to balance your eating with your activity. This helps you stay at a healthy weight.  Drink alcohol in moderation, if at all.  Limit foods high in salt, saturated fat, trans fat, and added sugar.  These guidelines are from the U.S. Department of Agriculture and the U.S. Department of Health and Human Services. They are updated every 5 years.  What is MyPlate?  MyPlate is the U.S. government's food guide. It can help you make your own well-balanced eating plan. A balanced eating plan means that you eat enough, but not too much, and that your food gives you the nutrients you need to stay healthy.  MyPlate focuses on eating plenty of whole grains, fruits, and vegetables, and on limiting fat and sugar. It is available online at www.ChooseMyPlate.gov.  How can you get started?  If you're trying to eat healthier, you can slowly change your eating habits over time. You don't have to make  "big changes all at once. Start by adding one or two healthy foods to your meals each day.  Grains  Choose whole-grain breads and cereals and whole-wheat pasta and whole-grain crackers.  Vegetables  Eat a variety of vegetables every day. They have lots of nutrients and are part of a heart-healthy diet.  Fruits  Eat a variety of fruits every day. Fruits contain lots of nutrients. Choose fresh fruit instead of fruit juice.  Protein foods  Choose fish and lean poultry more often. Eat red meat and fried meats less often. Dried beans, tofu, and nuts are also good sources of protein.  Dairy  Choose low-fat or fat-free products from this food group. If you have problems digesting milk, try eating cheese or yogurt instead.  Fats and oils  Limit fats and oils if you're trying to cut calories. Choose healthy fats when you cook. These include canola oil and olive oil.  Where can you learn more?  Go to https://www.Tansler.net/patiented  Enter D676 in the search box to learn more about \"Learning About Dietary Guidelines.\"  Current as of: March 1, 2023               Content Version: 13.7    2825-0591 [employer].   Care instructions adapted under license by your healthcare professional. If you have questions about a medical condition or this instruction, always ask your healthcare professional. [employer] disclaims any warranty or liability for your use of this information.      Hearing Loss: Care Instructions  Overview     Hearing loss is a sudden or slow decrease in how well you hear. It can range from slight to profound. Permanent hearing loss can occur with aging. It also can happen when you are exposed long-term to loud noise. Examples include listening to loud music, riding motorcycles, or being around other loud machines.  Hearing loss can affect your work and home life. It can make you feel lonely or depressed. You may feel that you have lost your independence. But hearing aids and other " devices can help you hear better and feel connected to others.  Follow-up care is a key part of your treatment and safety. Be sure to make and go to all appointments, and call your doctor if you are having problems. It's also a good idea to know your test results and keep a list of the medicines you take.  How can you care for yourself at home?  Avoid loud noises whenever possible. This helps keep your hearing from getting worse.  Always wear hearing protection around loud noises.  Wear a hearing aid as directed.  A professional can help you pick a hearing aid that will work best for you.  You can also get hearing aids over the counter for mild to moderate hearing loss.  Have hearing tests as your doctor suggests. They can show whether your hearing has changed. Your hearing aid may need to be adjusted.  Use other devices as needed. These may include:  Telephone amplifiers and hearing aids that can connect to a television, stereo, radio, or microphone.  Devices that use lights or vibrations. These alert you to the doorbell, a ringing telephone, or a baby monitor.  Television closed-captioning. This shows the words at the bottom of the screen. Most new TVs can do this.  TTY (text telephone). This lets you type messages back and forth on the telephone instead of talking or listening. These devices are also called TDD. When messages are typed on the keyboard, they are sent over the phone line to a receiving TTY. The message is shown on a monitor.  Use text messaging, social media, and email if it is hard for you to communicate by telephone.  Try to learn a listening technique called speechreading. It is not lipreading. You pay attention to people's gestures, expressions, posture, and tone of voice. These clues can help you understand what a person is saying. Face the person you are talking to, and have them face you. Make sure the lighting is good. You need to see the other person's face clearly.  Think about counseling  "if you need help to adjust to your hearing loss.  When should you call for help?  Watch closely for changes in your health, and be sure to contact your doctor if:    You think your hearing is getting worse.     You have new symptoms, such as dizziness or nausea.   Where can you learn more?  Go to https://www.[employer].net/patiented  Enter R798 in the search box to learn more about \"Hearing Loss: Care Instructions.\"  Current as of: March 1, 2023               Content Version: 13.7    5684-9493 [employer].   Care instructions adapted under license by your healthcare professional. If you have questions about a medical condition or this instruction, always ask your healthcare professional. [employer] disclaims any warranty or liability for your use of this information.         Lung Cancer Screening   Frequently Asked Questions  If you are at high-risk for lung cancer, getting screened with low-dose computed tomography (LDCT) every year can help save your life. This handout offers answers to some of the most common questions about lung cancer screening. If you have other questions, please call [phone] ([phone]).     What is it?  Lung cancer screening uses special X-ray technology to create an image of your lung tissue. The exam is quick and easy and takes less than 10 seconds. We don t give you any medicine or use any needles. You can eat before and after the exam. You don t need to change your clothes as long as the clothing on your chest doesn t contain metal. But, you do need to be able to hold your breath for at least 6 seconds during the exam.    What is the goal of lung cancer screening?  The goal of lung cancer screening is to save lives. Many times, lung cancer is not found until a person starts having physical symptoms. Lung cancer screening can help detect lung cancer in the earliest stages when it may be easier to treat.    Who should be screened for lung " cancer?  We suggest lung cancer screening for anyone who is at high-risk for lung cancer. You are in the high-risk group if you:      are between the ages of 55 and 79, and    have smoked at least 1 pack of cigarettes a day for 20 or more years, and    still smoke or have quit within the past 15 years.    However, if you have a new cough or shortness of breath, you should talk to your doctor before being screened.    Why does it matter if I have symptoms?  Certain symptoms can be a sign that you have a condition in your lungs that should be checked and treated by your doctor. These symptoms include fever, chest pain, a new or changing cough, shortness of breath that you have never felt before, coughing up blood or unexplained weight loss. Having any of these symptoms can greatly affect the results of lung cancer screening.       Should all smokers get an LDCT lung cancer screening exam?  It depends. Lung cancer screening is for a very specific group of men and women who have a history of heavy smoking over a long period of time (see  Who should be screened for lung cancer  above).  I am in the high-risk group, but have been diagnosed with cancer in the past. Is LDCT lung cancer screening right for me?  In some cases, you should not have LDCT lung screening, such as when your doctor is already following your cancer with CT scan studies. Your doctor will help you decide if LDCT lung screening is right for you.  Do I need to have a screening exam every year?  Yes. If you are in the high-risk group described earlier, you should get an LDCT lung cancer screening exam every year until you are 79, or are no longer willing or able to undergo screening and possible procedures to diagnose and treat lung cancer.  How effective is LDCT at preventing death from lung cancer?  Studies have shown that LDCT lung cancer screening can lower the risk of death from lung cancer by 20 percent in people who are at high-risk.  What are the  risks?  There are some risks and limitations of LDCT lung cancer screening. We want to make sure you understand the risks and benefits, so please let us know if you have any questions. Your doctor may want to talk with you more about these risks.    Radiation exposure: As with any exam that uses radiation, there is a very small increased risk of cancer. The amount of radiation in LDCT is small--about the same amount a person would get from a mammogram. Your doctor orders the exam when he or she feels the potential benefits outweigh the risks.    False negatives: No test is perfect, including LDCT. It is possible that you may have a medical condition, including lung cancer, that is not found during your exam. This is called a false negative result.    False positives and more testing: LDCT very often finds something in the lung that could be cancer, but in fact is not. This is called a false positive result. False positive tests often cause anxiety. To make sure these findings are not cancer, you may need to have more tests. These tests will be done only if you give us permission. Sometimes patients need a treatment that can have side effects, such as a biopsy. For more information on false positives, see  What can I expect from the results?     Findings not related to lung cancer: Your LDCT exam also takes pictures of areas of your body next to your lungs. In a very small number of cases, the CT scan will show an abnormal finding in one of these areas, such as your kidneys, adrenal glands, liver or thyroid. This finding may not be serious, but you may need more tests. Your doctor can help you decide what other tests you may need, if any.  What can I expect from the results?  About 1 out of 4 LDCT exams will find something that may need more tests. Most of the time, these findings are lung nodules. Lung nodules are very small collections of tissue in the lung. These nodules are very common, and the vast  majority--more than 97 percent--are not cancer (benign). Most are normal lymph nodes or small areas of scarring from past infections.  But, if a small lung nodule is found to be cancer, the cancer can be cured more than 90 percent of the time. To know if the nodule is cancer, we may need to get more images before your next yearly screening exam. If the nodule has suspicious features (for example, it is large, has an odd shape or grows over time), we will refer you to a specialist for further testing.  Will my doctor also get the results?  Yes. Your doctor will get a copy of your results.  Is it okay to keep smoking now that there s a cancer screening exam?  No. Tobacco is one of the strongest cancer-causing agents. It causes not only lung cancer, but other cancers and cardiovascular (heart) diseases as well. The damage caused by smoking builds over time. This means that the longer you smoke, the higher your risk of disease. While it is never too late to quit, the sooner you quit, the better.  Where can I find help to quit smoking?  The best way to prevent lung cancer is to stop smoking. If you have already quit smoking, congratulations and keep it up! For help on quitting smoking, please call [employer] at 8-204-QUITNOW (1-427.169.8809) or the American Cancer Society at 1-157.798.7351 to find local resources near you.  One-on-one health coaching:  If you d prefer to work individually with a health care provider on tobacco cessation, we offer:      Medication Therapy Management:  Our specially trained pharmacists work closely with you and your doctor to help you quit smoking.  Call [phone] or [phone] (toll free).

## 2023-09-25 NOTE — PROGRESS NOTES
The patient was counseled and encouraged to consider modifying their diet and eating habits. She was provided with information on recommended healthy diet options.  The patient was provided with written information regarding signs of hearing loss.  Lung Cancer Screening Shared Decision Making Visit     Pamela Shelton, a 68 year old female, is eligible for lung cancer screening    History   Smoking Status     Former     Packs/day: 1.00     Years: 40.00     Types: Cigarettes     Quit date: 2013   Smokeless Tobacco     Never       I have discussed with patient the risks and benefits of screening for lung cancer with low-dose CT.     The risks include:    radiation exposure: one low dose chest CT has as much ionizing radiation as about 15 chest x-rays, or 6 months of background radiation living in [place]      false positives: most findings/nodules are NOT cancer, but some might still require additional diagnostic evaluation, including biopsy    over-diagnosis: some slow growing cancers that might never have been clinically significant will be detected and treated unnecessarily     The benefit of early detection of lung cancer is contingent upon adherence to annual screening or more frequent follow up if indicated.     Furthermore, to benefit from screening, Pamela must be willing and able to undergo diagnostic procedures, if indicated. Although no specific guide is available for determining severity of comorbidities, it is reasonable to withhold screening in patients who have greater mortality risk from other diseases.     We did discuss that the best way to prevent lung cancer is to not smoke.    Some patients may value a numeric estimation of lung cancer risk when evaluating if lung cancer screening is right for them, here is one calculator:    ShouldIScreen

## 2023-10-09 ENCOUNTER — TELEPHONE (OUTPATIENT)
Dept: FAMILY MEDICINE | Facility: CLINIC | Age: 68
End: 2023-10-09
Payer: MEDICARE

## 2023-10-09 NOTE — TELEPHONE ENCOUNTER
I called pt and gave her JDL result message.  Pt had no other questions or concerns at this time.  Mirza Phelps CMA

## 2023-10-09 NOTE — TELEPHONE ENCOUNTER
Test Results    Contacts         Type Contact Phone/Fax    10/09/2023 08:08 AM CDT Phone (Incoming) Pamela Shelton (Self) [phone] (M)            Who ordered the test:  Dr. Barrera     Type of test: Lab and CT    Date of test:  10/5/23    Where was the test performed:  [facility]    What are your questions/concerns?:  Patient is requesting results. Patient does not have My Chart    Okay to leave a detailed message?: Yes at Cell number on file:    Telephone Information:   Mobile [phone]

## 2023-10-09 NOTE — TELEPHONE ENCOUNTER
CT shows no concerning findings.  A few small benign-appearing nodules.  Repeat CT scan in a year.  Lipid panel shows excellent results.  She will be receiving a letter with the results.

## 2023-10-12 DIAGNOSIS — J44.9 MODERATE COPD (CHRONIC OBSTRUCTIVE PULMONARY DISEASE) (H): ICD-10-CM

## 2023-10-12 RX ORDER — ALBUTEROL SULFATE 90 UG/1
AEROSOL, METERED RESPIRATORY (INHALATION)
Qty: 17 G | Refills: 5 | Status: SHIPPED | OUTPATIENT
Start: 2023-10-12 | End: 2024-09-30

## 2023-10-12 NOTE — TELEPHONE ENCOUNTER
Prescription approved per [facility] Refill Protocol.  Nadia KRISHNAN RN  [facility]

## 2023-11-28 ENCOUNTER — TELEPHONE (OUTPATIENT)
Dept: FAMILY MEDICINE | Facility: CLINIC | Age: 68
End: 2023-11-28
Payer: MEDICARE

## 2023-11-28 NOTE — TELEPHONE ENCOUNTER
Reason for Call:  Appointment Request    Patient requesting this type of appt:  go over Echo & RSV inj.     Requested provider: Mark Barrera    Reason patient unable to be scheduled:  not sure if Echo results are ready for review    When does patient want to be seen/preferred time:  soon    Comments:     Okay to leave a detailed message?: Yes at Cell number on file:    Telephone Information:   Mobile [phone]       Call taken on 11/28/2023 at 8:55 AM by Goldie Corral

## 2023-11-30 ENCOUNTER — TELEPHONE (OUTPATIENT)
Dept: FAMILY MEDICINE | Facility: CLINIC | Age: 68
End: 2023-11-30
Payer: MEDICARE

## 2023-11-30 DIAGNOSIS — Z29.11 NEED FOR VACCINATION AGAINST RESPIRATORY SYNCYTIAL VIRUS: ICD-10-CM

## 2023-11-30 RX ORDER — RESPIRATORY SYNCYTIAL VIRUS VACCINE 120MCG/0.5
0.5 KIT INTRAMUSCULAR ONCE
Qty: 1 EACH | Refills: 0 | Status: SHIPPED | OUTPATIENT
Start: 2023-11-30 | End: 2023-11-30

## 2023-11-30 NOTE — TELEPHONE ENCOUNTER
Patient is requesting a script for the RSV Vaccine. Please send to [facility] in [place], [place].    Patient is arriving at [facility] for this vaccine at 10:00 this a.m..

## 2023-12-04 ENCOUNTER — TELEPHONE (OUTPATIENT)
Dept: FAMILY MEDICINE | Facility: CLINIC | Age: 68
End: 2023-12-04
Payer: MEDICARE

## 2023-12-04 NOTE — TELEPHONE ENCOUNTER
General Call    Contacts         Type Contact Phone/Fax    11/28/2023 08:54 AM CST Phone (Incoming) Pamela Shelton (Self) [phone] (M)    12/04/2023 09:24 AM CST Phone (Incoming) Shelton Pamela CONTRERAS (Self) [phone] (M)          Reason for Call:  Pt calling again in regards to results. Patient did not want an appointment unless provider wanted her to have one. Wants provider to go over results of her echocardiogram that she had done 11/08 at [facility]. If available for review, please review and call the patient in regards to her results.     Okay to leave a detailed message?: No at Cell number on file:    Telephone Information:   Mobile [phone]

## 2023-12-04 NOTE — TELEPHONE ENCOUNTER
Spoke with patient and gave results per Dr. Barrera. She had no additional questions at this time. Encouraged patient to schedule visit to discuss if she had additional questions or concerns. She expressed understanding.

## 2024-09-30 ENCOUNTER — OFFICE VISIT (OUTPATIENT)
Dept: FAMILY MEDICINE | Facility: CLINIC | Age: 69
End: 2024-09-30
Payer: MEDICARE

## 2024-09-30 VITALS
SYSTOLIC BLOOD PRESSURE: 124 MMHG | WEIGHT: 178 LBS | HEIGHT: 64 IN | BODY MASS INDEX: 30.39 KG/M2 | TEMPERATURE: 97.1 F | DIASTOLIC BLOOD PRESSURE: 68 MMHG | RESPIRATION RATE: 20 BRPM | OXYGEN SATURATION: 95 % | HEART RATE: 64 BPM

## 2024-09-30 DIAGNOSIS — E78.2 MIXED HYPERLIPIDEMIA: ICD-10-CM

## 2024-09-30 DIAGNOSIS — Z12.31 VISIT FOR SCREENING MAMMOGRAM: ICD-10-CM

## 2024-09-30 DIAGNOSIS — Z00.00 ENCOUNTER FOR MEDICARE ANNUAL WELLNESS EXAM: Primary | ICD-10-CM

## 2024-09-30 DIAGNOSIS — J44.9 MODERATE COPD (CHRONIC OBSTRUCTIVE PULMONARY DISEASE) (H): ICD-10-CM

## 2024-09-30 DIAGNOSIS — E66.811 CLASS 1 OBESITY DUE TO EXCESS CALORIES WITH SERIOUS COMORBIDITY AND BODY MASS INDEX (BMI) OF 30.0 TO 30.9 IN ADULT: ICD-10-CM

## 2024-09-30 DIAGNOSIS — I35.1 NONRHEUMATIC AORTIC VALVE INSUFFICIENCY: ICD-10-CM

## 2024-09-30 DIAGNOSIS — H90.6 MIXED HEARING LOSS, BILATERAL: ICD-10-CM

## 2024-09-30 DIAGNOSIS — I10 PRIMARY HYPERTENSION: ICD-10-CM

## 2024-09-30 DIAGNOSIS — J31.0 CHRONIC RHINITIS: ICD-10-CM

## 2024-09-30 DIAGNOSIS — R73.02 IMPAIRED GLUCOSE TOLERANCE: ICD-10-CM

## 2024-09-30 DIAGNOSIS — F32.0 MILD MAJOR DEPRESSION (H): ICD-10-CM

## 2024-09-30 DIAGNOSIS — Z87.891 PERSONAL HISTORY OF TOBACCO USE: ICD-10-CM

## 2024-09-30 DIAGNOSIS — E66.09 CLASS 1 OBESITY DUE TO EXCESS CALORIES WITH SERIOUS COMORBIDITY AND BODY MASS INDEX (BMI) OF 30.0 TO 30.9 IN ADULT: ICD-10-CM

## 2024-09-30 DIAGNOSIS — Z12.11 SCREEN FOR COLON CANCER: ICD-10-CM

## 2024-09-30 PROBLEM — K80.20 CHOLELITHIASIS: Status: RESOLVED | Noted: 2018-04-14 | Resolved: 2024-09-30

## 2024-09-30 PROBLEM — K85.10 ACUTE BILIARY PANCREATITIS: Status: RESOLVED | Noted: 2018-04-14 | Resolved: 2024-09-30

## 2024-09-30 LAB
ANION GAP SERPL CALCULATED.3IONS-SCNC: 9 MMOL/L (ref 7–15)
BUN SERPL-MCNC: 14.4 MG/DL (ref 8–23)
CALCIUM SERPL-MCNC: 9.4 MG/DL (ref 8.8–10.4)
CHLORIDE SERPL-SCNC: 105 MMOL/L (ref 98–107)
CHOLEST SERPL-MCNC: 136 MG/DL
CREAT SERPL-MCNC: 0.84 MG/DL (ref 0.51–0.95)
EGFRCR SERPLBLD CKD-EPI 2021: 74 ML/MIN/1.73M2
FASTING STATUS PATIENT QL REPORTED: YES
FASTING STATUS PATIENT QL REPORTED: YES
GLUCOSE SERPL-MCNC: 105 MG/DL (ref 70–99)
HCO3 SERPL-SCNC: 29 MMOL/L (ref 22–29)
HDLC SERPL-MCNC: 60 MG/DL
LDLC SERPL CALC-MCNC: 67 MG/DL
NONHDLC SERPL-MCNC: 76 MG/DL
POTASSIUM SERPL-SCNC: 3.9 MMOL/L (ref 3.4–5.3)
SODIUM SERPL-SCNC: 143 MMOL/L (ref 135–145)
TRIGL SERPL-MCNC: 46 MG/DL

## 2024-09-30 PROCEDURE — 99214 OFFICE O/P EST MOD 30 MIN: CPT | Mod: 25 | Performed by: INTERNAL MEDICINE

## 2024-09-30 PROCEDURE — G0439 PPPS, SUBSEQ VISIT: HCPCS | Performed by: INTERNAL MEDICINE

## 2024-09-30 PROCEDURE — G0008 ADMIN INFLUENZA VIRUS VAC: HCPCS | Performed by: INTERNAL MEDICINE

## 2024-09-30 PROCEDURE — 80048 BASIC METABOLIC PNL TOTAL CA: CPT | Performed by: INTERNAL MEDICINE

## 2024-09-30 PROCEDURE — 80061 LIPID PANEL: CPT | Performed by: INTERNAL MEDICINE

## 2024-09-30 PROCEDURE — 90662 IIV NO PRSV INCREASED AG IM: CPT | Performed by: INTERNAL MEDICINE

## 2024-09-30 PROCEDURE — 36415 COLL VENOUS BLD VENIPUNCTURE: CPT | Performed by: INTERNAL MEDICINE

## 2024-09-30 RX ORDER — ALBUTEROL SULFATE 90 UG/1
2 AEROSOL, METERED RESPIRATORY (INHALATION) EVERY 4 HOURS PRN
Qty: 17 G | Refills: 11 | Status: SHIPPED | OUTPATIENT
Start: 2024-09-30

## 2024-09-30 RX ORDER — SIMVASTATIN 40 MG
TABLET ORAL
Qty: 90 TABLET | Refills: 3 | Status: SHIPPED | OUTPATIENT
Start: 2024-09-30

## 2024-09-30 RX ORDER — FLUTICASONE PROPIONATE 50 MCG
SPRAY, SUSPENSION (ML) NASAL
Qty: 16 ML | Refills: 5 | Status: SHIPPED | OUTPATIENT
Start: 2024-09-30

## 2024-09-30 RX ORDER — TRIAMTERENE/HYDROCHLOROTHIAZID 37.5-25 MG
TABLET ORAL
Qty: 90 TABLET | Refills: 3 | Status: SHIPPED | OUTPATIENT
Start: 2024-09-30

## 2024-09-30 RX ORDER — FLUOXETINE 10 MG/1
CAPSULE ORAL
Qty: 180 CAPSULE | Refills: 3 | Status: SHIPPED | OUTPATIENT
Start: 2024-09-30

## 2024-09-30 SDOH — HEALTH STABILITY: PHYSICAL HEALTH: ON AVERAGE, HOW MANY DAYS PER WEEK DO YOU ENGAGE IN MODERATE TO STRENUOUS EXERCISE (LIKE A BRISK WALK)?: 7 DAYS

## 2024-09-30 ASSESSMENT — PATIENT HEALTH QUESTIONNAIRE - PHQ9
SUM OF ALL RESPONSES TO PHQ QUESTIONS 1-9: 5
10. IF YOU CHECKED OFF ANY PROBLEMS, HOW DIFFICULT HAVE THESE PROBLEMS MADE IT FOR YOU TO DO YOUR WORK, TAKE CARE OF THINGS AT HOME, OR GET ALONG WITH OTHER PEOPLE: NOT DIFFICULT AT ALL
SUM OF ALL RESPONSES TO PHQ QUESTIONS 1-9: 5

## 2024-09-30 ASSESSMENT — SOCIAL DETERMINANTS OF HEALTH (SDOH): HOW OFTEN DO YOU GET TOGETHER WITH FRIENDS OR RELATIVES?: TWICE A WEEK

## 2024-09-30 NOTE — PROGRESS NOTES
Lung Cancer Screening Shared Decision Making Visit     Pamela Shelton, a 69 year old female, is eligible for lung cancer screening    History   Smoking Status     Former     Packs/day: 1.00     Years: 40.00     Types: Cigarettes     Quit date: 2013   Smokeless Tobacco     Never       I have discussed with patient the risks and benefits of screening for lung cancer with low-dose CT.     The risks include:    radiation exposure: one low dose chest CT has as much ionizing radiation as about 15 chest x-rays, or 6 months of background radiation living in [place]      false positives: most findings/nodules are NOT cancer, but some might still require additional diagnostic evaluation, including biopsy    over-diagnosis: some slow growing cancers that might never have been clinically significant will be detected and treated unnecessarily     The benefit of early detection of lung cancer is contingent upon adherence to annual screening or more frequent follow up if indicated.     Furthermore, to benefit from screening, Pamela must be willing and able to undergo diagnostic procedures, if indicated. Although no specific guide is available for determining severity of comorbidities, it is reasonable to withhold screening in patients who have greater mortality risk from other diseases.     We did discuss that the best way to prevent lung cancer is to not smoke.    Some patients may value a numeric estimation of lung cancer risk when evaluating if lung cancer screening is right for them, here is one calculator:    ShouldIScreen

## 2024-09-30 NOTE — PATIENT INSTRUCTIONS
Patient Education   Body Mass Index: Care Instructions  Overview     Body mass index (BMI) can help you see if your weight is raising your risk for health problems. It uses a formula to compare how much you weigh with how tall you are.  A BMI lower than 18.5 is considered underweight.  A BMI between 18.5 and 24.9 is considered healthy.  A BMI between 25 and 29.9 is considered overweight. A BMI of 30 or higher is considered obese.  If your BMI is in the normal range, it means that you have a lower risk for weight-related health problems. If your BMI is in the overweight or obese range, you may be at increased risk for weight-related health problems, such as high blood pressure, heart disease, stroke, arthritis or joint pain, and diabetes. If your BMI is in the underweight range, you may be at increased risk for health problems such as fatigue, lower protection (immunity) against illness, muscle loss, bone loss, hair loss, and hormone problems.  BMI is just one measure of your risk for weight-related health problems. You may be at higher risk for health problems if you are not active, you eat an unhealthy diet, or you drink too much alcohol or use tobacco products.  Follow-up care is a key part of your treatment and safety. Be sure to make and go to all appointments, and call your doctor if you are having problems. It's also a good idea to know your test results and keep a list of the medicines you take.  How can you care for yourself at home?  Practice healthy eating habits. This includes eating plenty of fruits, vegetables, whole grains, lean protein, and low-fat dairy.  If your doctor recommends it, get more exercise. Walking is a good choice. Bit by bit, increase the amount you walk every day. Try for at least 30 minutes on most days of the week.  Do not smoke. Smoking can increase your risk for health problems. If you need help quitting, talk to your doctor about stop-smoking programs and medicines. These can  "increase your chances of quitting for good.  Limit alcohol to 2 drinks a day for men and 1 drink a day for women. Too much alcohol can cause health problems.  If you have a BMI higher than 25  Your doctor may do other tests to check your risk for weight-related health problems. This may include measuring the distance around your waist. A waist measurement of more than 40 inches in men or 35 inches in women can increase the risk of weight-related health problems.  Talk with your doctor about steps you can take to stay healthy or improve your health. You may need to make lifestyle changes to lose weight and stay healthy, such as changing your diet and getting regular exercise.  If you have a BMI lower than 18.5  Your doctor may do other tests to check your risk for health problems.  Talk with your doctor about steps you can take to stay healthy or improve your health. You may need to make lifestyle changes to gain or maintain weight and stay healthy, such as getting more healthy foods in your diet and doing exercises to build muscle.  Where can you learn more?  Go to https://www.[employer].net/patiented  Enter S176 in the search box to learn more about \"Body Mass Index: Care Instructions.\"  Current as of: May 13, 2023  Content Version: 14.2 2024 [facility].   Care instructions adapted under license by your healthcare professional. If you have questions about a medical condition or this instruction, always ask your healthcare professional. Healthwise, Incorporated disclaims any warranty or liability for your use of this information.    Preventive Care Advice   This is general advice given by our system to help you stay healthy. However, your care team may have specific advice just for you. Please talk to your care team about your preventive care needs.  Nutrition  Eat 5 or more servings of fruits and vegetables each day.  Try wheat bread, brown rice and whole grain pasta (instead of white bread, rice, " and pasta).  Get enough calcium and vitamin D. Check the label on foods and aim for 100% of the RDA (recommended daily allowance).  Lifestyle  Exercise at least 150 minutes each week  (30 minutes a day, 5 days a week).  Do muscle strengthening activities 2 days a week. These help control your weight and prevent disease.  No smoking.  Wear sunscreen to prevent skin cancer.  Have a dental exam and cleaning every 6 months.  Yearly exams  See your health care team every year to talk about:  Any changes in your health.  Any medicines your care team has prescribed.  Preventive care, family planning, and ways to prevent chronic diseases.  Shots (vaccines)   HPV shots (up to age 26), if you've never had them before.  Hepatitis B shots (up to age 59), if you've never had them before.  COVID-19 shot: Get this shot when it's due.  Flu shot: Get a flu shot every year.  Tetanus shot: Get a tetanus shot every 10 years.  Pneumococcal, hepatitis A, and RSV shots: Ask your care team if you need these based on your risk.  Shingles shot (for age 50 and up)  General health tests  Diabetes screening:  Starting at age 35, Get screened for diabetes at least every 3 years.  If you are younger than age 35, ask your care team if you should be screened for diabetes.  Cholesterol test: At age 39, start having a cholesterol test every 5 years, or more often if advised.  Bone density scan (DEXA): At age 50, ask your care team if you should have this scan for osteoporosis (brittle bones).  Hepatitis C: Get tested at least once in your life.  STIs (sexually transmitted infections)  Before age 24: Ask your care team if you should be screened for STIs.  After age 24: Get screened for STIs if you're at risk. You are at risk for STIs (including HIV) if:  You are sexually active with more than one person.  You don't use condoms every time.  You or a partner was diagnosed with a sexually transmitted infection.  If you are at risk for HIV, ask about PrEP  medicine to prevent HIV.  Get tested for HIV at least once in your life, whether you are at risk for HIV or not.  Cancer screening tests  Cervical cancer screening: If you have a cervix, begin getting regular cervical cancer screening tests starting at age 21.  Breast cancer scan (mammogram): If you've ever had breasts, begin having regular mammograms starting at age 40. This is a scan to check for breast cancer.  Colon cancer screening: It is important to start screening for colon cancer at age 45.  Have a colonoscopy test every 10 years (or more often if you're at risk) Or, ask your provider about stool tests like a FIT test every year or Cologuard test every 3 years.  To learn more about your testing options, visit:   .  For help making a decision, visit:   https://bit.ly/vz75029.  Prostate cancer screening test: If you have a prostate, ask your care team if a prostate cancer screening test (PSA) at age 55 is right for you.  Lung cancer screening: If you are a current or former smoker ages 50 to 80, ask your care team if ongoing lung cancer screenings are right for you.  For informational purposes only. Not to replace the advice of your health care provider. Copyright   2023 [employer]. All rights reserved. Clinically reviewed by the  [employer] [facility]. [employer] 793766 - REV 01/24.  Nutrition for Older Adults: Care Instructions  Overview     Good nutrition is important at any age. But it is especially important for older adults. Eating healthy foods helps keep your body strong. And it can help lower your risk for disease.  As you get older, your body needs more of certain nutrients. These include vitamin B12, calcium, and vitamin D. But it may be harder for you to get these and other important nutrients. This could be for many reasons. You may not feel as hungry as you used to. Or you could have problems with your teeth or mouth that make it hard to chew. Or you may not enjoy  planning and preparing meals, especially if you live alone.  Talk with your doctor if you want help getting the most nutrition from what you eat. They may have you work with a dietitian to help you plan meals.  Follow-up care is a key part of your treatment and safety. Be sure to make and go to all appointments, and call your doctor if you are having problems. It's also a good idea to know your test results and keep a list of the medicines you take.  How can you care for yourself at home?  To stay healthy  Eat a variety of foods. The more you vary the foods you eat, the more vitamins, minerals, and other nutrients you get.  Ask your doctor if you should take a multivitamin. Choose one with about 100% of the daily value (DV) for vitamins and minerals. Do not take more than 100% of the daily value for any vitamin or mineral unless your doctor tells you to. Talk with your doctor if you are not sure which multivitamin is right for you.  Try to eat lots of fruits and vegetables. Fresh or frozen vegetables and fruits are healthy choices. Choose canned vegetables that have no salt added and fruits that are canned in their own juice or light syrup.  Include foods that are high in vitamin B12 in your diet. Good choices are fortified breakfast cereal, nonfat or low-fat milk and other dairy products, meat, poultry, fish, and eggs.  Get enough calcium and vitamin D. Good choices include nonfat or low-fat milk, cheese, and yogurt. Other good options are tofu, orange juice with added calcium, and some leafy green vegetables, such as paco greens and kale. If you don't use milk products, talk to your doctor about calcium and vitamin D supplements.  Try to eat protein foods every day. Good choices include lean meat, fish, poultry, eggs, and cheese. Other good options are cooked beans, peanut butter, and nuts and seeds.  Choose whole grains for half of the grains you eat. Look for 100% whole wheat bread, whole-grain cereals, brown  rice, and other whole grains.  If you have constipation  Eat high-fiber foods every day if you can. These include fruits, vegetables, cooked dried beans, and whole grains.  Drink plenty of fluids. If you have kidney, heart, or liver disease and have to limit fluids, talk with your doctor before you increase the amount of fluids you drink.  Ask your doctor if stool softeners may help keep your bowels regular.  If you have mouth problems that make chewing hard  Pick canned or cooked fruits and vegetables. These are often softer.  Chop or shred meat, poultry, and fish. Add sauce or gravy to the meat to help keep it moist.  Pick other protein foods that are soft. These include cheese, peanut butter, cooked beans, cottage cheese, and eggs.  If you have trouble shopping for yourself  Ask a local food store to deliver groceries to your home.  Contact your local area agency on aging and ask about resources that can help.  Ask a family member or neighbor to help you.  If you have trouble preparing meals  Try easier cooking methods such as using a slow cooker or microwave oven.  Let the grocery store do some of the work for you. Look for precut, washed, and ready-to-eat foods.  Take part in group meal programs. You can find these through senior citizen programs.  Have meals brought to your home. Your community may offer programs that deliver meals, such as Meals on Wheels. Or you could use an online meal delivery service.  If you are able, take a cooking class.  If your appetite is poor  Try to eat meals on a regular schedule. It may help to eat smaller meals more often throughout the day.  If you can, eat some meals with other people. You could ask family or friends to eat with you. Or you could take part in group meal programs offered in your community.  Ask your doctor if your medicines could cause appetite or taste problems. If so, ask about changing medicines.  Add spices and herbs to increase the flavor of food.  If you  "think you are depressed, ask your doctor for help. Depression can affect your appetite. And it can make it hard to do everyday activities like grocery shopping and making meals. Treatment can help.  When should you call for help?  Watch closely for changes in your health, and be sure to contact your doctor if you have any problems.  Where can you learn more?  Go to https://www.[employer].net/patiented  Enter L643 in the search box to learn more about \"Nutrition for Older Adults: Care Instructions.\"  Current as of: September 25, 2023  Content Version: 14.2 2024 [employer] website.   Care instructions adapted under license by your healthcare professional. If you have questions about a medical condition or this instruction, always ask your healthcare professional. Healthwise, Incorporated disclaims any warranty or liability for your use of this information.    Hearing Loss: Care Instructions  Overview     Hearing loss is a sudden or slow decrease in how well you hear. It can range from slight to profound. Permanent hearing loss can occur with aging. It also can happen when you are exposed long-term to loud noise. Examples include listening to loud music, riding motorcycles, or being around other loud machines.  Hearing loss can affect your work and home life. It can make you feel lonely or depressed. You may feel that you have lost your independence. But hearing aids and other devices can help you hear better and feel connected to others.  Follow-up care is a key part of your treatment and safety. Be sure to make and go to all appointments, and call your doctor if you are having problems. It's also a good idea to know your test results and keep a list of the medicines you take.  How can you care for yourself at home?  Avoid loud noises whenever possible. This helps keep your hearing from getting worse.  Always wear hearing protection around loud noises.  Wear a hearing aid as directed.  A professional can help " "you pick a hearing aid that will work best for you.  You can also get hearing aids over the counter for mild to moderate hearing loss.  Have hearing tests as your doctor suggests. They can show whether your hearing has changed. Your hearing aid may need to be adjusted.  Use other devices as needed. These may include:  Telephone amplifiers and hearing aids that can connect to a television, stereo, radio, or microphone.  Devices that use lights or vibrations. These alert you to the doorbell, a ringing telephone, or a baby monitor.  Television closed-captioning. This shows the words at the bottom of the screen. Most new TVs can do this.  TTY (text telephone). This lets you type messages back and forth on the telephone instead of talking or listening. These devices are also called TDD. When messages are typed on the keyboard, they are sent over the phone line to a receiving TTY. The message is shown on a monitor.  Use text messaging, social media, and email if it is hard for you to communicate by telephone.  Try to learn a listening technique called speechreading. It is not lipreading. You pay attention to people's gestures, expressions, posture, and tone of voice. These clues can help you understand what a person is saying. Face the person you are talking to, and have them face you. Make sure the lighting is good. You need to see the other person's face clearly.  Think about counseling if you need help to adjust to your hearing loss.  When should you call for help?  Watch closely for changes in your health, and be sure to contact your doctor if:    You think your hearing is getting worse.     You have new symptoms, such as dizziness or nausea.   Where can you learn more?  Go to https://www.[employer]net/patiented  Enter R798 in the search box to learn more about \"Hearing Loss: Care Instructions.\"  Current as of: September 27, 2023               Content Version: 14.0    6160-4728 [employer] Inc[employer].   Care " instructions adapted under license by your healthcare professional. If you have questions about a medical condition or this instruction, always ask your healthcare professional. Healthwise, [facility] disclaims any warranty or liability for your use of this information.      Learning About Sleeping Well  What does sleeping well mean?     Sleeping well means getting enough sleep to feel good and stay healthy. How much sleep is enough varies among people.  The number of hours you sleep and how you feel when you wake up are both important. If you do not feel refreshed, you probably need more sleep. Another sign of not getting enough sleep is feeling tired during the day.  Experts recommend that adults get at least 7 or more hours of sleep per day. Children and older adults need more sleep.  Why is getting enough sleep important?  Getting enough quality sleep is a basic part of good health. When your sleep suffers, your physical health, mood, and your thoughts can suffer too. You may find yourself feeling more grumpy or stressed. Not getting enough sleep also can lead to serious problems, including injury, accidents, anxiety, and depression.  What might cause poor sleeping?  Many things can cause sleep problems, including:  Changes to your sleep schedule.  Stress. Stress can be caused by fear about a single event, such as giving a speech. Or you may have ongoing stress, such as worry about work or school.  Depression, anxiety, and other mental or emotional conditions.  Changes in your sleep habits or surroundings. This includes changes that happen where you sleep, such as noise, light, or sleeping in a different bed. It also includes changes in your sleep pattern, such as having jet lag or working a late shift.  Health problems, such as pain, breathing problems, and restless legs syndrome.  Lack of regular exercise.  Using alcohol, nicotine, or caffeine before bed.  How can you help yourself?  Here are some tips that  "may help you sleep more soundly and wake up feeling more refreshed.  Your sleeping area   Use your bedroom only for sleeping and sex. A bit of light reading may help you fall asleep. But if it doesn't, do your reading elsewhere in the house. Try not to use your TV, computer, smartphone, or tablet while you are in bed.  Be sure your bed is big enough to stretch out comfortably, especially if you have a sleep partner.  Keep your bedroom quiet, dark, and cool. Use curtains, blinds, or a sleep mask to block out light. To block out noise, use earplugs, soothing music, or a \"white noise\" machine.  Your evening and bedtime routine   Create a relaxing bedtime routine. You might want to take a warm shower or bath, or listen to soothing music.  Go to bed at the same time every night. And get up at the same time every morning, even if you feel tired.  What to avoid   Limit caffeine (coffee, tea, caffeinated sodas) during the day, and don't have any for at least 6 hours before bedtime.  Avoid drinking alcohol before bedtime. Alcohol can cause you to wake up more often during the night.  Try not to smoke or use tobacco, especially in the evening. Nicotine can keep you awake.  Limit naps during the day, especially close to bedtime.  Avoid lying in bed awake for too long. If you can't fall asleep or if you wake up in the middle of the night and can't get back to sleep within about 20 minutes, get out of bed and go to another room until you feel sleepy.  Avoid taking medicine right before bed that may keep you awake or make you feel hyper or energized. Your doctor can tell you if your medicine may do this and if you can take it earlier in the day.  If you can't sleep   Imagine yourself in a peaceful, pleasant scene. Focus on the details and feelings of being in a place that is relaxing.  Get up and do a quiet or boring activity until you feel sleepy.  Avoid drinking any liquids before going to bed to help prevent waking up often to " "use the bathroom.  Where can you learn more?  Go to https://www.[employer].net/patiented  Enter J942 in the search box to learn more about \"Learning About Sleeping Well.\"  Current as of: July 10, 2023  Content Version: 14.1    0529-6822 [employer].   Care instructions adapted under license by your healthcare professional. If you have questions about a medical condition or this instruction, always ask your healthcare professional. [employer] disclaims any warranty or liability for your use of this information.    Learning About Depression Screening  What is depression screening?  Depression screening is a way to see if you have depression symptoms. It may be done by a doctor or counselor. It's often part of a routine checkup. That's because your mental health is just as important as your physical health.  Depression is a mental health condition that affects how you feel, think, and act. You may:  Have less energy.  Lose interest in your daily activities.  Feel sad and grouchy for a long time.  Depression is very common. It affects people of all ages.  Many things can lead to depression. Some people become depressed after they have a stroke or find out they have a major illness like cancer or heart disease. The death of a loved one or a breakup may lead to depression. It can run in families. Most experts believe that a combination of inherited genes and stressful life events can cause it.  What happens during screening?  You may be asked to fill out a form about your depression symptoms. You and the doctor will discuss your answers. The doctor may ask you more questions to learn more about how you think, act, and feel.  What happens after screening?  If you have symptoms of depression, your doctor will talk to you about your options.  Doctors usually treat depression with medicines or counseling. Often, combining the two works best. Many people don't get help because they think that they'll " "get over the depression on their own. But people with depression may not get better unless they get treatment.  The cause of depression is not well understood. There may be many factors involved. But if you have depression, it's not your fault.  A serious symptom of depression is thinking about death or suicide. If you or someone you care about talks about this or about feeling hopeless, get help right away.  It's important to know that depression can be treated. Medicine, counseling, and self-care may help.  Where can you learn more?  Go to https://www.[employer].net/patiented  Enter T185 in the search box to learn more about \"Learning About Depression Screening.\"  Current as of: June 24, 2023  Content Version: 14.1 2006-2024 [employer].   Care instructions adapted under license by your healthcare professional. If you have questions about a medical condition or this instruction, always ask your healthcare professional. [employer] disclaims any warranty or liability for your use of this information.    Substance Use Disorder: Care Instructions  Overview     You can improve your life and health by stopping your use of alcohol or drugs. When you don't drink or use drugs, you may feel and sleep better. You may get along better with your family, friends, and coworkers. There are medicines and programs that can help with substance use disorder.  How can you care for yourself at home?  Here are some ways to help you stay sober and prevent relapse.  If you have been given medicine to help keep you sober or reduce your cravings, be sure to take it exactly as prescribed.  Talk to your doctor about programs that can help you stop using drugs or drinking alcohol.  Do not keep alcohol or drugs in your home.  Plan ahead. Think about what you'll say if other people ask you to drink or use drugs. Try not to spend time with people who drink or use drugs.  Use the time and money spent on drinking or " drugs to do something that's important to you.  Preventing a relapse  Have a plan to deal with relapse. Learn to recognize changes in your thinking that lead you to drink or use drugs. Get help before you start to drink or use drugs again.  Try to stay away from situations, friends, or places that may lead you to drink or use drugs.  If you feel the need to drink alcohol or use drugs again, seek help right away. Call a trusted friend or family member. Some people get support from organizations such as Narcotics Anonymous or [employer] or from treatment facilities.  If you relapse, get help as soon as you can. Some people make a plan with another person that outlines what they want that person to do for them if they relapse. The plan usually includes how to handle the relapse and who to notify in case of relapse.  Don't give up. Remember that a relapse doesn't mean that you have failed. Use the experience to learn the triggers that lead you to drink or use drugs. Then quit again. Recovery is a lifelong process. Many people have several relapses before they are able to quit for good.  Follow-up care is a key part of your treatment and safety. Be sure to make and go to all appointments, and call your doctor if you are having problems. It's also a good idea to know your test results and keep a list of the medicines you take.  When should you call for help?   Call 911  anytime you think you may need emergency care. For example, call if you or someone else:    Has overdosed or has withdrawal signs. Be sure to tell the emergency workers that you are or someone else is using or trying to quit using drugs. Overdose or withdrawal signs may include:  Losing consciousness.  Seizure.  Seeing or hearing things that aren't there (hallucinations).     Is thinking or talking about suicide or harming others.   Where to get help 24 hours a day, 7 days a week   If you or someone you know talks about suicide, self-harm, a mental  "health crisis, a substance use crisis, or any other kind of emotional distress, get help right away. You can:    Call the Suicide and Crisis Lifeline at 988.     Call 0-575-525-TALK (1-326.877.6255).     Text HOME to 491988 to access the Crisis Text Line.   Consider saving these numbers in your phone.  Go to [employer] for more information or to chat online.  Call your doctor now or seek immediate medical care if:    You are having withdrawal symptoms. These may include nausea or vomiting, sweating, shakiness, and anxiety.   Watch closely for changes in your health, and be sure to contact your doctor if:    You have a relapse.     You need more help or support to stop.   Where can you learn more?  Go to https://www.[employer].net/patiented  Enter H573 in the search box to learn more about \"Substance Use Disorder: Care Instructions.\"  Current as of: November 15, 2023  Content Version: 14.2 2024 [employer]   Care instructions adapted under license by your healthcare professional. If you have questions about a medical condition or this instruction, always ask your healthcare professional. [employer] disclaims any warranty or liability for your use of this information.       Lung Cancer Screening   Frequently Asked Questions  If you are at high-risk for lung cancer, getting screened with low-dose computed tomography (LDCT) every year can help save your life. This handout offers answers to some of the most common questions about lung cancer screening. If you have other questions, please call 5-883-0[facility] ([phone]).     What is it?  Lung cancer screening uses special X-ray technology to create an image of your lung tissue. The exam is quick and easy and takes less than 10 seconds. We don t give you any medicine or use any needles. You can eat before and after the exam. You don t need to change your clothes as long as the clothing on your chest doesn t contain metal. But, you " do need to be able to hold your breath for at least 6 seconds during the exam.    What is the goal of lung cancer screening?  The goal of lung cancer screening is to save lives. Many times, lung cancer is not found until a person starts having physical symptoms. Lung cancer screening can help detect lung cancer in the earliest stages when it may be easier to treat.    Who should be screened for lung cancer?  We suggest lung cancer screening for anyone who is at high-risk for lung cancer. You are in the high-risk group if you:      are between the ages of 55 and 79, and    have smoked at least 1 pack of cigarettes a day for 20 or more years, and    still smoke or have quit within the past 15 years.    However, if you have a new cough or shortness of breath, you should talk to your doctor before being screened.    Why does it matter if I have symptoms?  Certain symptoms can be a sign that you have a condition in your lungs that should be checked and treated by your doctor. These symptoms include fever, chest pain, a new or changing cough, shortness of breath that you have never felt before, coughing up blood or unexplained weight loss. Having any of these symptoms can greatly affect the results of lung cancer screening.       Should all smokers get an LDCT lung cancer screening exam?  It depends. Lung cancer screening is for a very specific group of men and women who have a history of heavy smoking over a long period of time (see  Who should be screened for lung cancer  above).  I am in the high-risk group, but have been diagnosed with cancer in the past. Is LDCT lung cancer screening right for me?  In some cases, you should not have LDCT lung screening, such as when your doctor is already following your cancer with CT scan studies. Your doctor will help you decide if LDCT lung screening is right for you.  Do I need to have a screening exam every year?  Yes. If you are in the high-risk group described earlier, you  should get an LDCT lung cancer screening exam every year until you are 79, or are no longer willing or able to undergo screening and possible procedures to diagnose and treat lung cancer.  How effective is LDCT at preventing death from lung cancer?  Studies have shown that LDCT lung cancer screening can lower the risk of death from lung cancer by 20 percent in people who are at high-risk.  What are the risks?  There are some risks and limitations of LDCT lung cancer screening. We want to make sure you understand the risks and benefits, so please let us know if you have any questions. Your doctor may want to talk with you more about these risks.    Radiation exposure: As with any exam that uses radiation, there is a very small increased risk of cancer. The amount of radiation in LDCT is small--about the same amount a person would get from a mammogram. Your doctor orders the exam when he or she feels the potential benefits outweigh the risks.    False negatives: No test is perfect, including LDCT. It is possible that you may have a medical condition, including lung cancer, that is not found during your exam. This is called a false negative result.    False positives and more testing: LDCT very often finds something in the lung that could be cancer, but in fact is not. This is called a false positive result. False positive tests often cause anxiety. To make sure these findings are not cancer, you may need to have more tests. These tests will be done only if you give us permission. Sometimes patients need a treatment that can have side effects, such as a biopsy. For more information on false positives, see  What can I expect from the results?     Findings not related to lung cancer: Your LDCT exam also takes pictures of areas of your body next to your lungs. In a very small number of cases, the CT scan will show an abnormal finding in one of these areas, such as your kidneys, adrenal glands, liver or thyroid. This  finding may not be serious, but you may need more tests. Your doctor can help you decide what other tests you may need, if any.  What can I expect from the results?  About 1 out of 4 LDCT exams will find something that may need more tests. Most of the time, these findings are lung nodules. Lung nodules are very small collections of tissue in the lung. These nodules are very common, and the vast majority--more than 97 percent--are not cancer (benign). Most are normal lymph nodes or small areas of scarring from past infections.  But, if a small lung nodule is found to be cancer, the cancer can be cured more than 90 percent of the time. To know if the nodule is cancer, we may need to get more images before your next yearly screening exam. If the nodule has suspicious features (for example, it is large, has an odd shape or grows over time), we will refer you to a specialist for further testing.  Will my doctor also get the results?  Yes. Your doctor will get a copy of your results.  Is it okay to keep smoking now that there s a cancer screening exam?  No. Tobacco is one of the strongest cancer-causing agents. It causes not only lung cancer, but other cancers and cardiovascular (heart) diseases as well. The damage caused by smoking builds over time. This means that the longer you smoke, the higher your risk of disease. While it is never too late to quit, the sooner you quit, the better.  Where can I find help to quit smoking?  The best way to prevent lung cancer is to stop smoking. If you have already quit smoking, congratulations and keep it up! For help on quitting smoking, please call [employer] at 0-707-QUITNOW (1-255.255.7912) or the American Cancer Society at 1-810.327.8145 to find local resources near you.  One-on-one health coaching:  If you d prefer to work individually with a health care provider on tobacco cessation, we offer:      Medication Therapy Management:  Our specially trained pharmacists work  closely with you and your doctor to help you quit smoking.  Call [phone] or [phone] (toll free).

## 2024-09-30 NOTE — ASSESSMENT & PLAN NOTE
PROSOLV - 11/08/2023 7:57 AM CST   Summary   1. Sinus bradycardia during study.   2. Normal LV size with normal wall thickness. Calculated 3D LVEF 56%. No  regional wall motion abnormalities. (Normal function). Grade 1 diastolic  dysfunction.   3. Normal RV size and systolic function. Estimated PASP 31 mmHg  + RA  Pressure.   4. Mild LA enlargement.   5. Sclerotic aortic valve, no stenosis.   6. Mild aortic valve regurgitation.   7. Mild mitral valve regurgitation.   8. There is mild tricuspid valve regurgitation.   9. A prior study is not available for comparison.

## 2024-09-30 NOTE — PROGRESS NOTES
Preventive Care Visit  [facility]  Mark Barrera MD, Internal Medicine  Sep 30, 2024      Assessment & Plan   Problem List Items Addressed This Visit       Moderate COPD (chronic obstructive pulmonary disease) (H)     2014 FEV1 FVC 55%, FEV1 1.32 L or 56% of predicted  Notes cough and dyspnea particularly in the mornings but declines additional therapy  Walks her dog 3 times a day           Relevant Medications    albuterol (PROAIR HFA/PROVENTIL HFA/VENTOLIN HFA) 108 (90 Base) MCG/ACT inhaler    fluticasone (FLONASE) 50 MCG/ACT nasal spray    Other Relevant Orders    OFFICE/OUTPT VISIT,ESTLEVL IV (Completed)    Chronic rhinitis    Relevant Medications    albuterol (PROAIR HFA/PROVENTIL HFA/VENTOLIN HFA) 108 (90 Base) MCG/ACT inhaler    fluticasone (FLONASE) 50 MCG/ACT nasal spray    Other Relevant Orders    OFFICE/OUTPT VISIT,ESTLEVL IV (Completed)    Mixed hyperlipidemia    Relevant Medications    simvastatin (ZOCOR) 40 MG tablet    Other Relevant Orders    Lipid panel reflex to direct LDL Non-fasting    OFFICE/OUTPT VISIT,EST,LEVL IV (Completed)    Impaired glucose tolerance    Relevant Orders    OFFICE/OUTPT VISIT,ESTLEVL IV (Completed)    Mild major depression (H)     Controlled with fluoxetine 10 mg daily         Relevant Medications    FLUoxetine (PROZAC) 10 MG capsule    Other Relevant Orders    OFFICE/OUTPT VISIT,EST,LEVL IV (Completed)    Class 1 obesity due to excess calories with serious comorbidity and body mass index (BMI) of 30.0 to 30.9 in adult     Reviewed the importance of weight management and offered referral         Relevant Orders    OFFICE/OUTPT VISIT,EST,LEVL IV (Completed)    Primary hypertension     Controlled with triamterene 37.5/hydrochlorothiazide 25         Relevant Medications    triamterene-HCTZ (MAXZIDE-25) 37.5-25 MG tablet    Other Relevant Orders    BASIC METABOLIC PANEL    OFFICE/OUTPT VISIT,EST,LEVL IV (Completed)    Nonrheumatic aortic valve  "insufficiency     PROSOLV - 11/08/2023 7:57 AM CST   Summary   1. Sinus bradycardia during study.   2. Normal LV size with normal wall thickness. Calculated 3D LVEF 56%. No  regional wall motion abnormalities. (Normal function). Grade 1 diastolic  dysfunction.   3. Normal RV size and systolic function. Estimated PASP 31 mmHg  + RA  Pressure.   4. Mild LA enlargement.   5. Sclerotic aortic valve, no stenosis.   6. Mild aortic valve regurgitation.   7. Mild mitral valve regurgitation.   8. There is mild tricuspid valve regurgitation.   9. A prior study is not available for comparison.          Other Visit Diagnoses       Encounter for Medicare annual wellness exam    -  Primary    Visit for screening mammogram        Relevant Orders    MA Screening Bilateral w/ Agustin    Screen for colon cancer        Mixed hearing loss, bilateral        Relevant Orders    Adult Audiology  Referral    Personal history of tobacco use        Relevant Orders    Prof fee: Shared Decision Making for Lung Cancer Screening (Completed)    CT Chest Lung Cancer Scrn Low Dose wo                    BMI  Estimated body mass index is 30.79 kg/m  as calculated from the following:    Height as of this encounter: 1.619 m (5' 3.75\").    Weight as of this encounter: 80.7 kg (178 lb).   Weight management plan: Discussed healthy diet and exercise guidelines    Counseling  Appropriate preventive services were addressed with this patient via screening, questionnaire, or discussion as appropriate for fall prevention, nutrition, physical activity, Tobacco-use cessation, social engagement, weight loss and cognition.  Checklist reviewing preventive services available has been given to the patient.  Reviewed patient's diet, addressing concerns and/or questions.   She is at risk for psychosocial distress and has been provided with information to reduce risk.   Discussed possible causes of fatigue. Addressed any concerns about safety while driving.  The " patient was provided with written information regarding signs of hearing loss.   The patient's PHQ-9 score is consistent with mild depression. She was provided with information regarding depression.     Follow-up for wellness visit in 1 year      Rogelio Santiago is a 69 year old, presenting for the following:  Wellness Visit (Pt here for an AWV and fasting labwork and Follow-up on depression and HTN), Depression, and Hypertension         No data to display                  Health Care Directive  Patient has a Health Care Directive on file  Discussed advance care planning with patient.      HEARING FREQUENCY    Right Ear:      1000 Hz RESPONSE- on Level: tone not heard (Conditioning sound)   1000 Hz: RESPONSE- on Level: tone not heard   2000 Hz: RESPONSE- on Level: tone not heard   4000 Hz: RESPONSE- on Level: tone not heard    Left Ear:      4000 Hz: RESPONSE- on Level: tone not heard   2000 Hz: RESPONSE- on Level: 40 db   1000 Hz: RESPONSE- on Level: 40 db        Hearing Acuity: REFER    Hearing Assessment: abnormal--refer to audiology           HPI  Patient has generally been well.  She is walking her dog 3 times per day.  Does not get much other exercise.  Had her first lung cancer screening CT a year ago.  Reviewed the pros and cons of repeating this.  She generally feels like she is doing well.    Depression   How are you doing with your depression since your last visit? Worsened   Are you having other symptoms that might be associated with depression? No  Have you had a significant life event?  Relationship Concerns   Are you feeling anxious or having panic attacks?   No  Do you have any concerns with your use of alcohol or other drugs? No    Social History     Tobacco Use    Smoking status: Former     Current packs/day: 0.00     Average packs/day: 1 pack/day for 40.0 years (40.0 ttl pk-yrs)     Types: Cigarettes     Start date:      Quit date: 2013     Years since quittin.7    Smokeless tobacco:  Never   Vaping Use    Vaping status: Never Used   Substance Use Topics    Alcohol use: Not Currently    Drug use: Never         3/14/2023     1:19 PM 9/25/2023    11:02 AM 9/30/2024     8:34 AM   PHQ   PHQ-9 Total Score 8 2 5   Q9: Thoughts of better off dead/self-harm past 2 weeks Not at all Not at all Not at all          No data to display                  9/30/2024     8:34 AM   Last PHQ-9   1.  Little interest or pleasure in doing things 0   2.  Feeling down, depressed, or hopeless 0   3.  Trouble falling or staying asleep, or sleeping too much 2   4.  Feeling tired or having little energy 3   5.  Poor appetite or overeating 0   6.  Feeling bad about yourself 0   7.  Trouble concentrating 0   8.  Moving slowly or restless 0   Q9: Thoughts of better off dead/self-harm past 2 weeks 0   PHQ-9 Total Score 5       Suicide Assessment Five-step Evaluation and Treatment (SAFE-T)          9/30/2024   General Health   How would you rate your overall physical health? Good   Feel stress (tense, anxious, or unable to sleep) Only a little      (!) STRESS CONCERN      9/30/2024   Nutrition   Diet: Regular (no restrictions)            9/30/2024   Exercise   Days per week of moderate/strenous exercise 7 days            9/30/2024   Social Factors   Frequency of gathering with friends or relatives Twice a week   Worry food won't last until get money to buy more No   Food not last or not have enough money for food? No   Do you have housing? (Housing is defined as stable permanent housing and does not include staying ouside in a car, in a tent, in an abandoned building, in an overnight shelter, or couch-surfing.) Yes   Are you worried about losing your housing? No   Lack of transportation? No   Unable to get utilities (heat,electricity)? No            9/30/2024   Fall Risk   Fallen 2 or more times in the past year? No    No   Trouble with walking or balance? No    No       Multiple values from one day are sorted in  reverse-chronological order          2024   Activities of Daily Living- Home Safety   Needs help with the following daily activites None of the above   Safety concerns in the home None of the above            2024   Dental   Dentist two times every year? Yes            2024   Hearing Screening   Hearing concerns? (!) I NEED TO ASK PEOPLE TO SPEAK UP OR REPEAT THEMSELVES.    (!) TROUBLE UNDERSTANDING SOFT OR WHISPERED SPEECH.       Multiple values from one day are sorted in reverse-chronological order         2024   Driving Risk Screening   Patient/family members have concerns about driving (!) YES does not like to drive far from home but has no safety issues.            2024   General Alertness/Fatigue Screening   Have you been more tired than usual lately? (!) YES            2024   Urinary Incontinence Screening   Bothered by leaking urine in past 6 months No            2024   TB Screening   Were you born outside of the [place]? No          Today's PHQ-9 Score:       2024     8:34 AM   PHQ-9 SCORE   PHQ-9 Total Score MyChart 5 (Mild depression)   PHQ-9 Total Score 5         2024   Substance Use   Alcohol more than 3/day or more than 7/wk No   Do you have a current opioid prescription? No   How severe/bad is pain from 1 to 10? 2/10   Do you use any other substances recreationally? (!) OTHER        Social History     Tobacco Use    Smoking status: Former     Current packs/day: 0.00     Average packs/day: 1 pack/day for 40.0 years (40.0 ttl pk-yrs)     Types: Cigarettes     Start date:      Quit date:      Years since quittin.7    Smokeless tobacco: Never   Vaping Use    Vaping status: Never Used   Substance Use Topics    Alcohol use: Not Currently    Drug use: Never              ASCVD Risk   The 10-year ASCVD risk score (Bam VILLALOBOS, et al., 2019) is: 14.4%    Values used to calculate the score:      Age: 69 years      Sex: Female      Is Non-  : No      Diabetic: No      Tobacco smoker: No      Systolic Blood Pressure: 162 mmHg      Is BP treated: No      HDL Cholesterol: 69 mg/dL      Total Cholesterol: 141 mg/dL            Reviewed and updated as needed this visit by Provider                    Past Medical History:   Diagnosis Date    Acute biliary pancreatitis 2018    Acute biliary pancreatitis 04/14/2018    Atypical squamous cells of undetermined significance (ASCUS) on Papanicolaou smear of cervix 04/25/2022    Cholelithiasis 04/14/2018    Chronic rhinitis     COPD (chronic obstructive pulmonary disease) (H)     Depression     Eczema     H/O left nephrolithotomy with removal of calculi 06/15/2022    Hyperlipidemia LDL goal <100     Hypertension     Kidney stone 04/2022    Papanicolaou smear of cervix with atypical squamous cells of undetermined significance (ASC-US)      Past Surgical History:   Procedure Laterality Date    APPENDECTOMY  04/05/1984    CHOLECYSTECTOMY  04/15/2018    ENDOSCOPIC RETROGRADE CHOLANGIOPANCREATOGRAPHY  04/16/2018    IR NEPHROLITHOTOMY  6/15/2022    IR RENAL ANGIOGRAM LEFT  6/17/2022    KIDNEY SURGERY      1972    PERCUTANEOUS NEPHROLITHOTOMY Left 6/15/2022    Procedure: NEPHROLITHOTOMY, PERCUTANEOUS AND STENT INSERTION WITH HOLIUM LASER LITHOTRIPSY;  Surgeon: Sergio Nowak MD;  [facility]     Current providers sharing in care for this patient include:  Patient Care Team:  Mark Barrera MD as PCP - General (Internal Medicine)  Mark Barrera MD as Assigned PCP    The following health maintenance items are reviewed in Epic and correct as of today:  Health Maintenance   Topic Date Due    DEPRESSION ACTION PLAN  Never done    MAMMO SCREENING  Never done    COLORECTAL CANCER SCREENING  Never done    BMP  03/14/2024    INFLUENZA VACCINE (1) 09/01/2024    COVID-19 Vaccine (8 - 2024-25 season) 09/12/2024    LIPID  09/25/2024    ANNUAL REVIEW OF HM ORDERS  09/25/2024    MEDICARE ANNUAL  "WELLNESS VISIT  09/25/2024    LUNG CANCER SCREENING  10/05/2024    PHQ-9  03/30/2025    FALL RISK ASSESSMENT  09/30/2025    GLUCOSE  03/14/2026    ADVANCE CARE PLANNING  09/25/2028    DTAP/TDAP/TD IMMUNIZATION (3 - Td or Tdap) 10/01/2028    DEXA  07/16/2035    SPIROMETRY  Completed    HEPATITIS C SCREENING  Completed    COPD ACTION PLAN  Completed    Pneumococcal Vaccine: 65+ Years  Completed    ZOSTER IMMUNIZATION  Completed    RSV VACCINE  Completed    HPV IMMUNIZATION  Aged Out    MENINGITIS IMMUNIZATION  Aged Out    RSV MONOCLONAL ANTIBODY  Aged Out         Review of Systems  Constitutional, HEENT, cardiovascular, pulmonary, GI, , musculoskeletal, neuro, skin, endocrine and psych systems are negative, except as otherwise noted.     Objective    Exam  BP (!) 162/93 (BP Location: Right arm, Patient Position: Sitting, Cuff Size: Adult Regular)   Pulse 64   Temp 97.1  F (36.2  C) (Tympanic)   Resp 20   Ht 1.619 m (5' 3.75\")   Wt 80.7 kg (178 lb)   LMP  (LMP Unknown)   SpO2 95%   BMI 30.79 kg/m     Estimated body mass index is 30.79 kg/m  as calculated from the following:    Height as of this encounter: 1.619 m (5' 3.75\").    Weight as of this encounter: 80.7 kg (178 lb).    Physical Exam  Obese woman in no distress  Eyes normal  HEENT exam unremarkable  Neck supple adenopathy or thyromegaly  Lungs clear  Cardiac exam reveals a soft systolic murmur in the aortic area and into the apex, no edema, normal carotid and dorsalis pedis pulsations  Abdomen obese, soft, nondistended, nontender  Alert, oriented, speech and cognition intact, no facial asymmetry, EOMI, normal strength and gait  Normal mood and affect        9/30/2024   Mini Cog   Clock Draw Score 2 Normal   3 Item Recall 3 objects recalled   Mini Cog Total Score 5                 Signed Electronically by: Mark Barrera MD    Answers submitted by the patient for this visit:  Patient Health Questionnaire (Submitted on 9/30/2024)  If you checked off any " problems, how difficult have these problems made it for you to do your work, take care of things at home, or get along with other people?: Not difficult at all  PHQ9 TOTAL SCORE: 5

## 2024-09-30 NOTE — PROGRESS NOTES
Pt mammo and CT orders faxed to the [facility] for scheduling per pt request [phone].  Mirza Phelps CMA

## 2024-09-30 NOTE — LETTER
October 1, 2024      Pamela Shelton  [address] [facility] [address]        Dear ,    We are writing to inform you of your test results.    Glucose slightly high.  Recommend weight loss, a reduced carbohydrate, and regular physical activity, results otherwise normal.     Resulted Orders   BASIC METABOLIC PANEL   Result Value Ref Range    Sodium 143 135 - 145 mmol/L    Potassium 3.9 3.4 - 5.3 mmol/L    Chloride 105 98 - 107 mmol/L    Carbon Dioxide (CO2) 29 22 - 29 mmol/L    Anion Gap 9 7 - 15 mmol/L    Urea Nitrogen 14.4 8.0 - 23.0 mg/dL    Creatinine 0.84 0.51 - 0.95 mg/dL    GFR Estimate 74 >60 mL/min/1.73m2      Comment:      eGFR calculated using 2021 CKD-EPI equation.    Calcium 9.4 8.8 - 10.4 mg/dL      Comment:      Reference intervals for this test were updated on 7/16/2024 to reflect our healthy population more accurately. There may be differences in the flagging of prior results with similar values performed with this method. Those prior results can be interpreted in the context of the updated reference intervals.    Glucose 105 (H) 70 - 99 mg/dL    Patient Fasting > 8hrs? Yes    Lipid panel reflex to direct LDL Non-fasting   Result Value Ref Range    Cholesterol 136 <200 mg/dL    Triglycerides 46 <150 mg/dL    Direct Measure HDL 60 >=50 mg/dL    LDL Cholesterol Calculated 67 <100 mg/dL    Non HDL Cholesterol 76 <130 mg/dL    Patient Fasting > 8hrs? Yes     Narrative    Cholesterol  Desirable: < 200 mg/dL  Borderline High: 200 - 239 mg/dL  High: >= 240 mg/dL    Triglycerides  Normal: < 150 mg/dL  Borderline High: 150 - 199 mg/dL  High: 200-499 mg/dL  Very High: >= 500 mg/dL    Direct Measure HDL  Female: >= 50 mg/dL   Male: >= 40 mg/dL    LDL Cholesterol  Desirable: < 100 mg/dL  Above Desirable: 100 - 129 mg/dL   Borderline High: 130 - 159 mg/dL   High:  160 - 189 mg/dL   Very High: >= 190 mg/dL    Non HDL Cholesterol  Desirable: < 130 mg/dL  Above Desirable: 130 - 159 mg/dL  Borderline  High: 160 - 189 mg/dL  High: 190 - 219 mg/dL  Very High: >= 220 mg/dL       If you have any questions or concerns, please call the clinic at the number listed above.       Sincerely,      Mark Barrera MD

## 2024-10-22 DIAGNOSIS — N63.25 UNSPECIFIED LUMP IN THE LEFT BREAST, OVERLAPPING QUADRANTS: ICD-10-CM

## 2024-10-22 DIAGNOSIS — N63.0 BENIGN BREAST LUMPS: ICD-10-CM

## 2024-10-22 DIAGNOSIS — N63.20 MASS OF LEFT BREAST, UNSPECIFIED QUADRANT: Primary | ICD-10-CM

## 2024-10-22 NOTE — PROGRESS NOTES
"Patient had an abnormal mammogram with subsequent ultrasound-guided biopsy revealing \"1. benign breast tissue with nodular stromal fibrosis.  2. Negative for atypia and malignancy. \" I reviewed the findings with the patient as well as the recommendation for an ultrasound of the left breast in 6 months.  "

## 2025-03-06 ENCOUNTER — OFFICE VISIT (OUTPATIENT)
Dept: FAMILY MEDICINE | Facility: CLINIC | Age: 70
End: 2025-03-06
Payer: COMMERCIAL

## 2025-03-06 VITALS
HEART RATE: 64 BPM | HEIGHT: 64 IN | WEIGHT: 187 LBS | TEMPERATURE: 97.3 F | BODY MASS INDEX: 31.92 KG/M2 | DIASTOLIC BLOOD PRESSURE: 81 MMHG | RESPIRATION RATE: 20 BRPM | OXYGEN SATURATION: 96 % | SYSTOLIC BLOOD PRESSURE: 129 MMHG

## 2025-03-06 DIAGNOSIS — J44.9 MODERATE COPD (CHRONIC OBSTRUCTIVE PULMONARY DISEASE) (H): Primary | ICD-10-CM

## 2025-03-06 DIAGNOSIS — M70.61 TROCHANTERIC BURSITIS OF RIGHT HIP: ICD-10-CM

## 2025-03-06 ASSESSMENT — ENCOUNTER SYMPTOMS: HIP PAIN: 1

## 2025-03-06 NOTE — PROGRESS NOTES
Pt PT order was faxed to Johana Chaudhry for scheduling per pt [facility] [phone]  Mirza Phelps CMA

## 2025-03-06 NOTE — PROGRESS NOTES
"  Assessment & Plan     Moderate COPD (chronic obstructive pulmonary disease) (H)  Generally gets relief with albuterol.  Did not controller medications beneficial.  Episodic dyspnea.  - General PFT Lab (Please always keep checked); Future  - Pulmonary Function Test; Future    Trochanteric bursitis of right hip  Lateral hip pain for several months.  Exam shows tenderness over the area.  Commended OTC Voltaren gel.  - XR Hip Right 2-3 Views; Future  - Physical Therapy  Referral; Future    Patient return if not better with PT and Voltaren gel.    Rogelio Santiago is a 69 year old, presenting for the following health issues:  Hip Pain (Pt c/o right hip pain and stiffness x \"couple of months ago\")      3/6/2025     2:08 PM   Additional Questions   Roomed by Mirza GARCIA     Hip Pain    History of Present Illness       Reason for visit:  Hip pain     limping  Symptom onset:  More than a month  Symptoms include:  Limp    aching  Symptom intensity:  Moderate  Symptom progression:  Worsening  Had these symptoms before:  No  What makes it worse:  Alot of walking   She is taking medications regularly.      Patient complains of 2 to 3 months of right lateral hip pain and stiffness.  She noticed the pain initially getting in and out of the car.  She used to like to sleep on that side but can no longer do that due to discomfort.  No weakness.  No bowel or bladder complaints.  No fever or history of cancer.  No back pain.  Claudication or paresthesia.              Review of Systems  Constitutional, HEENT, cardiovascular, pulmonary, gi and gu systems are negative, except as otherwise noted.      Objective    /81 (BP Location: Right arm, Patient Position: Sitting, Cuff Size: Adult Large)   Pulse 64   Temp 97.3  F (36.3  C) (Tympanic)   Resp 20   Ht 1.619 m (5' 3.75\")   Wt 84.8 kg (187 lb)   LMP  (LMP Unknown)   SpO2 96%   BMI 32.35 kg/m    Body mass index is 32.35 kg/m .  Physical Exam   Overweight woman in no " distress  Lungs clear  Heart exam regular, no murmur, no edema  Abdomen nontender  Tenderness over the right trochanteric bursa, right hip nontender, pelvis and lumbar spine otherwise nontender  Strength in the legs normal  Alert and oriented  In good spirits          The longitudinal plan of care for the diagnosis(es)/condition(s) as documented were addressed during this visit. Due to the added complexity in care, I will continue to support Pamela in the subsequent management and with ongoing continuity of care.    Signed Electronically by: Mark Barrera MD

## 2025-07-08 ENCOUNTER — RESULTS FOLLOW-UP (OUTPATIENT)
Dept: FAMILY MEDICINE | Facility: CLINIC | Age: 70
End: 2025-07-08

## 2025-07-08 ENCOUNTER — OFFICE VISIT (OUTPATIENT)
Dept: FAMILY MEDICINE | Facility: CLINIC | Age: 70
End: 2025-07-08
Payer: COMMERCIAL

## 2025-07-08 VITALS
SYSTOLIC BLOOD PRESSURE: 110 MMHG | HEART RATE: 64 BPM | BODY MASS INDEX: 30.22 KG/M2 | DIASTOLIC BLOOD PRESSURE: 74 MMHG | TEMPERATURE: 97.6 F | RESPIRATION RATE: 16 BRPM | OXYGEN SATURATION: 97 % | HEIGHT: 64 IN | WEIGHT: 177 LBS

## 2025-07-08 DIAGNOSIS — I35.1 NONRHEUMATIC AORTIC VALVE INSUFFICIENCY: ICD-10-CM

## 2025-07-08 DIAGNOSIS — Z12.11 SCREEN FOR COLON CANCER: ICD-10-CM

## 2025-07-08 DIAGNOSIS — Z87.891 PERSONAL HISTORY OF TOBACCO USE: ICD-10-CM

## 2025-07-08 DIAGNOSIS — E78.2 MIXED HYPERLIPIDEMIA: ICD-10-CM

## 2025-07-08 DIAGNOSIS — F32.0 MILD MAJOR DEPRESSION: ICD-10-CM

## 2025-07-08 DIAGNOSIS — E66.09 CLASS 1 OBESITY DUE TO EXCESS CALORIES WITH SERIOUS COMORBIDITY AND BODY MASS INDEX (BMI) OF 30.0 TO 30.9 IN ADULT: ICD-10-CM

## 2025-07-08 DIAGNOSIS — I10 PRIMARY HYPERTENSION: ICD-10-CM

## 2025-07-08 DIAGNOSIS — R73.02 IMPAIRED GLUCOSE TOLERANCE: ICD-10-CM

## 2025-07-08 DIAGNOSIS — E66.811 CLASS 1 OBESITY DUE TO EXCESS CALORIES WITH SERIOUS COMORBIDITY AND BODY MASS INDEX (BMI) OF 30.0 TO 30.9 IN ADULT: ICD-10-CM

## 2025-07-08 DIAGNOSIS — Z00.00 ENCOUNTER FOR MEDICARE ANNUAL WELLNESS EXAM: Primary | ICD-10-CM

## 2025-07-08 DIAGNOSIS — J44.9 MODERATE COPD (CHRONIC OBSTRUCTIVE PULMONARY DISEASE) (H): ICD-10-CM

## 2025-07-08 LAB
ANION GAP SERPL CALCULATED.3IONS-SCNC: 10 MMOL/L (ref 7–15)
BUN SERPL-MCNC: 21.3 MG/DL (ref 8–23)
CALCIUM SERPL-MCNC: 9.6 MG/DL (ref 8.8–10.4)
CHLORIDE SERPL-SCNC: 101 MMOL/L (ref 98–107)
CHOLEST SERPL-MCNC: 148 MG/DL
CREAT SERPL-MCNC: 0.85 MG/DL (ref 0.51–0.95)
EGFRCR SERPLBLD CKD-EPI 2021: 72 ML/MIN/1.73M2
EST. AVERAGE GLUCOSE BLD GHB EST-MCNC: 131 MG/DL
FASTING STATUS PATIENT QL REPORTED: YES
FASTING STATUS PATIENT QL REPORTED: YES
GLUCOSE SERPL-MCNC: 114 MG/DL (ref 70–99)
HBA1C MFR BLD: 6.2 % (ref 0–5.6)
HCO3 SERPL-SCNC: 28 MMOL/L (ref 22–29)
HDLC SERPL-MCNC: 64 MG/DL
LDLC SERPL CALC-MCNC: 73 MG/DL
NONHDLC SERPL-MCNC: 84 MG/DL
POTASSIUM SERPL-SCNC: 4.1 MMOL/L (ref 3.4–5.3)
SODIUM SERPL-SCNC: 139 MMOL/L (ref 135–145)
TRIGL SERPL-MCNC: 54 MG/DL

## 2025-07-08 PROCEDURE — 80061 LIPID PANEL: CPT | Performed by: INTERNAL MEDICINE

## 2025-07-08 PROCEDURE — 99214 OFFICE O/P EST MOD 30 MIN: CPT | Mod: 25 | Performed by: INTERNAL MEDICINE

## 2025-07-08 PROCEDURE — 3078F DIAST BP <80 MM HG: CPT | Performed by: INTERNAL MEDICINE

## 2025-07-08 PROCEDURE — G0439 PPPS, SUBSEQ VISIT: HCPCS | Performed by: INTERNAL MEDICINE

## 2025-07-08 PROCEDURE — G0296 VISIT TO DETERM LDCT ELIG: HCPCS | Performed by: INTERNAL MEDICINE

## 2025-07-08 PROCEDURE — 36415 COLL VENOUS BLD VENIPUNCTURE: CPT | Performed by: INTERNAL MEDICINE

## 2025-07-08 PROCEDURE — 3074F SYST BP LT 130 MM HG: CPT | Performed by: INTERNAL MEDICINE

## 2025-07-08 PROCEDURE — 80048 BASIC METABOLIC PNL TOTAL CA: CPT | Performed by: INTERNAL MEDICINE

## 2025-07-08 PROCEDURE — 83036 HEMOGLOBIN GLYCOSYLATED A1C: CPT | Performed by: INTERNAL MEDICINE

## 2025-07-08 RX ORDER — SIMVASTATIN 40 MG
TABLET ORAL
Qty: 90 TABLET | Refills: 3 | Status: SHIPPED | OUTPATIENT
Start: 2025-07-08

## 2025-07-08 RX ORDER — TRIAMTERENE AND HYDROCHLOROTHIAZIDE 37.5; 25 MG/1; MG/1
TABLET ORAL
Qty: 90 TABLET | Refills: 3 | Status: SHIPPED | OUTPATIENT
Start: 2025-07-08

## 2025-07-08 RX ORDER — FLUOXETINE 10 MG/1
CAPSULE ORAL
Qty: 180 CAPSULE | Refills: 3 | Status: SHIPPED | OUTPATIENT
Start: 2025-07-08

## 2025-07-08 SDOH — HEALTH STABILITY: PHYSICAL HEALTH: ON AVERAGE, HOW MANY DAYS PER WEEK DO YOU ENGAGE IN MODERATE TO STRENUOUS EXERCISE (LIKE A BRISK WALK)?: 7 DAYS

## 2025-07-08 SDOH — HEALTH STABILITY: PHYSICAL HEALTH: ON AVERAGE, HOW MANY MINUTES DO YOU ENGAGE IN EXERCISE AT THIS LEVEL?: 30 MIN

## 2025-07-08 ASSESSMENT — PATIENT HEALTH QUESTIONNAIRE - PHQ9
SUM OF ALL RESPONSES TO PHQ QUESTIONS 1-9: 5
SUM OF ALL RESPONSES TO PHQ QUESTIONS 1-9: 5
10. IF YOU CHECKED OFF ANY PROBLEMS, HOW DIFFICULT HAVE THESE PROBLEMS MADE IT FOR YOU TO DO YOUR WORK, TAKE CARE OF THINGS AT HOME, OR GET ALONG WITH OTHER PEOPLE: NOT DIFFICULT AT ALL

## 2025-07-08 ASSESSMENT — SOCIAL DETERMINANTS OF HEALTH (SDOH): HOW OFTEN DO YOU GET TOGETHER WITH FRIENDS OR RELATIVES?: TWICE A WEEK

## 2025-07-08 NOTE — PROGRESS NOTES
Pt CT and Echo orders were faxed to the [facility] for scheduling per pt request [phone].  Mirza Phelps CMA

## 2025-07-08 NOTE — PROGRESS NOTES
Preventive Care Visit  [facility]  Mark Barrera MD, Internal Medicine  Jul 8, 2025      Assessment & Plan   Problem List Items Addressed This Visit       Moderate COPD (chronic obstructive pulmonary disease) (H)    2014 FEV1 FVC 55%, FEV1 1.32 L or 56% of predicted  Notes cough and dyspnea particularly in the mornings but declines additional therapy  Walks her dog 3 times a day  Uses albuterol on occasion but does not think it makes much difference         Mixed hyperlipidemia    Controlled with simvastatin 40 mg daily         Relevant Medications    simvastatin (ZOCOR) 40 MG tablet    Other Relevant Orders    Lipid panel reflex to direct LDL Fasting    Impaired glucose tolerance    Relevant Orders    Hemoglobin A1c    Mild major depression    Controlled with fluoxetine 10 mg daily         Relevant Medications    FLUoxetine (PROZAC) 10 MG capsule    Class 1 obesity due to excess calories with serious comorbidity and body mass index (BMI) of 30.0 to 30.9 in adult    Reviewed the importance of weight management and offered referral         Primary hypertension    Controlled with triamterene 37.5/hydrochlorothiazide 25         Relevant Medications    triamterene-HCTZ (MAXZIDE-25) 37.5-25 MG tablet    Other Relevant Orders    Basic metabolic panel  (Ca, Cl, CO2, Creat, Gluc, K, Na, BUN)    Nonrheumatic aortic valve insufficiency    PROSOLV - 11/08/2023 7:57 AM CST   Summary   1. Sinus bradycardia during study.   2. Normal LV size with normal wall thickness. Calculated 3D LVEF 56%. No  regional wall motion abnormalities. (Normal function). Grade 1 diastolic  dysfunction.   3. Normal RV size and systolic function. Estimated PASP 31 mmHg  + RA  Pressure.   4. Mild LA enlargement.   5. Sclerotic aortic valve, no stenosis.   6. Mild aortic valve regurgitation.   7. Mild mitral valve regurgitation.   8. There is mild tricuspid valve regurgitation.   9. A prior study is not available for  "comparison.         Relevant Orders    Echocardiogram Complete     Other Visit Diagnoses         Encounter for Medicare annual wellness exam    -  Primary      Screen for colon cancer        Declined after discussion of options      Personal history of tobacco use        Relevant Orders    Prof fee: Shared Decision Making for Lung Cancer Screening (Completed)    CT Chest Lung Cancer Scrn Low Dose wo                    BMI  Estimated body mass index is 30.78 kg/m  as calculated from the following:    Height as of this encounter: 1.615 m (5' 3.58\").    Weight as of this encounter: 80.3 kg (177 lb).   Weight management plan: Discussed healthy diet and exercise guidelines  Reviewed preventive health counseling, as reflected in patient instructions  Special attention given to:        Regular exercise       Colorectal Cancer Screening       Consider lung cancer screening for ages 55-80 years (77 for Medicare) and 20 pack-year smoking history   Counseling  Appropriate preventive services were addressed with this patient via screening, questionnaire, or discussion as appropriate for fall prevention, nutrition, physical activity, Tobacco-use cessation, social engagement, weight loss and cognition.  Checklist reviewing preventive services available has been given to the patient.  Reviewed patient's diet, addressing concerns and/or questions.   She is at risk for psychosocial distress and has been provided with information to reduce risk.   The patient was provided with written information regarding signs of hearing loss.       Follow-up    Follow-up Visit   Expected date:  Jul 15, 2026 (Approximate)      Follow Up Appointment Details:     Follow-up with whom?: PCP    Follow-Up for what?: Medicare Wellness    Welcome or Annual?: Annual Wellness    How?: In Person                 Rogelio Santiago is a 70 year old, presenting for the following:  Wellness Visit (Pt here for an AWV.), Hypertension, and Depression      HEARING " FREQUENCY    Right Ear:      1000 Hz RESPONSE- on Level: 40 db (Conditioning sound)   1000 Hz: RESPONSE- on Level: tone not heard   2000 Hz: RESPONSE- on Level: tone not heard   4000 Hz: RESPONSE- on Level: tone not heard    Left Ear:      4000 Hz: RESPONSE- on Level: tone not heard   2000 Hz: RESPONSE- on Level: 40 db   1000 Hz: RESPONSE- on Level: 40 db      Hearing Acuity: REFER    Hearing Assessment: abnormal--Pt declines any further eval at this time             2025    10:20 AM   Additional Questions   Roomed by Mirza GARCIA          HPI    Patient here for a wellness visit.  Preparing for a garage sale.  Walks her dog 3 times daily.  Rarely uses albuterol and does not feel like it helps much anyway.  Denies depressed mood or anhedonia.     Hypertension Follow-up    Do you check your blood pressure regularly outside of the clinic? No   Are you following a low salt diet? Yes  Are your blood pressures ever more than 140 on the top number (systolic) OR more   than 90 on the bottom number (diastolic), for example 140/90? No    BP Readings from Last 2 Encounters:   25 110/74   25 129/81     Depression   How are you doing with your depression since your last visit? Improved   Are you having other symptoms that might be associated with depression? No  Have you had a significant life event?  No   Are you feeling anxious or having panic attacks?   No  Do you have any concerns with your use of alcohol or other drugs? No    Social History     Tobacco Use    Smoking status: Former     Current packs/day: 0.00     Average packs/day: 1 pack/day for 40.0 years (40.0 ttl pk-yrs)     Types: Cigarettes     Start date:      Quit date:      Years since quittin.5    Smokeless tobacco: Never   Vaping Use    Vaping status: Never Used   Substance Use Topics    Alcohol use: Not Currently    Drug use: Never         2023    11:02 AM 2024     8:34 AM 2025    10:11 AM   PHQ   PHQ-9 Total Score 2 5 5     Q9: Thoughts of better off dead/self-harm past 2 weeks Not at all Not at all Not at all       Patient-reported          No data to display                  7/8/2025    10:11 AM   Last PHQ-9   1.  Little interest or pleasure in doing things 0   2.  Feeling down, depressed, or hopeless 0   3.  Trouble falling or staying asleep, or sleeping too much 2   4.  Feeling tired or having little energy 2   5.  Poor appetite or overeating 0   6.  Feeling bad about yourself 0   7.  Trouble concentrating 1   8.  Moving slowly or restless 0   Q9: Thoughts of better off dead/self-harm past 2 weeks 0   PHQ-9 Total Score 5        Patient-reported       Suicide Assessment Five-step Evaluation and Treatment (SAFE-T)      Advance Care Planning    Document on file is a Health Care Directive or POLST.        7/8/2025   General Health   How would you rate your overall physical health? Good   Feel stress (tense, anxious, or unable to sleep) To some extent   (!) STRESS CONCERN      7/8/2025   Nutrition   Diet: Regular (no restrictions)         7/8/2025   Exercise   Days per week of moderate/strenous exercise 7 days   Average minutes spent exercising at this level 30 min         7/8/2025   Social Factors   Frequency of gathering with friends or relatives Twice a week   Worry food won't last until get money to buy more No   Food not last or not have enough money for food? No   Do you have housing? (Housing is defined as stable permanent housing and does not include staying outside in a car, in a tent, in an abandoned building, in an overnight shelter, or couch-surfing.) Yes   Are you worried about losing your housing? No   Lack of transportation? No   Unable to get utilities (heat,electricity)? No         7/8/2025   Fall Risk   Fallen 2 or more times in the past year? No   Trouble with walking or balance? No          7/8/2025   Activities of Daily Living- Home Safety   Needs help with the following daily activites None of the above   Safety  concerns in the home None of the above         2025   Dental   Dentist two times every year? Yes         2025   Hearing Screening   Hearing concerns? (!) TROUBLE UNDERSTANDING SOFT OR WHISPERED SPEECH.   Would you like a referral for hearing testing? No         2025   Driving Risk Screening   Patient/family members have concerns about driving No         2025   General Alertness/Fatigue Screening   Have you been more tired than usual lately? No         2025   Urinary Incontinence Screening   Bothered by leaking urine in past 6 months No       Today's PHQ-9 Score:       2025    10:11 AM   PHQ-9 SCORE   PHQ-9 Total Score MyChart 5 (Mild depression)   PHQ-9 Total Score 5        Patient-reported         2025   Substance Use   Alcohol more than 3/day or more than 7/wk No   Do you have a current opioid prescription? No   How severe/bad is pain from 1 to 10? 1/10   Do you use any other substances recreationally? (!) DECLINE     Social History     Tobacco Use    Smoking status: Former     Current packs/day: 0.00     Average packs/day: 1 pack/day for 40.0 years (40.0 ttl pk-yrs)     Types: Cigarettes     Start date:      Quit date:      Years since quittin.5    Smokeless tobacco: Never   Vaping Use    Vaping status: Never Used   Substance Use Topics    Alcohol use: Not Currently    Drug use: Never              ASCVD Risk   The 10-year ASCVD risk score (Bma VILLALOBOS, et al., 2019) is: 10.7%    Values used to calculate the score:      Age: 70 years      Sex: Female      Is Non- : No      Diabetic: No      Tobacco smoker: No      Systolic Blood Pressure: 110 mmHg      Is BP treated: Yes      HDL Cholesterol: 60 mg/dL      Total Cholesterol: 136 mg/dL            Reviewed and updated as needed this visit by Provider                    Past Surgical History:   Procedure Laterality Date    APPENDECTOMY  1984    CHOLECYSTECTOMY  04/15/2018    ENDOSCOPIC  "RETROGRADE CHOLANGIOPANCREATOGRAPHY  04/16/2018    IR NEPHROLITHOTOMY  6/15/2022    IR RENAL ANGIOGRAM LEFT  6/17/2022    KIDNEY SURGERY      1972    PERCUTANEOUS NEPHROLITHOTOMY Left 6/15/2022    Procedure: NEPHROLITHOTOMY, PERCUTANEOUS AND STENT INSERTION WITH HOLIUM LASER LITHOTRIPSY;  Surgeon: Sergio Nowak MD;  [place] [facility] [place]     Current providers sharing in care for this patient include:  Patient Care Team:  Mark Barrera MD as PCP - General (Internal Medicine)  Mark Barrera MD as Assigned PCP    The following health maintenance items are reviewed in Epic and correct as of today:  Health Maintenance   Topic Date Due    DEPRESSION ACTION PLAN  Never done    COLORECTAL CANCER SCREENING  Never done    ANNUAL REVIEW OF HM ORDERS  09/30/2025    COVID-19 VACCINE (8 - 2024-25 season) 09/08/2025 (Originally 9/12/2024)    INFLUENZA VACCINE (1) 09/01/2025    MEDICARE ANNUAL WELLNESS VISIT  09/30/2025    BMP  09/30/2025    LIPID  09/30/2025    LUNG CANCER SCREENING  10/10/2025    PHQ-9  01/08/2026    FALL RISK ASSESSMENT  07/08/2026    MAMMO SCREENING  04/14/2027    DIABETES SCREENING  09/30/2027    DTAP/TDAP/TD VACCINE (3 - Td or Tdap) 10/01/2028    ADVANCE CARE PLANNING  09/30/2029    DEXA  07/17/2035    SPIROMETRY  Completed    HEPATITIS C SCREENING  Completed    COPD ACTION PLAN  Completed    PNEUMOCOCCAL VACCINE 50+ YEARS  Completed    ZOSTER VACCINE  Completed    RSV VACCINE  Completed    HPV VACCINE  Aged Out    MENINGITIS VACCINE  Aged Out         Review of Systems  Constitutional, HEENT, cardiovascular, pulmonary, GI, , musculoskeletal, neuro, skin, endocrine and psych systems are negative, except as otherwise noted.     Objective    Exam  /74 (BP Location: Right arm, Patient Position: Sitting, Cuff Size: Adult Large)   Pulse 64   Temp 97.6  F (36.4  C) (Tympanic)   Resp 16   Ht 1.615 m (5' 3.58\")   Wt 80.3 kg (177 lb)   LMP  (LMP Unknown)   SpO2 97%   BMI 30.78 kg/m   " "  Estimated body mass index is 30.78 kg/m  as calculated from the following:    Height as of this encounter: 1.615 m (5' 3.58\").    Weight as of this encounter: 80.3 kg (177 lb).    Physical Exam  Healthy-appearing and in no distress  Eyes appear normal  HEENT exam unremarkable. Mallampati 1.  Neck supple no adenopathy or thyromegaly  Lungs clear  Cardiac exam regular, no murmur, no edema, normal carotid and posterior tibial pulsations  Abdomen soft, nondistended, nontender  Alert, oriented, speech and cognition intact, cranial nerves normal, strength and gait normal  Normal mood and affect          7/8/2025   Mini Cog   Clock Draw Score 2 Normal   3 Item Recall 3 objects recalled   Mini Cog Total Score 5              Signed Electronically by: Mark Barrera MD    Answers submitted by the patient for this visit:  Patient Health Questionnaire (Submitted on 7/8/2025)  If you checked off any problems, how difficult have these problems made it for you to do your work, take care of things at home, or get along with other people?: Not difficult at all  PHQ9 TOTAL SCORE: 5    "

## 2025-07-08 NOTE — PROGRESS NOTES
Lung Cancer Screening Shared Decision Making Visit     Pamela Shelton, a 70 year old female, is eligible for lung cancer screening    History   Smoking Status     Former     Packs/day: 1.00     Years: 40.00     Types: Cigarettes     Quit date: 2013   Smokeless Tobacco     Never       I have discussed with patient the risks and benefits of screening for lung cancer with low-dose CT.     The risks include:    radiation exposure: one low dose chest CT has as much ionizing radiation as about 15 chest x-rays, or 6 months of background radiation living in [place]      false positives: most findings/nodules are NOT cancer, but some might still require additional diagnostic evaluation, including biopsy    over-diagnosis: some slow growing cancers that might never have been clinically significant will be detected and treated unnecessarily     The benefit of early detection of lung cancer is contingent upon adherence to annual screening or more frequent follow up if indicated.     Furthermore, to benefit from screening, Pamela must be willing and able to undergo diagnostic procedures, if indicated. Although no specific guide is available for determining severity of comorbidities, it is reasonable to withhold screening in patients who have greater mortality risk from other diseases.     We did discuss that the best way to prevent lung cancer is to not smoke.    Some patients may value a numeric estimation of lung cancer risk when evaluating if lung cancer screening is right for them, here is one calculator:    ShouldIScreen  Lung Cancer Screening Shared Decision Making Visit     Pamela Shelton, a 70 year old female, is eligible for lung cancer screening    History   Smoking Status     Former     Packs/day: 1.00     Years: 40.00     Types: Cigarettes     Quit date: 2013   Smokeless Tobacco     Never       I have discussed with patient the risks and benefits of screening for lung cancer with low-dose CT.     The risks  include:    radiation exposure: one low dose chest CT has as much ionizing radiation as about 15 chest x-rays, or 6 months of background radiation living in [place]      false positives: most findings/nodules are NOT cancer, but some might still require additional diagnostic evaluation, including biopsy    over-diagnosis: some slow growing cancers that might never have been clinically significant will be detected and treated unnecessarily     The benefit of early detection of lung cancer is contingent upon adherence to annual screening or more frequent follow up if indicated.     Furthermore, to benefit from screening, Pamela must be willing and able to undergo diagnostic procedures, if indicated. Although no specific guide is available for determining severity of comorbidities, it is reasonable to withhold screening in patients who have greater mortality risk from other diseases.     We did discuss that the best way to prevent lung cancer is to not smoke.    Some patients may value a numeric estimation of lung cancer risk when evaluating if lung cancer screening is right for them, here is one calculator:    ShouldIScreen    Pamela BEN Shelton has declined screening at this time

## 2025-07-08 NOTE — LETTER
July 8, 2025      Pamela Shelton  [address] [facility] [address]        Dear ,    We are writing to inform you of your test results.    You have prediabetes. Recommend weight loss, a low carbohydrate diet, and regular physical activity.     Resulted Orders   Basic metabolic panel  (Ca, Cl, CO2, Creat, Gluc, K, Na, BUN)   Result Value Ref Range    Sodium 139 135 - 145 mmol/L    Potassium 4.1 3.4 - 5.3 mmol/L    Chloride 101 98 - 107 mmol/L    Carbon Dioxide (CO2) 28 22 - 29 mmol/L    Anion Gap 10 7 - 15 mmol/L    Urea Nitrogen 21.3 8.0 - 23.0 mg/dL    Creatinine 0.85 0.51 - 0.95 mg/dL    GFR Estimate 72 >60 mL/min/1.73m2      Comment:      eGFR calculated using 2021 CKD-EPI equation.    Calcium 9.6 8.8 - 10.4 mg/dL    Glucose 114 (H) 70 - 99 mg/dL    Patient Fasting > 8hrs? Yes    Lipid panel reflex to direct LDL Fasting   Result Value Ref Range    Cholesterol 148 <200 mg/dL    Triglycerides 54 <150 mg/dL    Direct Measure HDL 64 >=50 mg/dL    LDL Cholesterol Calculated 73 <100 mg/dL      Comment:      LDL calculated using the Friedewald equation.    Non HDL Cholesterol 84 <130 mg/dL    Patient Fasting > 8hrs? Yes     Narrative    Cholesterol  Desirable: < 200 mg/dL  Borderline High: 200 - 239 mg/dL  High: >= 240 mg/dL    Triglycerides  Normal: < 150 mg/dL  Borderline High: 150 - 199 mg/dL  High: 200-499 mg/dL  Very High: >= 500 mg/dL    Direct Measure HDL  Female: >= 50 mg/dL   Male: >= 40 mg/dL    LDL Cholesterol  Desirable: < 100 mg/dL  Above Desirable: 100 - 129 mg/dL   Borderline High: 130 - 159 mg/dL   High:  160 - 189 mg/dL   Very High: >= 190 mg/dL    Non HDL Cholesterol  Desirable: < 130 mg/dL  Above Desirable: 130 - 159 mg/dL  Borderline High: 160 - 189 mg/dL  High: 190 - 219 mg/dL  Very High: >= 220 mg/dL   Hemoglobin A1c   Result Value Ref Range    Estimated Average Glucose 131 (H) <117 mg/dL    Hemoglobin A1C 6.2 (H) 0.0 - 5.6 %      Comment:      Normal <5.7%   Prediabetes 5.7-6.4%     Diabetes 6.5% or higher     Note: Adopted from ADA consensus guidelines.       If you have any questions or concerns, please call the clinic at the number listed above.       Sincerely,      Mark Barrera MD    Electronically signed

## 2025-07-08 NOTE — PATIENT INSTRUCTIONS
Patient Education   Preventive Care Advice   This is general advice given by our system to help you stay healthy. However, your care team may have specific advice just for you. Please talk to your care team about your preventive care needs.  Nutrition  Eat 5 or more servings of fruits and vegetables each day.  Try wheat bread, brown rice and whole grain pasta (instead of white bread, rice, and pasta).  Get enough calcium and vitamin D. Check the label on foods and aim for 100% of the RDA (recommended daily allowance).  Lifestyle  Exercise at least 150 minutes each week  (30 minutes a day, 5 days a week).  Do muscle strengthening activities 2 days a week. These help control your weight and prevent disease.  No smoking.  Wear sunscreen to prevent skin cancer.  Have a dental exam and cleaning every 6 months.  Yearly exams  See your health care team every year to talk about:  Any changes in your health.  Any medicines your care team has prescribed.  Preventive care, family planning, and ways to prevent chronic diseases.  Shots (vaccines)   HPV shots (up to age 26), if you've never had them before.  Hepatitis B shots (up to age 59), if you've never had them before.  COVID-19 shot: Get this shot when it's due.  Flu shot: Get a flu shot every year.  Tetanus shot: Get a tetanus shot every 10 years.  Pneumococcal, hepatitis A, and RSV shots: Ask your care team if you need these based on your risk.  Shingles shot (for age 50 and up)  General health tests  Diabetes screening:  Starting at age 35, Get screened for diabetes at least every 3 years.  If you are younger than age 35, ask your care team if you should be screened for diabetes.  Cholesterol test: At age 39, start having a cholesterol test every 5 years, or more often if advised.  Bone density scan (DEXA): At age 50, ask your care team if you should have this scan for osteoporosis (brittle bones).  Hepatitis C: Get tested at least once in your life.  STIs (sexually  transmitted infections)  Before age 24: Ask your care team if you should be screened for STIs.  After age 24: Get screened for STIs if you're at risk. You are at risk for STIs (including HIV) if:  You are sexually active with more than one person.  You don't use condoms every time.  You or a partner was diagnosed with a sexually transmitted infection.  If you are at risk for HIV, ask about PrEP medicine to prevent HIV.  Get tested for HIV at least once in your life, whether you are at risk for HIV or not.  Cancer screening tests  Cervical cancer screening: If you have a cervix, begin getting regular cervical cancer screening tests starting at age 21.  Breast cancer scan (mammogram): If you've ever had breasts, begin having regular mammograms starting at age 40. This is a scan to check for breast cancer.  Colon cancer screening: It is important to start screening for colon cancer at age 45.  Have a colonoscopy test every 10 years (or more often if you're at risk) Or, ask your provider about stool tests like a FIT test every year or Cologuard test every 3 years.  To learn more about your testing options, visit:   .  For help making a decision, visit:   https://bit.ly/nu07946.  Prostate cancer screening test: If you have a prostate, ask your care team if a prostate cancer screening test (PSA) at age 55 is right for you.  Lung cancer screening: If you are a current or former smoker ages 50 to 80, ask your care team if ongoing lung cancer screenings are right for you.  For informational purposes only. Not to replace the advice of your health care provider. Copyright   2023 [employer]. All rights reserved. Clinically reviewed by the [facility] Transitions Program. [employer] [id number] - REV 01/24.  Hearing Loss: Care Instructions  Overview     Hearing loss is a sudden or slow decrease in how well you hear. It can range from slight to profound. Permanent hearing loss can occur with aging. It also can  happen when you are exposed long-term to loud noise. Examples include listening to loud music, riding motorcycles, or being around other loud machines.  Hearing loss can affect your work and home life. It can make you feel lonely or depressed. You may feel that you have lost your independence. But hearing aids and other devices can help you hear better and feel connected to others.  Follow-up care is a key part of your treatment and safety. Be sure to make and go to all appointments, and call your doctor if you are having problems. It's also a good idea to know your test results and keep a list of the medicines you take.  How can you care for yourself at home?  Avoid loud noises whenever possible. This helps keep your hearing from getting worse.  Always wear hearing protection around loud noises.  Wear a hearing aid as directed.  A professional can help you pick a hearing aid that will work best for you.  You can also get hearing aids over the counter for mild to moderate hearing loss.  Have hearing tests as your doctor suggests. They can show whether your hearing has changed. Your hearing aid may need to be adjusted.  Use other devices as needed. These may include:  Telephone amplifiers and hearing aids that can connect to a television, stereo, radio, or microphone.  Devices that use lights or vibrations. These alert you to the doorbell, a ringing telephone, or a baby monitor.  Television closed-captioning. This shows the words at the bottom of the screen. Most new TVs can do this.  TTY (text telephone). This lets you type messages back and forth on the telephone instead of talking or listening. These devices are also called TDD. When messages are typed on the keyboard, they are sent over the phone line to a receiving TTY. The message is shown on a monitor.  Use text messaging, social media, and email if it is hard for you to communicate by telephone.  Try to learn a listening technique called speechreading. It is  "not lipreading. You pay attention to people's gestures, expressions, posture, and tone of voice. These clues can help you understand what a person is saying. Face the person you are talking to, and have them face you. Make sure the lighting is good. You need to see the other person's face clearly.  Think about counseling if you need help to adjust to your hearing loss.  When should you call for help?  Watch closely for changes in your health, and be sure to contact your doctor if:    You think your hearing is getting worse.     You have new symptoms, such as dizziness or nausea.   Where can you learn more?  Go to https://www.[employer]/patiented  Enter R798 in the search box to learn more about \"Hearing Loss: Care Instructions.\"  Current as of: October 27, 2024  Content Version: 14.5    [id number] [employer].   Care instructions adapted under license by your healthcare professional. If you have questions about a medical condition or this instruction, always ask your healthcare professional. [employer] disclaims any warranty or liability for your use of this information.    Learning About Stress  What is stress?     Stress is your body's response to a hard situation. Your body can have a physical, emotional, or mental response. Stress is a fact of life for most people, and it affects everyone differently. What causes stress for you may not be stressful for someone else.  A lot of things can cause stress. You may feel stress when you go on a job interview, take a test, or run a race. This kind of short-term stress is normal and even useful. It can help you if you need to work hard or react quickly. For example, stress can help you finish an important job on time.  Long-term stress is caused by ongoing stressful situations or events. Examples of long-term stress include long-term health problems, ongoing problems at work, or conflicts in your family. Long-term stress can harm your " health.  How does stress affect your health?  When you are stressed, your body responds as though you are in danger. It makes hormones that speed up your heart, make you breathe faster, and give you a burst of energy. This is called the fight-or-flight stress response. If the stress is over quickly, your body goes back to normal and no harm is done.  But if stress happens too often or lasts too long, it can have bad effects. Long-term stress can make you more likely to get sick, and it can make symptoms of some diseases worse. If you tense up when you are stressed, you may develop neck, shoulder, or low back pain. Stress is linked to high blood pressure and heart disease.  Stress also harms your emotional health. It can make you encarnacion, tense, or depressed. Your relationships may suffer, and you may not do well at work or school.  What can you do to manage stress?  You can try these things to help manage stress:   Do something active. Exercise or activity can help reduce stress. Walking is a great way to get started. Even everyday activities such as housecleaning or yard work can help.  Try yoga or zenon chi. These techniques combine exercise and meditation. You may need some training at first to learn them.  Do something you enjoy. For example, listen to music or go to a movie. Practice your hobby or do volunteer work.  Meditate. This can help you relax, because you are not worrying about what happened before or what may happen in the future.  Do guided imagery. Imagine yourself in any setting that helps you feel calm. You can use online videos, books, or a teacher to guide you.  Do breathing exercises. For example:  From a standing position, bend forward from the waist with your knees slightly bent. Let your arms dangle close to the floor.  Breathe in slowly and deeply as you return to a standing position. Roll up slowly and lift your head last.  Hold your breath for just a few seconds in the standing  "position.  Breathe out slowly and bend forward from the waist.  Let your feelings out. Talk, laugh, cry, and express anger when you need to. Talking with supportive friends or family, a counselor, or a geraldine leader about your feelings is a healthy way to relieve stress. Avoid discussing your feelings with people who make you feel worse.  Write. It may help to write about things that are bothering you. This helps you find out how much stress you feel and what is causing it. When you know this, you can find better ways to cope.  What can you do to prevent stress?  You might try some of these things to help prevent stress:  Manage your time. This helps you find time to do the things you want and need to do.  Get enough sleep. Your body recovers from the stresses of the day while you are sleeping.  Get support. Your family, friends, and community can make a difference in how you experience stress.  Limit your news feed. Avoid or limit time on social media or news that may make you feel stressed.  Do something active. Exercise or activity can help reduce stress. Walking is a great way to get started.  Where can you learn more?  Go to https://www.[employer].net/patiented  Enter N032 in the search box to learn more about \"Learning About Stress.\"  Current as of: October 24, 2024  Content Version: 14.5 2024-2025 [employer].   Care instructions adapted under license by your healthcare professional. If you have questions about a medical condition or this instruction, always ask your healthcare professional. [employer] disclaims any warranty or liability for your use of this information.    Recovering From Depression: Care Instructions  Overview    Sticking to your treatment plan is important as you recover from depression. It may take time for your symptoms to get better after you start treatment. Try not to give up if you don't feel better right away. Make sure you keep going to counseling and " taking any prescribed medicine if they are part of your treatment plan.  Focus on things that can help you feel better, such as being with friends and family. Try to eat healthy foods, be active, and get enough sleep. Take things slowly as you begin to recover.  Follow-up care is a key part of your treatment and safety. Be sure to make and go to all appointments, and call your doctor if you are having problems. It's also a good idea to know your test results and keep a list of the medicines you take.  How can you care for yourself at home?  Be realistic  If you have a large task to do, break it up into smaller steps you can handle, and just do what you can.  You may want to put off important decisions until your depression has lifted. If you have plans that will have a major impact on your life, such as marriage, divorce, or a job change, try to wait a bit. Talk it over with friends and loved ones who can help you look at the overall picture first.  Reaching out to people for help is important. Do not isolate yourself. Let your family and friends help you. Find someone you can trust and confide in, and talk to that person.  Be patient, and be kind to yourself. Remember that depression is not your fault and is not something you can overcome with willpower alone. Treatment is important for depression, just like for any other illness. Feeling better takes time, and your mood will improve little by little.  Stay active  Stay busy and get outside. Take a walk, or try some other light exercise.  Talk with your doctor about an exercise program. Exercise can help with mild depression.  Go to a movie or concert. Take part in a [employer] activity or other social gathering. Go to a ball game.  Ask a friend to have dinner with you.  Take care of yourself  Eat healthy foods such as fresh fruits and vegetables, whole grains, and lean protein. If you have lost your appetite, eat small snacks rather than large meals.  Avoid using  marijuana and other drugs and drinking alcohol. Do not take medicines that have not been prescribed for you. They may interfere with medicines you may be taking for depression, or they may make your depression worse.  Take your medicines exactly as they are prescribed. You may start to feel better within 1 to 3 weeks of taking antidepressant medicine. But it can take as many as 6 to 8 weeks to see more improvement. If you have questions or concerns about your medicines, or if you do not notice any improvement by 3 weeks, talk to your doctor.  Continue to take your medicine after your symptoms improve. Taking your medicine for at least 6 months after you feel better can help keep you from getting depressed again. If this isn't the first time you have been depressed, your doctor may recommend you to take medicine even longer.  If you have any side effects from your medicine, tell your doctor. Many side effects are mild and will go away on their own after you have been taking the medicine for a few weeks. Some may last longer. Talk to your doctor if side effects are bothering you too much. You might be able to try a different medicine.  Continue counseling. It may help prevent depression from returning, especially if you've had multiple episodes of depression. Talk with your counselor if you are having a hard time attending your sessions or you think the sessions aren't working. Don't just stop going.  Get enough sleep. Talk to your doctor if you are having problems sleeping.  Avoid sleeping pills unless they are prescribed by the doctor treating your depression. Sleeping pills may make you groggy during the day, and they may interact with other medicine you are taking.  If you have any other illnesses, such as diabetes, heart disease, or high blood pressure, make sure to continue with your treatment. Tell your doctor about all of the medicines you take, including those with or without a prescription.  Where to get  "help 24 hours a day, 7 days a week  If you or someone you know talks about suicide, self-harm, a mental health crisis, a substance use crisis, or any other kind of emotional distress, get help right away. You can:  Call the Suicide and Crisis Lifeline at 988.  Text HOME to 745771 to access the Crisis Text Line.  Consider saving these numbers in your phone.  Go to Department of Health and Human Services for more information or to chat online.  Call 911 anytime you think you may need emergency care. For example, call if:  You feel like hurting yourself or someone else.  Someone you know has depression and is about to attempt or is attempting suicide.  Where to get help 24 hours a day, 7 days a week  If you or someone you know talks about suicide, self-harm, a mental health crisis, a substance use crisis, or any other kind of emotional distress, get help right away. You can:  Call the Suicide and Crisis Lifeline at 988.  Text HOME to 319278 to access the Crisis Text Line.  Consider saving these numbers in your phone.  Go to Department of Health and Human Services for more information or to chat online.  Call your doctor now or seek immediate medical care if:  You hear voices.  Someone you know has depression and:  Starts to give away possessions.  Uses illegal drugs or drinks alcohol heavily.  Talks or writes about death, including writing suicide notes or talking about guns, knives, or pills.  Starts to spend a lot of time alone.  Acts very aggressively or suddenly appears calm.  Watch closely for changes in your health, and be sure to contact your doctor if:  You do not get better as expected.  Where can you learn more?  Go to https://www.[employer].net/patiented  Enter N529 in the search box to learn more about \"Recovering From Depression: Care Instructions.\"  Current as of: July 31, 2024  Content Version: 14.5 2024-2025 [employer].   Care instructions adapted under license by your healthcare professional. If you have questions about a medical " condition or this instruction, always ask your healthcare professional. [employer] disclaims any warranty or liability for your use of this information.       Lung Cancer Screening   Frequently Asked Questions  If you are at high-risk for lung cancer, getting screened with low-dose computed tomography (LDCT) every year can help save your life. This handout offers answers to some of the most common questions about lung cancer screening. If you have other questions, please call [phone] ([phone]).     What is it?  Lung cancer screening uses special X-ray technology to create an image of your lung tissue. The exam is quick and easy and takes less than 10 seconds. We don t give you any medicine or use any needles. You can eat before and after the exam. You don t need to change your clothes as long as the clothing on your chest doesn t contain metal. But, you do need to be able to hold your breath for at least 6 seconds during the exam.    What is the goal of lung cancer screening?  The goal of lung cancer screening is to save lives. Many times, lung cancer is not found until a person starts having physical symptoms. Lung cancer screening can help detect lung cancer in the earliest stages when it may be easier to treat.    Who should be screened for lung cancer?  We suggest lung cancer screening for anyone who is at high-risk for lung cancer. You are in the high-risk group if you:      are between the ages of 55 and 79, and    have smoked at least 1 pack of cigarettes a day for 20 or more years, and    still smoke or have quit within the past 15 years.    However, if you have a new cough or shortness of breath, you should talk to your doctor before being screened.    Why does it matter if I have symptoms?  Certain symptoms can be a sign that you have a condition in your lungs that should be checked and treated by your doctor. These symptoms include fever, chest pain, a new or changing cough,  shortness of breath that you have never felt before, coughing up blood or unexplained weight loss. Having any of these symptoms can greatly affect the results of lung cancer screening.       Should all smokers get an LDCT lung cancer screening exam?  It depends. Lung cancer screening is for a very specific group of men and women who have a history of heavy smoking over a long period of time (see  Who should be screened for lung cancer  above).  I am in the high-risk group, but have been diagnosed with cancer in the past. Is LDCT lung cancer screening right for me?  In some cases, you should not have LDCT lung screening, such as when your doctor is already following your cancer with CT scan studies. Your doctor will help you decide if LDCT lung screening is right for you.  Do I need to have a screening exam every year?  Yes. If you are in the high-risk group described earlier, you should get an LDCT lung cancer screening exam every year until you are 79, or are no longer willing or able to undergo screening and possible procedures to diagnose and treat lung cancer.  How effective is LDCT at preventing death from lung cancer?  Studies have shown that LDCT lung cancer screening can lower the risk of death from lung cancer by 20 percent in people who are at high-risk.  What are the risks?  There are some risks and limitations of LDCT lung cancer screening. We want to make sure you understand the risks and benefits, so please let us know if you have any questions. Your doctor may want to talk with you more about these risks.    Radiation exposure: As with any exam that uses radiation, there is a very small increased risk of cancer. The amount of radiation in LDCT is small--about the same amount a person would get from a mammogram. Your doctor orders the exam when he or she feels the potential benefits outweigh the risks.    False negatives: No test is perfect, including LDCT. It is possible that you may have a medical  condition, including lung cancer, that is not found during your exam. This is called a false negative result.    False positives and more testing: LDCT very often finds something in the lung that could be cancer, but in fact is not. This is called a false positive result. False positive tests often cause anxiety. To make sure these findings are not cancer, you may need to have more tests. These tests will be done only if you give us permission. Sometimes patients need a treatment that can have side effects, such as a biopsy. For more information on false positives, see  What can I expect from the results?     Findings not related to lung cancer: Your LDCT exam also takes pictures of areas of your body next to your lungs. In a very small number of cases, the CT scan will show an abnormal finding in one of these areas, such as your kidneys, adrenal glands, liver or thyroid. This finding may not be serious, but you may need more tests. Your doctor can help you decide what other tests you may need, if any.  What can I expect from the results?  About 1 out of 4 LDCT exams will find something that may need more tests. Most of the time, these findings are lung nodules. Lung nodules are very small collections of tissue in the lung. These nodules are very common, and the vast majority--more than 97 percent--are not cancer (benign). Most are normal lymph nodes or small areas of scarring from past infections.  But, if a small lung nodule is found to be cancer, the cancer can be cured more than 90 percent of the time. To know if the nodule is cancer, we may need to get more images before your next yearly screening exam. If the nodule has suspicious features (for example, it is large, has an odd shape or grows over time), we will refer you to a specialist for further testing.  Will my doctor also get the results?  Yes. Your doctor will get a copy of your results.  Is it okay to keep smoking now that there s a cancer screening  exam?  No. Tobacco is one of the strongest cancer-causing agents. It causes not only lung cancer, but other cancers and cardiovascular (heart) diseases as well. The damage caused by smoking builds over time. This means that the longer you smoke, the higher your risk of disease. While it is never too late to quit, the sooner you quit, the better.  Where can I find help to quit smoking?  The best way to prevent lung cancer is to stop smoking. If you have already quit smoking, congratulations and keep it up! For help on quitting smoking, please call [facility] at 6-462-QUIT-NOW ([phone]) or the [employer] at 1-228.143.3444 to find local resources near you.  One-on-one health coaching:  If you d prefer to work individually with a health care provider on tobacco cessation, we offer:      Medication Therapy Management:  Our specially trained pharmacists work closely with you and your doctor to help you quit smoking.  Call [phone] or [phone] (toll free).

## 2025-07-08 NOTE — ASSESSMENT & PLAN NOTE
2014 FEV1 FVC 55%, FEV1 1.32 L or 56% of predicted  Notes cough and dyspnea particularly in the mornings but declines additional therapy  Walks her dog 3 times a day  Uses albuterol on occasion but does not think it makes much difference

## (undated) DEVICE — DRAPE C-ARM 60X42" 1013

## (undated) DEVICE — GLOVE SURG PI ULTRA TOUCH M SZ 8 LF

## (undated) DEVICE — SNARE AMPLATZ GOOSENECK 4FRX10MM 120CM CATH KIT GN1000

## (undated) DEVICE — TUBING SUCTION MEDI-VAC 1/4"X20' N620A - HE

## (undated) DEVICE — GUIDEWIRE BENTSON FLEX TIP 0.035"X150CM M0066201250

## (undated) DEVICE — GOWN XLG DISP 9545

## (undated) DEVICE — DRAPE SHEET REV FOLD 3/4 9349

## (undated) DEVICE — GUIDEWIRE TERUMO .035X180 ANG GR3508

## (undated) DEVICE — SOL WATER IRRIG 1000ML BOTTLE 2F7114

## (undated) DEVICE — SUCTION MANIFOLD NEPTUNE 2 SYS 1 PORT 702-025-000

## (undated) DEVICE — LASER FIBER FLEXIVA PULSE 242 M006L8405910

## (undated) DEVICE — DRSG STERI STRIP 1/2X4" R1547

## (undated) DEVICE — WIRE GUIDE 0.035"X145CM AMPLATZ SUPER STIFF STR M001465230

## (undated) DEVICE — PREP CHLORAPREP 26ML TINTED HI-LITE ORANGE 930815

## (undated) DEVICE — SOL NACL 0.9% INJ 500ML BAG 2B1323Q

## (undated) DEVICE — DRSG KERLIX FLUFFS X5

## (undated) DEVICE — DRAPE CRANIOTOMY W/POUCH 9450

## (undated) DEVICE — WIRE GLIDE 0.035"X150CM 3CM ANG STIFF UWS6035

## (undated) DEVICE — DILATOR VASCULAR 10FRX20CM G00993

## (undated) DEVICE — Device

## (undated) DEVICE — TUBING IV EXTENSION SET 32"  2C5645

## (undated) DEVICE — TUBING IRRIG TUR Y TYPE 96" LF 6543-01

## (undated) DEVICE — MAT FLOOR SURGICAL 40X38 0702140238

## (undated) DEVICE — GLOVE BIOGEL PI ORTHOPRO SZ 7.5 47675

## (undated) DEVICE — POSITIONER ARM BOARD FOAM 7.5X20X2"

## (undated) DEVICE — POSITIONER HEAD ADULT FP-HEADCR

## (undated) DEVICE — NDL HOLDER W/SILICONE INSERTX2 AMPLATZ 090000-D

## (undated) DEVICE — CATH BALLOON WEDGE 6FR 60CM AI-07125

## (undated) DEVICE — GLIDEWIRE TERUMO .035X180 ANG STIFF GS3508

## (undated) DEVICE — DRAPE SHEET TABLE COVER KC 42301*

## (undated) DEVICE — GLOVE UNDER INDICATOR PI SZ 7.0 LF 41670

## (undated) DEVICE — CATH ANGIO TORCON BECAON TIP JB-1 5FRX65CM G11208

## (undated) DEVICE — SOL NACL 0.9% IRRIG 1000ML BOTTLE 2F7124

## (undated) DEVICE — TUBING SET THERMEDX UROLOGY SGL USE LL0006

## (undated) DEVICE — KIT ENDO FIRST STEP DISINFECTANT 200ML W/POUCH EP-4

## (undated) DEVICE — SOLUTION IRRIG 2B7127 .9NS 3000ML BAG

## (undated) DEVICE — BASKET NITINOL TIPLESS HALO  1.5FRX120CM 554120

## (undated) DEVICE — GLOVE UNDER INDICATOR PI SZ 6.5 LF 41665

## (undated) DEVICE — PREP POVIDONE IODINE SOLUTION 10% 4OZ BOTTLE 29906-004

## (undated) DEVICE — DILATOR VASCULAR 8FRX20CM G00980

## (undated) DEVICE — ADAPTOR CHECK FLO 050805-TWSL

## (undated) DEVICE — GUIDEWIRE SENSOR DUAL FLEX STR 0.035"X150CM M0066703080

## (undated) DEVICE — RAD NDL TROCAR 18GAX15CM G00945

## (undated) DEVICE — PACK MINOR SINGLE BASIN SSK3001

## (undated) DEVICE — CATH TRAY FOLEY SURESTEP 16FR DRAIN BAG STATOCK A899916

## (undated) DEVICE — DEVICE MULTI TORQUE TD01

## (undated) DEVICE — GLOVE BIOGEL PI ULTRATOUCH G SZ 7.0 42170

## (undated) DEVICE — DECANTER BAG 2002S

## (undated) DEVICE — CATH BALLOON NEPHROMAX 30FRX12CM M0062101180

## (undated) DEVICE — CUSTOM PACK DRAINAGE TRAY SER5BDRHEB

## (undated) DEVICE — SYR 20ML LL W/O NDL 302830

## (undated) DEVICE — TRAY PREP DRY SKIN SCRUB 067

## (undated) RX ORDER — FENTANYL CITRATE 50 UG/ML
INJECTION, SOLUTION INTRAMUSCULAR; INTRAVENOUS
Status: DISPENSED
Start: 2022-06-15

## (undated) RX ORDER — FENTANYL CITRATE 50 UG/ML
INJECTION, SOLUTION INTRAMUSCULAR; INTRAVENOUS
Status: DISPENSED
Start: 2022-06-17

## (undated) RX ORDER — ONDANSETRON 2 MG/ML
INJECTION INTRAMUSCULAR; INTRAVENOUS
Status: DISPENSED
Start: 2022-06-15

## (undated) RX ORDER — PROPOFOL 10 MG/ML
INJECTION, EMULSION INTRAVENOUS
Status: DISPENSED
Start: 2022-06-15

## (undated) RX ORDER — FENTANYL CITRATE-0.9 % NACL/PF 10 MCG/ML
PLASTIC BAG, INJECTION (ML) INTRAVENOUS
Status: DISPENSED
Start: 2022-06-15

## (undated) RX ORDER — FUROSEMIDE 10 MG/ML
INJECTION INTRAMUSCULAR; INTRAVENOUS
Status: DISPENSED
Start: 2022-05-19

## (undated) RX ORDER — DEXAMETHASONE SODIUM PHOSPHATE 10 MG/ML
INJECTION INTRAMUSCULAR; INTRAVENOUS
Status: DISPENSED
Start: 2022-06-15

## (undated) RX ORDER — LIDOCAINE HYDROCHLORIDE 10 MG/ML
INJECTION, SOLUTION EPIDURAL; INFILTRATION; INTRACAUDAL; PERINEURAL
Status: DISPENSED
Start: 2022-06-17

## (undated) RX ORDER — EPHEDRINE SULFATE 50 MG/ML
INJECTION, SOLUTION INTRAMUSCULAR; INTRAVENOUS; SUBCUTANEOUS
Status: DISPENSED
Start: 2022-06-15